# Patient Record
Sex: MALE | Race: WHITE | Employment: FULL TIME | URBAN - METROPOLITAN AREA
[De-identification: names, ages, dates, MRNs, and addresses within clinical notes are randomized per-mention and may not be internally consistent; named-entity substitution may affect disease eponyms.]

---

## 2019-10-23 PROBLEM — T84.59XA INFECTED PROSTHETIC KNEE JOINT, INITIAL ENCOUNTER (HCC): Status: ACTIVE | Noted: 2019-08-11

## 2019-10-23 PROBLEM — Z96.659 INFECTED PROSTHETIC KNEE JOINT, INITIAL ENCOUNTER (HCC): Status: ACTIVE | Noted: 2019-08-11

## 2019-11-27 PROBLEM — N13.8 BPH WITH OBSTRUCTION/LOWER URINARY TRACT SYMPTOMS: Status: ACTIVE | Noted: 2019-11-27

## 2019-11-27 PROBLEM — N40.1 BPH WITH OBSTRUCTION/LOWER URINARY TRACT SYMPTOMS: Status: ACTIVE | Noted: 2019-11-27

## 2019-11-27 PROBLEM — T84.59XD INFECTED PROSTHETIC KNEE JOINT, SUBSEQUENT ENCOUNTER: Status: ACTIVE | Noted: 2019-11-27

## 2019-11-27 PROBLEM — Z96.659 INFECTED PROSTHETIC KNEE JOINT, SUBSEQUENT ENCOUNTER: Status: ACTIVE | Noted: 2019-11-27

## 2020-04-07 ENCOUNTER — CLINICAL DOCUMENTATION (OUTPATIENT)
Dept: INFECTIOUS DISEASES | Age: 75
End: 2020-04-07

## 2020-06-12 ENCOUNTER — HOSPITAL ENCOUNTER (OUTPATIENT)
Age: 75
Discharge: HOME | End: 2020-06-12
Payer: MEDICARE

## 2020-06-12 LAB
ALBUMIN SERPL-MCNC: 4.2 G/DL (ref 3.5–5.2)
ALP BLD-CCNC: 51 U/L (ref 40–129)
ALT SERPL-CCNC: 11 U/L (ref 0–40)
ANION GAP SERPL CALCULATED.3IONS-SCNC: 12 MMOL/L (ref 7–16)
AST SERPL-CCNC: 18 U/L (ref 0–39)
BACTERIA: ABNORMAL /HPF
BASOPHILS ABSOLUTE: 0.02 E9/L (ref 0–0.2)
BASOPHILS RELATIVE PERCENT: 0.4 % (ref 0–2)
BILIRUB SERPL-MCNC: 0.5 MG/DL (ref 0–1.2)
BILIRUBIN URINE: NEGATIVE
BLOOD, URINE: ABNORMAL
BUN BLDV-MCNC: 37 MG/DL (ref 8–23)
CALCIUM SERPL-MCNC: 9.5 MG/DL (ref 8.6–10.2)
CHLORIDE BLD-SCNC: 110 MMOL/L (ref 98–107)
CLARITY: ABNORMAL
CO2: 20 MMOL/L (ref 22–29)
COLOR: YELLOW
CREAT SERPL-MCNC: 1.5 MG/DL (ref 0.7–1.2)
EOSINOPHILS ABSOLUTE: 0.12 E9/L (ref 0.05–0.5)
EOSINOPHILS RELATIVE PERCENT: 2.4 % (ref 0–6)
EPITHELIAL CELLS, UA: ABNORMAL /HPF
GFR AFRICAN AMERICAN: 55
GFR NON-AFRICAN AMERICAN: 46 ML/MIN/1.73
GLUCOSE BLD-MCNC: 97 MG/DL (ref 74–99)
GLUCOSE URINE: NEGATIVE MG/DL
HCT VFR BLD CALC: 37.6 % (ref 37–54)
HEMOGLOBIN: 11.8 G/DL (ref 12.5–16.5)
IMMATURE GRANULOCYTES #: 0.02 E9/L
IMMATURE GRANULOCYTES %: 0.4 % (ref 0–5)
KETONES, URINE: NEGATIVE MG/DL
LEUKOCYTE ESTERASE, URINE: ABNORMAL
LYMPHOCYTES ABSOLUTE: 1.08 E9/L (ref 1.5–4)
LYMPHOCYTES RELATIVE PERCENT: 21.6 % (ref 20–42)
MCH RBC QN AUTO: 26.9 PG (ref 26–35)
MCHC RBC AUTO-ENTMCNC: 31.4 % (ref 32–34.5)
MCV RBC AUTO: 85.6 FL (ref 80–99.9)
MONOCYTES ABSOLUTE: 0.49 E9/L (ref 0.1–0.95)
MONOCYTES RELATIVE PERCENT: 9.8 % (ref 2–12)
NEUTROPHILS ABSOLUTE: 3.26 E9/L (ref 1.8–7.3)
NEUTROPHILS RELATIVE PERCENT: 65.4 % (ref 43–80)
NITRITE, URINE: NEGATIVE
PDW BLD-RTO: 16.5 FL (ref 11.5–15)
PH UA: 5.5 (ref 5–9)
PLATELET # BLD: 214 E9/L (ref 130–450)
PMV BLD AUTO: 11.2 FL (ref 7–12)
POTASSIUM SERPL-SCNC: 4.7 MMOL/L (ref 3.5–5)
PROTEIN UA: NEGATIVE MG/DL
RBC # BLD: 4.39 E12/L (ref 3.8–5.8)
RBC UA: >20 /HPF (ref 0–2)
SODIUM BLD-SCNC: 142 MMOL/L (ref 132–146)
SPECIFIC GRAVITY UA: 1.02 (ref 1–1.03)
TOTAL PROTEIN: 7.4 G/DL (ref 6.4–8.3)
UROBILINOGEN, URINE: 0.2 E.U./DL
WBC # BLD: 5 E9/L (ref 4.5–11.5)
WBC UA: ABNORMAL /HPF (ref 0–5)

## 2020-06-12 PROCEDURE — 87186 SC STD MICRODIL/AGAR DIL: CPT

## 2020-06-12 PROCEDURE — 80053 COMPREHEN METABOLIC PANEL: CPT

## 2020-06-12 PROCEDURE — 87077 CULTURE AEROBIC IDENTIFY: CPT

## 2020-06-12 PROCEDURE — 36415 COLL VENOUS BLD VENIPUNCTURE: CPT

## 2020-06-12 PROCEDURE — 87088 URINE BACTERIA CULTURE: CPT

## 2020-06-12 PROCEDURE — 81001 URINALYSIS AUTO W/SCOPE: CPT

## 2020-06-12 PROCEDURE — 85025 COMPLETE CBC W/AUTO DIFF WBC: CPT

## 2020-06-14 LAB
ORGANISM: ABNORMAL
URINE CULTURE, ROUTINE: ABNORMAL

## 2020-06-15 ENCOUNTER — HOSPITAL ENCOUNTER (OUTPATIENT)
Dept: ULTRASOUND IMAGING | Age: 75
Discharge: HOME | End: 2020-06-15
Payer: MEDICARE

## 2020-06-15 LAB
C-REACTIVE PROTEIN: 0.2 MG/DL (ref 0–0.4)
SEDIMENTATION RATE, ERYTHROCYTE: 13 MM/HR (ref 0–15)

## 2020-06-15 PROCEDURE — 86140 C-REACTIVE PROTEIN: CPT

## 2020-06-15 PROCEDURE — 76770 US EXAM ABDO BACK WALL COMP: CPT

## 2020-06-15 PROCEDURE — 36415 COLL VENOUS BLD VENIPUNCTURE: CPT

## 2020-06-15 PROCEDURE — 85651 RBC SED RATE NONAUTOMATED: CPT

## 2020-07-03 ENCOUNTER — HOSPITAL ENCOUNTER (OUTPATIENT)
Age: 75
Discharge: HOME | End: 2020-07-03
Payer: MEDICARE

## 2020-07-03 ENCOUNTER — HOSPITAL ENCOUNTER (OUTPATIENT)
Dept: GENERAL RADIOLOGY | Age: 75
Discharge: HOME | End: 2020-07-05
Payer: MEDICARE

## 2020-07-03 ENCOUNTER — HOSPITAL ENCOUNTER (OUTPATIENT)
Age: 75
Discharge: HOME | End: 2020-07-05
Payer: MEDICARE

## 2020-07-03 LAB
ALBUMIN SERPL-MCNC: 4 G/DL (ref 3.5–5.2)
ALP BLD-CCNC: 57 U/L (ref 40–129)
ALT SERPL-CCNC: 11 U/L (ref 0–40)
ANION GAP SERPL CALCULATED.3IONS-SCNC: 12 MMOL/L (ref 7–16)
AST SERPL-CCNC: 20 U/L (ref 0–39)
BACTERIA: ABNORMAL /HPF
BASOPHILS ABSOLUTE: 0.02 E9/L (ref 0–0.2)
BASOPHILS RELATIVE PERCENT: 0.4 % (ref 0–2)
BILIRUB SERPL-MCNC: 0.4 MG/DL (ref 0–1.2)
BILIRUBIN URINE: NEGATIVE
BLOOD, URINE: ABNORMAL
BUN BLDV-MCNC: 35 MG/DL (ref 8–23)
CALCIUM SERPL-MCNC: 9.7 MG/DL (ref 8.6–10.2)
CHLORIDE BLD-SCNC: 105 MMOL/L (ref 98–107)
CLARITY: ABNORMAL
CO2: 23 MMOL/L (ref 22–29)
COLOR: YELLOW
CREAT SERPL-MCNC: 1.6 MG/DL (ref 0.7–1.2)
EOSINOPHILS ABSOLUTE: 0.13 E9/L (ref 0.05–0.5)
EOSINOPHILS RELATIVE PERCENT: 2.5 % (ref 0–6)
EPITHELIAL CELLS, UA: ABNORMAL /HPF
GFR AFRICAN AMERICAN: 51
GFR NON-AFRICAN AMERICAN: 43 ML/MIN/1.73
GLUCOSE BLD-MCNC: 97 MG/DL (ref 74–99)
GLUCOSE URINE: NEGATIVE MG/DL
HCT VFR BLD CALC: 39.3 % (ref 37–54)
HEMOGLOBIN: 12.4 G/DL (ref 12.5–16.5)
IMMATURE GRANULOCYTES #: 0.02 E9/L
IMMATURE GRANULOCYTES %: 0.4 % (ref 0–5)
KETONES, URINE: NEGATIVE MG/DL
LEUKOCYTE ESTERASE, URINE: ABNORMAL
LYMPHOCYTES ABSOLUTE: 1.15 E9/L (ref 1.5–4)
LYMPHOCYTES RELATIVE PERCENT: 21.8 % (ref 20–42)
MCH RBC QN AUTO: 27 PG (ref 26–35)
MCHC RBC AUTO-ENTMCNC: 31.6 % (ref 32–34.5)
MCV RBC AUTO: 85.6 FL (ref 80–99.9)
MONOCYTES ABSOLUTE: 0.45 E9/L (ref 0.1–0.95)
MONOCYTES RELATIVE PERCENT: 8.5 % (ref 2–12)
NEUTROPHILS ABSOLUTE: 3.51 E9/L (ref 1.8–7.3)
NEUTROPHILS RELATIVE PERCENT: 66.4 % (ref 43–80)
NITRITE, URINE: NEGATIVE
PDW BLD-RTO: 16.3 FL (ref 11.5–15)
PH UA: 5.5 (ref 5–9)
PLATELET # BLD: 242 E9/L (ref 130–450)
PMV BLD AUTO: 11.7 FL (ref 7–12)
POTASSIUM SERPL-SCNC: 5.1 MMOL/L (ref 3.5–5)
PROTEIN UA: NEGATIVE MG/DL
RBC # BLD: 4.59 E12/L (ref 3.8–5.8)
RBC UA: ABNORMAL /HPF (ref 0–2)
SEDIMENTATION RATE, ERYTHROCYTE: 18 MM/HR (ref 0–15)
SODIUM BLD-SCNC: 140 MMOL/L (ref 132–146)
SPECIFIC GRAVITY UA: 1.02 (ref 1–1.03)
TOTAL PROTEIN: 7.4 G/DL (ref 6.4–8.3)
UROBILINOGEN, URINE: 0.2 E.U./DL
WBC # BLD: 5.3 E9/L (ref 4.5–11.5)
WBC UA: ABNORMAL /HPF (ref 0–5)

## 2020-07-03 PROCEDURE — 80053 COMPREHEN METABOLIC PANEL: CPT

## 2020-07-03 PROCEDURE — 73562 X-RAY EXAM OF KNEE 3: CPT

## 2020-07-03 PROCEDURE — 81001 URINALYSIS AUTO W/SCOPE: CPT

## 2020-07-03 PROCEDURE — 87186 SC STD MICRODIL/AGAR DIL: CPT

## 2020-07-03 PROCEDURE — 87088 URINE BACTERIA CULTURE: CPT

## 2020-07-03 PROCEDURE — 36415 COLL VENOUS BLD VENIPUNCTURE: CPT

## 2020-07-03 PROCEDURE — 85651 RBC SED RATE NONAUTOMATED: CPT

## 2020-07-03 PROCEDURE — 85025 COMPLETE CBC W/AUTO DIFF WBC: CPT

## 2020-07-03 PROCEDURE — 87077 CULTURE AEROBIC IDENTIFY: CPT

## 2020-07-05 LAB
ORGANISM: ABNORMAL
URINE CULTURE, ROUTINE: ABNORMAL

## 2020-07-15 ENCOUNTER — HOSPITAL ENCOUNTER (OUTPATIENT)
Age: 75
Discharge: HOME | End: 2020-07-15
Payer: MEDICARE

## 2020-07-15 LAB
BACTERIA: ABNORMAL /HPF
BILIRUBIN URINE: NEGATIVE
BLOOD, URINE: ABNORMAL
CLARITY: CLEAR
COLOR: YELLOW
GLUCOSE URINE: NEGATIVE MG/DL
KETONES, URINE: NEGATIVE MG/DL
LEUKOCYTE ESTERASE, URINE: ABNORMAL
NITRITE, URINE: NEGATIVE
PH UA: 5 (ref 5–9)
PROTEIN UA: NEGATIVE MG/DL
RBC UA: ABNORMAL /HPF (ref 0–2)
SPECIFIC GRAVITY UA: 1.02 (ref 1–1.03)
UROBILINOGEN, URINE: 0.2 E.U./DL
WBC UA: ABNORMAL /HPF (ref 0–5)

## 2020-07-15 PROCEDURE — 87186 SC STD MICRODIL/AGAR DIL: CPT

## 2020-07-15 PROCEDURE — 87077 CULTURE AEROBIC IDENTIFY: CPT

## 2020-07-15 PROCEDURE — 81001 URINALYSIS AUTO W/SCOPE: CPT

## 2020-07-15 PROCEDURE — 87088 URINE BACTERIA CULTURE: CPT

## 2020-07-18 LAB
ORGANISM: ABNORMAL
URINE CULTURE, ROUTINE: ABNORMAL

## 2020-07-20 PROBLEM — N39.0 URINARY TRACT INFECTION, SITE NOT SPECIFIED: Status: ACTIVE | Noted: 2020-07-20

## 2020-07-20 PROBLEM — N39.0 RECURRENT UTI: Status: ACTIVE | Noted: 2020-07-20

## 2020-07-20 RX ORDER — SODIUM CHLORIDE 0.9 % (FLUSH) 0.9 %
10 SYRINGE (ML) INJECTION PRN
Status: CANCELLED | OUTPATIENT
Start: 2020-07-20

## 2020-07-20 RX ORDER — HEPARIN SODIUM (PORCINE) LOCK FLUSH IV SOLN 100 UNIT/ML 100 UNIT/ML
500 SOLUTION INTRAVENOUS PRN
Status: CANCELLED | OUTPATIENT
Start: 2020-07-20

## 2020-07-21 ENCOUNTER — HOSPITAL ENCOUNTER (OUTPATIENT)
Dept: INFUSION THERAPY | Age: 75
Setting detail: INFUSION SERIES
Discharge: HOME | End: 2020-07-21
Payer: MEDICARE

## 2020-07-21 VITALS
RESPIRATION RATE: 16 BRPM | TEMPERATURE: 97.5 F | OXYGEN SATURATION: 98 % | HEART RATE: 75 BPM | SYSTOLIC BLOOD PRESSURE: 131 MMHG | DIASTOLIC BLOOD PRESSURE: 59 MMHG

## 2020-07-21 DIAGNOSIS — N39.0 RECURRENT UTI: Primary | ICD-10-CM

## 2020-07-21 LAB
ALBUMIN SERPL-MCNC: 4 G/DL (ref 3.5–5.2)
ALP BLD-CCNC: 57 U/L (ref 40–129)
ALT SERPL-CCNC: 9 U/L (ref 0–40)
ANION GAP SERPL CALCULATED.3IONS-SCNC: 15 MMOL/L (ref 7–16)
AST SERPL-CCNC: 16 U/L (ref 0–39)
BASOPHILS ABSOLUTE: 0.02 E9/L (ref 0–0.2)
BASOPHILS RELATIVE PERCENT: 0.4 % (ref 0–2)
BILIRUB SERPL-MCNC: 0.6 MG/DL (ref 0–1.2)
BUN BLDV-MCNC: 52 MG/DL (ref 8–23)
C-REACTIVE PROTEIN: 0.6 MG/DL (ref 0–0.4)
CALCIUM SERPL-MCNC: 9.2 MG/DL (ref 8.6–10.2)
CHLORIDE BLD-SCNC: 103 MMOL/L (ref 98–107)
CO2: 20 MMOL/L (ref 22–29)
CREAT SERPL-MCNC: 1.8 MG/DL (ref 0.7–1.2)
EOSINOPHILS ABSOLUTE: 0.21 E9/L (ref 0.05–0.5)
EOSINOPHILS RELATIVE PERCENT: 4.2 % (ref 0–6)
GFR AFRICAN AMERICAN: 45
GFR NON-AFRICAN AMERICAN: 37 ML/MIN/1.73
GLUCOSE BLD-MCNC: 108 MG/DL (ref 74–99)
HCT VFR BLD CALC: 36.5 % (ref 37–54)
HEMOGLOBIN: 11.7 G/DL (ref 12.5–16.5)
IMMATURE GRANULOCYTES #: 0.01 E9/L
IMMATURE GRANULOCYTES %: 0.2 % (ref 0–5)
INR BLD: 1
LYMPHOCYTES ABSOLUTE: 1.25 E9/L (ref 1.5–4)
LYMPHOCYTES RELATIVE PERCENT: 24.8 % (ref 20–42)
MCH RBC QN AUTO: 27 PG (ref 26–35)
MCHC RBC AUTO-ENTMCNC: 32.1 % (ref 32–34.5)
MCV RBC AUTO: 84.1 FL (ref 80–99.9)
MONOCYTES ABSOLUTE: 0.42 E9/L (ref 0.1–0.95)
MONOCYTES RELATIVE PERCENT: 8.3 % (ref 2–12)
NEUTROPHILS ABSOLUTE: 3.13 E9/L (ref 1.8–7.3)
NEUTROPHILS RELATIVE PERCENT: 62.1 % (ref 43–80)
PDW BLD-RTO: 15.5 FL (ref 11.5–15)
PLATELET # BLD: 238 E9/L (ref 130–450)
PMV BLD AUTO: 11.6 FL (ref 7–12)
POTASSIUM SERPL-SCNC: 4.2 MMOL/L (ref 3.5–5)
PROTHROMBIN TIME: 12 SEC (ref 9.3–12.4)
RBC # BLD: 4.34 E12/L (ref 3.8–5.8)
SEDIMENTATION RATE, ERYTHROCYTE: 17 MM/HR (ref 0–15)
SODIUM BLD-SCNC: 138 MMOL/L (ref 132–146)
TOTAL PROTEIN: 7.1 G/DL (ref 6.4–8.3)
WBC # BLD: 5 E9/L (ref 4.5–11.5)

## 2020-07-21 PROCEDURE — 85651 RBC SED RATE NONAUTOMATED: CPT

## 2020-07-21 PROCEDURE — 36415 COLL VENOUS BLD VENIPUNCTURE: CPT

## 2020-07-21 PROCEDURE — 2580000003 HC RX 258: Performed by: SPECIALIST

## 2020-07-21 PROCEDURE — 96365 THER/PROPH/DIAG IV INF INIT: CPT

## 2020-07-21 PROCEDURE — 6360000002 HC RX W HCPCS: Performed by: SPECIALIST

## 2020-07-21 PROCEDURE — 85025 COMPLETE CBC W/AUTO DIFF WBC: CPT

## 2020-07-21 PROCEDURE — 86140 C-REACTIVE PROTEIN: CPT

## 2020-07-21 PROCEDURE — 85610 PROTHROMBIN TIME: CPT

## 2020-07-21 PROCEDURE — 80053 COMPREHEN METABOLIC PANEL: CPT

## 2020-07-21 RX ORDER — CEFEPIME 2 G/1
2 INJECTION, POWDER, FOR SOLUTION INTRAVENOUS EVERY 24 HOURS
COMMUNITY
End: 2020-08-12 | Stop reason: ALTCHOICE

## 2020-07-21 RX ORDER — SODIUM CHLORIDE 0.9 % (FLUSH) 0.9 %
10 SYRINGE (ML) INJECTION PRN
Status: CANCELLED | OUTPATIENT
Start: 2020-07-22

## 2020-07-21 RX ORDER — HEPARIN SODIUM (PORCINE) LOCK FLUSH IV SOLN 100 UNIT/ML 100 UNIT/ML
500 SOLUTION INTRAVENOUS PRN
Status: CANCELLED | OUTPATIENT
Start: 2020-07-22

## 2020-07-21 RX ORDER — SODIUM CHLORIDE 0.9 % (FLUSH) 0.9 %
10 SYRINGE (ML) INJECTION PRN
Status: DISCONTINUED | OUTPATIENT
Start: 2020-07-21 | End: 2020-07-22 | Stop reason: HOSPADM

## 2020-07-21 RX ORDER — HEPARIN SODIUM (PORCINE) LOCK FLUSH IV SOLN 100 UNIT/ML 100 UNIT/ML
500 SOLUTION INTRAVENOUS PRN
Status: DISCONTINUED | OUTPATIENT
Start: 2020-07-21 | End: 2020-07-22 | Stop reason: HOSPADM

## 2020-07-21 RX ADMIN — Medication 10 ML: at 12:43

## 2020-07-21 RX ADMIN — CEFEPIME HYDROCHLORIDE 2 G: 2 INJECTION, POWDER, FOR SOLUTION INTRAVENOUS at 12:13

## 2020-07-21 RX ADMIN — Medication 10 ML: at 12:13

## 2020-07-22 ENCOUNTER — HOSPITAL ENCOUNTER (OUTPATIENT)
Dept: INFUSION THERAPY | Age: 75
Setting detail: INFUSION SERIES
Discharge: HOME | End: 2020-07-22
Payer: MEDICARE

## 2020-07-22 DIAGNOSIS — N39.0 RECURRENT UTI: Primary | ICD-10-CM

## 2020-07-22 PROCEDURE — 2580000003 HC RX 258: Performed by: SPECIALIST

## 2020-07-22 PROCEDURE — 96365 THER/PROPH/DIAG IV INF INIT: CPT

## 2020-07-22 PROCEDURE — 6360000002 HC RX W HCPCS: Performed by: SPECIALIST

## 2020-07-22 RX ORDER — SODIUM CHLORIDE 0.9 % (FLUSH) 0.9 %
10 SYRINGE (ML) INJECTION PRN
Status: DISCONTINUED | OUTPATIENT
Start: 2020-07-22 | End: 2020-07-23 | Stop reason: HOSPADM

## 2020-07-22 RX ORDER — SODIUM CHLORIDE 0.9 % (FLUSH) 0.9 %
10 SYRINGE (ML) INJECTION PRN
Status: CANCELLED | OUTPATIENT
Start: 2020-07-23

## 2020-07-22 RX ORDER — HEPARIN SODIUM (PORCINE) LOCK FLUSH IV SOLN 100 UNIT/ML 100 UNIT/ML
500 SOLUTION INTRAVENOUS PRN
Status: DISCONTINUED | OUTPATIENT
Start: 2020-07-22 | End: 2020-07-23 | Stop reason: HOSPADM

## 2020-07-22 RX ORDER — HEPARIN SODIUM (PORCINE) LOCK FLUSH IV SOLN 100 UNIT/ML 100 UNIT/ML
500 SOLUTION INTRAVENOUS PRN
Status: CANCELLED | OUTPATIENT
Start: 2020-07-23

## 2020-07-22 RX ADMIN — Medication 10 ML: at 08:51

## 2020-07-22 RX ADMIN — Medication 10 ML: at 08:19

## 2020-07-22 RX ADMIN — CEFEPIME HYDROCHLORIDE 2 G: 2 INJECTION, POWDER, FOR SOLUTION INTRAVENOUS at 08:19

## 2020-07-23 ENCOUNTER — HOSPITAL ENCOUNTER (OUTPATIENT)
Dept: INFUSION THERAPY | Age: 75
Setting detail: INFUSION SERIES
Discharge: HOME | End: 2020-07-23
Payer: MEDICARE

## 2020-07-23 DIAGNOSIS — N39.0 RECURRENT UTI: Primary | ICD-10-CM

## 2020-07-23 LAB
ALBUMIN SERPL-MCNC: 4.1 G/DL (ref 3.5–5.2)
ALP BLD-CCNC: 56 U/L (ref 40–129)
ALT SERPL-CCNC: 8 U/L (ref 0–40)
ANION GAP SERPL CALCULATED.3IONS-SCNC: 12 MMOL/L (ref 7–16)
AST SERPL-CCNC: 15 U/L (ref 0–39)
BASOPHILS ABSOLUTE: 0.03 E9/L (ref 0–0.2)
BASOPHILS RELATIVE PERCENT: 0.6 % (ref 0–2)
BILIRUB SERPL-MCNC: 0.4 MG/DL (ref 0–1.2)
BUN BLDV-MCNC: 35 MG/DL (ref 8–23)
CALCIUM SERPL-MCNC: 9.4 MG/DL (ref 8.6–10.2)
CHLORIDE BLD-SCNC: 108 MMOL/L (ref 98–107)
CO2: 21 MMOL/L (ref 22–29)
CREAT SERPL-MCNC: 1.3 MG/DL (ref 0.7–1.2)
EOSINOPHILS ABSOLUTE: 0.19 E9/L (ref 0.05–0.5)
EOSINOPHILS RELATIVE PERCENT: 3.5 % (ref 0–6)
GFR AFRICAN AMERICAN: >60
GFR NON-AFRICAN AMERICAN: 54 ML/MIN/1.73
GLUCOSE BLD-MCNC: 103 MG/DL (ref 74–99)
HCT VFR BLD CALC: 38.4 % (ref 37–54)
HEMOGLOBIN: 12.3 G/DL (ref 12.5–16.5)
IMMATURE GRANULOCYTES #: 0.01 E9/L
IMMATURE GRANULOCYTES %: 0.2 % (ref 0–5)
LYMPHOCYTES ABSOLUTE: 1.18 E9/L (ref 1.5–4)
LYMPHOCYTES RELATIVE PERCENT: 21.8 % (ref 20–42)
MCH RBC QN AUTO: 27 PG (ref 26–35)
MCHC RBC AUTO-ENTMCNC: 32 % (ref 32–34.5)
MCV RBC AUTO: 84.4 FL (ref 80–99.9)
MONOCYTES ABSOLUTE: 0.48 E9/L (ref 0.1–0.95)
MONOCYTES RELATIVE PERCENT: 8.9 % (ref 2–12)
NEUTROPHILS ABSOLUTE: 3.52 E9/L (ref 1.8–7.3)
NEUTROPHILS RELATIVE PERCENT: 65 % (ref 43–80)
PDW BLD-RTO: 15.7 FL (ref 11.5–15)
PLATELET # BLD: 234 E9/L (ref 130–450)
PMV BLD AUTO: 12.4 FL (ref 7–12)
POTASSIUM SERPL-SCNC: 4.3 MMOL/L (ref 3.5–5)
RBC # BLD: 4.55 E12/L (ref 3.8–5.8)
SODIUM BLD-SCNC: 141 MMOL/L (ref 132–146)
TOTAL PROTEIN: 7.3 G/DL (ref 6.4–8.3)
WBC # BLD: 5.4 E9/L (ref 4.5–11.5)

## 2020-07-23 PROCEDURE — 96365 THER/PROPH/DIAG IV INF INIT: CPT

## 2020-07-23 PROCEDURE — 80053 COMPREHEN METABOLIC PANEL: CPT

## 2020-07-23 PROCEDURE — 6360000002 HC RX W HCPCS: Performed by: SPECIALIST

## 2020-07-23 PROCEDURE — 2580000003 HC RX 258: Performed by: SPECIALIST

## 2020-07-23 PROCEDURE — 85025 COMPLETE CBC W/AUTO DIFF WBC: CPT

## 2020-07-23 PROCEDURE — 36415 COLL VENOUS BLD VENIPUNCTURE: CPT

## 2020-07-23 RX ORDER — SODIUM CHLORIDE 0.9 % (FLUSH) 0.9 %
10 SYRINGE (ML) INJECTION PRN
Status: CANCELLED | OUTPATIENT
Start: 2020-07-24

## 2020-07-23 RX ORDER — SODIUM CHLORIDE 0.9 % (FLUSH) 0.9 %
10 SYRINGE (ML) INJECTION PRN
Status: DISCONTINUED | OUTPATIENT
Start: 2020-07-23 | End: 2020-07-24 | Stop reason: HOSPADM

## 2020-07-23 RX ORDER — HEPARIN SODIUM (PORCINE) LOCK FLUSH IV SOLN 100 UNIT/ML 100 UNIT/ML
500 SOLUTION INTRAVENOUS PRN
Status: CANCELLED | OUTPATIENT
Start: 2020-07-24

## 2020-07-23 RX ORDER — HEPARIN SODIUM (PORCINE) LOCK FLUSH IV SOLN 100 UNIT/ML 100 UNIT/ML
500 SOLUTION INTRAVENOUS PRN
Status: DISCONTINUED | OUTPATIENT
Start: 2020-07-23 | End: 2020-07-24 | Stop reason: HOSPADM

## 2020-07-23 RX ADMIN — HEPARIN 500 UNITS: 100 SYRINGE at 08:49

## 2020-07-23 RX ADMIN — Medication 10 ML: at 08:20

## 2020-07-23 RX ADMIN — CEFEPIME HYDROCHLORIDE 2 G: 2 INJECTION, POWDER, FOR SOLUTION INTRAVENOUS at 08:19

## 2020-07-23 RX ADMIN — Medication 10 ML: at 08:49

## 2020-07-24 ENCOUNTER — HOSPITAL ENCOUNTER (OUTPATIENT)
Dept: INFUSION THERAPY | Age: 75
Setting detail: INFUSION SERIES
Discharge: HOME | End: 2020-07-24
Payer: MEDICARE

## 2020-07-24 VITALS
OXYGEN SATURATION: 97 % | SYSTOLIC BLOOD PRESSURE: 152 MMHG | TEMPERATURE: 97.9 F | RESPIRATION RATE: 22 BRPM | HEART RATE: 60 BPM | DIASTOLIC BLOOD PRESSURE: 68 MMHG

## 2020-07-24 DIAGNOSIS — N39.0 RECURRENT UTI: Primary | ICD-10-CM

## 2020-07-24 PROCEDURE — 6360000002 HC RX W HCPCS: Performed by: SPECIALIST

## 2020-07-24 PROCEDURE — 2580000003 HC RX 258: Performed by: SPECIALIST

## 2020-07-24 PROCEDURE — 96365 THER/PROPH/DIAG IV INF INIT: CPT

## 2020-07-24 RX ORDER — HEPARIN SODIUM (PORCINE) LOCK FLUSH IV SOLN 100 UNIT/ML 100 UNIT/ML
500 SOLUTION INTRAVENOUS PRN
Status: DISCONTINUED | OUTPATIENT
Start: 2020-07-24 | End: 2020-07-25 | Stop reason: HOSPADM

## 2020-07-24 RX ORDER — HEPARIN SODIUM (PORCINE) LOCK FLUSH IV SOLN 100 UNIT/ML 100 UNIT/ML
500 SOLUTION INTRAVENOUS PRN
Status: CANCELLED | OUTPATIENT
Start: 2020-07-25

## 2020-07-24 RX ORDER — SODIUM CHLORIDE 0.9 % (FLUSH) 0.9 %
10 SYRINGE (ML) INJECTION PRN
Status: CANCELLED | OUTPATIENT
Start: 2020-07-25

## 2020-07-24 RX ORDER — SODIUM CHLORIDE 0.9 % (FLUSH) 0.9 %
10 SYRINGE (ML) INJECTION PRN
Status: DISCONTINUED | OUTPATIENT
Start: 2020-07-24 | End: 2020-07-25 | Stop reason: HOSPADM

## 2020-07-24 RX ADMIN — SODIUM CHLORIDE, PRESERVATIVE FREE 10 ML: 5 INJECTION INTRAVENOUS at 08:40

## 2020-07-24 RX ADMIN — HEPARIN 500 UNITS: 100 SYRINGE at 08:40

## 2020-07-24 RX ADMIN — CEFEPIME HYDROCHLORIDE 2 G: 2 INJECTION, POWDER, FOR SOLUTION INTRAVENOUS at 08:12

## 2020-07-24 RX ADMIN — SODIUM CHLORIDE, PRESERVATIVE FREE 10 ML: 5 INJECTION INTRAVENOUS at 08:13

## 2020-07-25 ENCOUNTER — HOSPITAL ENCOUNTER (OUTPATIENT)
Dept: INFUSION THERAPY | Age: 75
Setting detail: INFUSION SERIES
Discharge: HOME | End: 2020-07-25
Payer: MEDICARE

## 2020-07-25 VITALS
HEART RATE: 68 BPM | SYSTOLIC BLOOD PRESSURE: 140 MMHG | DIASTOLIC BLOOD PRESSURE: 65 MMHG | TEMPERATURE: 98.3 F | RESPIRATION RATE: 14 BRPM | OXYGEN SATURATION: 98 %

## 2020-07-25 DIAGNOSIS — N39.0 RECURRENT UTI: Primary | ICD-10-CM

## 2020-07-25 PROCEDURE — 6360000002 HC RX W HCPCS: Performed by: SPECIALIST

## 2020-07-25 PROCEDURE — 96365 THER/PROPH/DIAG IV INF INIT: CPT

## 2020-07-25 PROCEDURE — 2580000003 HC RX 258: Performed by: SPECIALIST

## 2020-07-25 RX ORDER — SODIUM CHLORIDE 0.9 % (FLUSH) 0.9 %
10 SYRINGE (ML) INJECTION PRN
Status: CANCELLED | OUTPATIENT
Start: 2020-07-26

## 2020-07-25 RX ORDER — SODIUM CHLORIDE 0.9 % (FLUSH) 0.9 %
10 SYRINGE (ML) INJECTION PRN
Status: DISCONTINUED | OUTPATIENT
Start: 2020-07-25 | End: 2020-07-26 | Stop reason: HOSPADM

## 2020-07-25 RX ORDER — HEPARIN SODIUM (PORCINE) LOCK FLUSH IV SOLN 100 UNIT/ML 100 UNIT/ML
500 SOLUTION INTRAVENOUS PRN
Status: CANCELLED | OUTPATIENT
Start: 2020-07-26

## 2020-07-25 RX ORDER — HEPARIN SODIUM (PORCINE) LOCK FLUSH IV SOLN 100 UNIT/ML 100 UNIT/ML
500 SOLUTION INTRAVENOUS PRN
Status: DISCONTINUED | OUTPATIENT
Start: 2020-07-25 | End: 2020-07-26 | Stop reason: HOSPADM

## 2020-07-25 RX ADMIN — CEFEPIME HYDROCHLORIDE 2 G: 2 INJECTION, POWDER, FOR SOLUTION INTRAVENOUS at 08:19

## 2020-07-25 RX ADMIN — Medication 500 UNITS: at 08:20

## 2020-07-25 RX ADMIN — SODIUM CHLORIDE, PRESERVATIVE FREE 10 ML: 5 INJECTION INTRAVENOUS at 08:20

## 2020-07-26 ENCOUNTER — HOSPITAL ENCOUNTER (OUTPATIENT)
Dept: INFUSION THERAPY | Age: 75
Setting detail: INFUSION SERIES
Discharge: HOME | End: 2020-07-26
Payer: MEDICARE

## 2020-07-26 DIAGNOSIS — N39.0 RECURRENT UTI: Primary | ICD-10-CM

## 2020-07-26 PROCEDURE — 96365 THER/PROPH/DIAG IV INF INIT: CPT

## 2020-07-26 PROCEDURE — 6360000002 HC RX W HCPCS: Performed by: SPECIALIST

## 2020-07-26 PROCEDURE — 2580000003 HC RX 258: Performed by: SPECIALIST

## 2020-07-26 RX ORDER — SODIUM CHLORIDE 0.9 % (FLUSH) 0.9 %
10 SYRINGE (ML) INJECTION PRN
Status: CANCELLED | OUTPATIENT
Start: 2020-07-27

## 2020-07-26 RX ORDER — HEPARIN SODIUM (PORCINE) LOCK FLUSH IV SOLN 100 UNIT/ML 100 UNIT/ML
500 SOLUTION INTRAVENOUS PRN
Status: DISCONTINUED | OUTPATIENT
Start: 2020-07-26 | End: 2020-07-27 | Stop reason: HOSPADM

## 2020-07-26 RX ORDER — SODIUM CHLORIDE 0.9 % (FLUSH) 0.9 %
10 SYRINGE (ML) INJECTION PRN
Status: DISCONTINUED | OUTPATIENT
Start: 2020-07-26 | End: 2020-07-27 | Stop reason: HOSPADM

## 2020-07-26 RX ORDER — HEPARIN SODIUM (PORCINE) LOCK FLUSH IV SOLN 100 UNIT/ML 100 UNIT/ML
500 SOLUTION INTRAVENOUS PRN
Status: CANCELLED | OUTPATIENT
Start: 2020-07-27

## 2020-07-26 RX ADMIN — Medication 500 UNITS: at 08:36

## 2020-07-26 RX ADMIN — CEFEPIME 2 G: 2 INJECTION, POWDER, FOR SOLUTION INTRAMUSCULAR; INTRAVENOUS at 08:36

## 2020-07-26 RX ADMIN — SODIUM CHLORIDE, PRESERVATIVE FREE 10 ML: 5 INJECTION INTRAVENOUS at 08:36

## 2020-07-27 ENCOUNTER — HOSPITAL ENCOUNTER (OUTPATIENT)
Dept: INFUSION THERAPY | Age: 75
Setting detail: INFUSION SERIES
Discharge: HOME | End: 2020-07-27
Payer: MEDICARE

## 2020-07-27 ENCOUNTER — HOSPITAL ENCOUNTER (OUTPATIENT)
Dept: INTERVENTIONAL RADIOLOGY/VASCULAR | Age: 75
Discharge: HOME | End: 2020-07-27
Payer: MEDICARE

## 2020-07-27 VITALS
RESPIRATION RATE: 16 BRPM | OXYGEN SATURATION: 98 % | TEMPERATURE: 98.2 F | SYSTOLIC BLOOD PRESSURE: 148 MMHG | HEART RATE: 75 BPM | DIASTOLIC BLOOD PRESSURE: 73 MMHG

## 2020-07-27 DIAGNOSIS — N39.0 RECURRENT UTI: Primary | ICD-10-CM

## 2020-07-27 LAB
ALBUMIN SERPL-MCNC: 4 G/DL (ref 3.5–5.2)
ALP BLD-CCNC: 55 U/L (ref 40–129)
ALT SERPL-CCNC: 7 U/L (ref 0–40)
ANION GAP SERPL CALCULATED.3IONS-SCNC: 13 MMOL/L (ref 7–16)
AST SERPL-CCNC: 16 U/L (ref 0–39)
BASOPHILS ABSOLUTE: 0.03 E9/L (ref 0–0.2)
BASOPHILS RELATIVE PERCENT: 0.5 % (ref 0–2)
BILIRUB SERPL-MCNC: 0.5 MG/DL (ref 0–1.2)
BUN BLDV-MCNC: 38 MG/DL (ref 8–23)
C-REACTIVE PROTEIN: 0.1 MG/DL (ref 0–0.4)
CALCIUM SERPL-MCNC: 9.3 MG/DL (ref 8.6–10.2)
CHLORIDE BLD-SCNC: 105 MMOL/L (ref 98–107)
CO2: 22 MMOL/L (ref 22–29)
CREAT SERPL-MCNC: 1.4 MG/DL (ref 0.7–1.2)
EOSINOPHILS ABSOLUTE: 0.2 E9/L (ref 0.05–0.5)
EOSINOPHILS RELATIVE PERCENT: 3.3 % (ref 0–6)
GFR AFRICAN AMERICAN: >60
GFR NON-AFRICAN AMERICAN: 50 ML/MIN/1.73
GLUCOSE BLD-MCNC: 101 MG/DL (ref 74–99)
HCT VFR BLD CALC: 40.1 % (ref 37–54)
HEMOGLOBIN: 12.5 G/DL (ref 12.5–16.5)
IMMATURE GRANULOCYTES #: 0.02 E9/L
IMMATURE GRANULOCYTES %: 0.3 % (ref 0–5)
LYMPHOCYTES ABSOLUTE: 1.45 E9/L (ref 1.5–4)
LYMPHOCYTES RELATIVE PERCENT: 23.9 % (ref 20–42)
MCH RBC QN AUTO: 26.7 PG (ref 26–35)
MCHC RBC AUTO-ENTMCNC: 31.2 % (ref 32–34.5)
MCV RBC AUTO: 85.5 FL (ref 80–99.9)
MONOCYTES ABSOLUTE: 0.4 E9/L (ref 0.1–0.95)
MONOCYTES RELATIVE PERCENT: 6.6 % (ref 2–12)
NEUTROPHILS ABSOLUTE: 3.97 E9/L (ref 1.8–7.3)
NEUTROPHILS RELATIVE PERCENT: 65.4 % (ref 43–80)
PDW BLD-RTO: 15.8 FL (ref 11.5–15)
PLATELET # BLD: 264 E9/L (ref 130–450)
PMV BLD AUTO: 12.3 FL (ref 7–12)
POTASSIUM SERPL-SCNC: 4.3 MMOL/L (ref 3.5–5)
RBC # BLD: 4.69 E12/L (ref 3.8–5.8)
SEDIMENTATION RATE, ERYTHROCYTE: 12 MM/HR (ref 0–15)
SODIUM BLD-SCNC: 140 MMOL/L (ref 132–146)
TOTAL PROTEIN: 7.6 G/DL (ref 6.4–8.3)
WBC # BLD: 6.1 E9/L (ref 4.5–11.5)

## 2020-07-27 PROCEDURE — 85651 RBC SED RATE NONAUTOMATED: CPT

## 2020-07-27 PROCEDURE — 2500000003 HC RX 250 WO HCPCS: Performed by: RADIOLOGY

## 2020-07-27 PROCEDURE — 80053 COMPREHEN METABOLIC PANEL: CPT

## 2020-07-27 PROCEDURE — 96365 THER/PROPH/DIAG IV INF INIT: CPT

## 2020-07-27 PROCEDURE — 86140 C-REACTIVE PROTEIN: CPT

## 2020-07-27 PROCEDURE — 2580000003 HC RX 258: Performed by: SPECIALIST

## 2020-07-27 PROCEDURE — 36573 INSJ PICC RS&I 5 YR+: CPT | Performed by: RADIOLOGY

## 2020-07-27 PROCEDURE — C1751 CATH, INF, PER/CENT/MIDLINE: HCPCS

## 2020-07-27 PROCEDURE — 36592 COLLECT BLOOD FROM PICC: CPT

## 2020-07-27 PROCEDURE — 36415 COLL VENOUS BLD VENIPUNCTURE: CPT

## 2020-07-27 PROCEDURE — 85025 COMPLETE CBC W/AUTO DIFF WBC: CPT

## 2020-07-27 PROCEDURE — 6360000002 HC RX W HCPCS: Performed by: SPECIALIST

## 2020-07-27 RX ORDER — HEPARIN SODIUM (PORCINE) LOCK FLUSH IV SOLN 100 UNIT/ML 100 UNIT/ML
500 SOLUTION INTRAVENOUS PRN
Status: CANCELLED | OUTPATIENT
Start: 2020-07-28

## 2020-07-27 RX ORDER — HEPARIN SODIUM (PORCINE) LOCK FLUSH IV SOLN 100 UNIT/ML 100 UNIT/ML
500 SOLUTION INTRAVENOUS PRN
Status: DISCONTINUED | OUTPATIENT
Start: 2020-07-27 | End: 2020-07-28 | Stop reason: HOSPADM

## 2020-07-27 RX ORDER — SODIUM CHLORIDE 0.9 % (FLUSH) 0.9 %
10 SYRINGE (ML) INJECTION PRN
Status: DISCONTINUED | OUTPATIENT
Start: 2020-07-27 | End: 2020-07-28 | Stop reason: HOSPADM

## 2020-07-27 RX ORDER — SODIUM CHLORIDE 0.9 % (FLUSH) 0.9 %
10 SYRINGE (ML) INJECTION PRN
Status: CANCELLED | OUTPATIENT
Start: 2020-07-28

## 2020-07-27 RX ORDER — LIDOCAINE HYDROCHLORIDE 10 MG/ML
10 INJECTION, SOLUTION INFILTRATION; PERINEURAL ONCE
Status: COMPLETED | OUTPATIENT
Start: 2020-07-27 | End: 2020-07-27

## 2020-07-27 RX ADMIN — SODIUM CHLORIDE, PRESERVATIVE FREE 10 ML: 5 INJECTION INTRAVENOUS at 09:13

## 2020-07-27 RX ADMIN — SODIUM CHLORIDE, PRESERVATIVE FREE 10 ML: 5 INJECTION INTRAVENOUS at 09:48

## 2020-07-27 RX ADMIN — SODIUM CHLORIDE, PRESERVATIVE FREE 10 ML: 5 INJECTION INTRAVENOUS at 09:19

## 2020-07-27 RX ADMIN — HEPARIN 500 UNITS: 100 SYRINGE at 09:48

## 2020-07-27 RX ADMIN — LIDOCAINE HYDROCHLORIDE 10 ML: 10 INJECTION, SOLUTION INFILTRATION; PERINEURAL at 09:03

## 2020-07-27 RX ADMIN — CEFEPIME HYDROCHLORIDE 2 G: 2 INJECTION, POWDER, FOR SOLUTION INTRAVENOUS at 09:19

## 2020-07-27 ASSESSMENT — PAIN SCALES - GENERAL: PAINLEVEL_OUTOF10: 5

## 2020-07-28 ENCOUNTER — HOSPITAL ENCOUNTER (OUTPATIENT)
Dept: INFUSION THERAPY | Age: 75
Setting detail: INFUSION SERIES
Discharge: HOME | End: 2020-07-28
Payer: MEDICARE

## 2020-07-28 VITALS
DIASTOLIC BLOOD PRESSURE: 72 MMHG | SYSTOLIC BLOOD PRESSURE: 162 MMHG | OXYGEN SATURATION: 98 % | TEMPERATURE: 98.2 F | RESPIRATION RATE: 16 BRPM | HEART RATE: 72 BPM

## 2020-07-28 DIAGNOSIS — N39.0 RECURRENT UTI: Primary | ICD-10-CM

## 2020-07-28 PROCEDURE — 99211 OFF/OP EST MAY X REQ PHY/QHP: CPT

## 2020-07-28 PROCEDURE — 6360000002 HC RX W HCPCS: Performed by: SPECIALIST

## 2020-07-28 PROCEDURE — 2580000003 HC RX 258: Performed by: SPECIALIST

## 2020-07-28 PROCEDURE — 96365 THER/PROPH/DIAG IV INF INIT: CPT

## 2020-07-28 RX ORDER — SODIUM CHLORIDE 0.9 % (FLUSH) 0.9 %
10 SYRINGE (ML) INJECTION PRN
Status: CANCELLED | OUTPATIENT
Start: 2020-07-29

## 2020-07-28 RX ORDER — HEPARIN SODIUM (PORCINE) LOCK FLUSH IV SOLN 100 UNIT/ML 100 UNIT/ML
500 SOLUTION INTRAVENOUS PRN
Status: DISCONTINUED | OUTPATIENT
Start: 2020-07-28 | End: 2020-07-29 | Stop reason: HOSPADM

## 2020-07-28 RX ORDER — HEPARIN SODIUM (PORCINE) LOCK FLUSH IV SOLN 100 UNIT/ML 100 UNIT/ML
500 SOLUTION INTRAVENOUS PRN
Status: CANCELLED | OUTPATIENT
Start: 2020-07-29

## 2020-07-28 RX ORDER — SODIUM CHLORIDE 0.9 % (FLUSH) 0.9 %
10 SYRINGE (ML) INJECTION PRN
Status: DISCONTINUED | OUTPATIENT
Start: 2020-07-28 | End: 2020-07-29 | Stop reason: HOSPADM

## 2020-07-28 RX ADMIN — Medication 500 UNITS: at 08:34

## 2020-07-28 RX ADMIN — CEFEPIME HYDROCHLORIDE 2 G: 2 INJECTION, POWDER, FOR SOLUTION INTRAVENOUS at 08:02

## 2020-07-28 RX ADMIN — Medication 10 ML: at 08:02

## 2020-07-28 RX ADMIN — Medication 10 ML: at 08:34

## 2020-07-29 ENCOUNTER — HOSPITAL ENCOUNTER (OUTPATIENT)
Dept: INFUSION THERAPY | Age: 75
Setting detail: INFUSION SERIES
Discharge: HOME | End: 2020-07-29
Payer: MEDICARE

## 2020-07-29 VITALS
RESPIRATION RATE: 16 BRPM | DIASTOLIC BLOOD PRESSURE: 66 MMHG | SYSTOLIC BLOOD PRESSURE: 142 MMHG | TEMPERATURE: 98.4 F | HEART RATE: 62 BPM | OXYGEN SATURATION: 99 %

## 2020-07-29 DIAGNOSIS — N39.0 RECURRENT UTI: Primary | ICD-10-CM

## 2020-07-29 PROCEDURE — 2580000003 HC RX 258: Performed by: SPECIALIST

## 2020-07-29 PROCEDURE — 96365 THER/PROPH/DIAG IV INF INIT: CPT

## 2020-07-29 PROCEDURE — 6360000002 HC RX W HCPCS: Performed by: SPECIALIST

## 2020-07-29 RX ORDER — HEPARIN SODIUM (PORCINE) LOCK FLUSH IV SOLN 100 UNIT/ML 100 UNIT/ML
500 SOLUTION INTRAVENOUS PRN
Status: DISCONTINUED | OUTPATIENT
Start: 2020-07-29 | End: 2020-07-30 | Stop reason: HOSPADM

## 2020-07-29 RX ORDER — SODIUM CHLORIDE 0.9 % (FLUSH) 0.9 %
10 SYRINGE (ML) INJECTION PRN
Status: DISCONTINUED | OUTPATIENT
Start: 2020-07-29 | End: 2020-07-30 | Stop reason: HOSPADM

## 2020-07-29 RX ORDER — SODIUM CHLORIDE 0.9 % (FLUSH) 0.9 %
10 SYRINGE (ML) INJECTION PRN
Status: CANCELLED | OUTPATIENT
Start: 2020-07-30

## 2020-07-29 RX ORDER — HEPARIN SODIUM (PORCINE) LOCK FLUSH IV SOLN 100 UNIT/ML 100 UNIT/ML
500 SOLUTION INTRAVENOUS PRN
Status: CANCELLED | OUTPATIENT
Start: 2020-07-30

## 2020-07-29 RX ADMIN — Medication 500 UNITS: at 08:46

## 2020-07-29 RX ADMIN — Medication 500 UNITS: at 08:12

## 2020-07-29 RX ADMIN — SODIUM CHLORIDE, PRESERVATIVE FREE 10 ML: 5 INJECTION INTRAVENOUS at 08:17

## 2020-07-29 RX ADMIN — SODIUM CHLORIDE, PRESERVATIVE FREE 10 ML: 5 INJECTION INTRAVENOUS at 08:12

## 2020-07-29 RX ADMIN — CEFEPIME HYDROCHLORIDE 2 G: 2 INJECTION, POWDER, FOR SOLUTION INTRAVENOUS at 08:11

## 2020-07-29 RX ADMIN — SODIUM CHLORIDE, PRESERVATIVE FREE 10 ML: 5 INJECTION INTRAVENOUS at 08:46

## 2020-07-30 ENCOUNTER — HOSPITAL ENCOUNTER (OUTPATIENT)
Dept: INFUSION THERAPY | Age: 75
Setting detail: INFUSION SERIES
Discharge: HOME | End: 2020-07-30
Payer: MEDICARE

## 2020-07-30 DIAGNOSIS — N39.0 RECURRENT UTI: Primary | ICD-10-CM

## 2020-07-30 LAB
ALBUMIN SERPL-MCNC: 4.1 G/DL (ref 3.5–5.2)
ALP BLD-CCNC: 54 U/L (ref 40–129)
ALT SERPL-CCNC: 12 U/L (ref 0–40)
ANION GAP SERPL CALCULATED.3IONS-SCNC: 11 MMOL/L (ref 7–16)
AST SERPL-CCNC: 17 U/L (ref 0–39)
BASOPHILS ABSOLUTE: 0.02 E9/L (ref 0–0.2)
BASOPHILS RELATIVE PERCENT: 0.4 % (ref 0–2)
BILIRUB SERPL-MCNC: 0.4 MG/DL (ref 0–1.2)
BUN BLDV-MCNC: 37 MG/DL (ref 8–23)
CALCIUM SERPL-MCNC: 9.7 MG/DL (ref 8.6–10.2)
CHLORIDE BLD-SCNC: 104 MMOL/L (ref 98–107)
CO2: 24 MMOL/L (ref 22–29)
CREAT SERPL-MCNC: 1.5 MG/DL (ref 0.7–1.2)
EOSINOPHILS ABSOLUTE: 0.2 E9/L (ref 0.05–0.5)
EOSINOPHILS RELATIVE PERCENT: 3.7 % (ref 0–6)
GFR AFRICAN AMERICAN: 55
GFR NON-AFRICAN AMERICAN: 46 ML/MIN/1.73
GLUCOSE BLD-MCNC: 131 MG/DL (ref 74–99)
HCT VFR BLD CALC: 39.7 % (ref 37–54)
HEMOGLOBIN: 12 G/DL (ref 12.5–16.5)
IMMATURE GRANULOCYTES #: 0.02 E9/L
IMMATURE GRANULOCYTES %: 0.4 % (ref 0–5)
LYMPHOCYTES ABSOLUTE: 1.22 E9/L (ref 1.5–4)
LYMPHOCYTES RELATIVE PERCENT: 22.6 % (ref 20–42)
MCH RBC QN AUTO: 25.9 PG (ref 26–35)
MCHC RBC AUTO-ENTMCNC: 30.2 % (ref 32–34.5)
MCV RBC AUTO: 85.7 FL (ref 80–99.9)
MONOCYTES ABSOLUTE: 0.34 E9/L (ref 0.1–0.95)
MONOCYTES RELATIVE PERCENT: 6.3 % (ref 2–12)
NEUTROPHILS ABSOLUTE: 3.6 E9/L (ref 1.8–7.3)
NEUTROPHILS RELATIVE PERCENT: 66.6 % (ref 43–80)
PDW BLD-RTO: 15.7 FL (ref 11.5–15)
PLATELET # BLD: 215 E9/L (ref 130–450)
PMV BLD AUTO: 11.4 FL (ref 7–12)
POTASSIUM SERPL-SCNC: 4.2 MMOL/L (ref 3.5–5)
RBC # BLD: 4.63 E12/L (ref 3.8–5.8)
SODIUM BLD-SCNC: 139 MMOL/L (ref 132–146)
TOTAL PROTEIN: 7.4 G/DL (ref 6.4–8.3)
WBC # BLD: 5.4 E9/L (ref 4.5–11.5)

## 2020-07-30 PROCEDURE — 36592 COLLECT BLOOD FROM PICC: CPT

## 2020-07-30 PROCEDURE — 80053 COMPREHEN METABOLIC PANEL: CPT

## 2020-07-30 PROCEDURE — 96365 THER/PROPH/DIAG IV INF INIT: CPT

## 2020-07-30 PROCEDURE — 6360000002 HC RX W HCPCS: Performed by: SPECIALIST

## 2020-07-30 PROCEDURE — 2580000003 HC RX 258: Performed by: SPECIALIST

## 2020-07-30 PROCEDURE — 36415 COLL VENOUS BLD VENIPUNCTURE: CPT

## 2020-07-30 PROCEDURE — 85025 COMPLETE CBC W/AUTO DIFF WBC: CPT

## 2020-07-30 RX ORDER — HEPARIN SODIUM (PORCINE) LOCK FLUSH IV SOLN 100 UNIT/ML 100 UNIT/ML
500 SOLUTION INTRAVENOUS PRN
Status: CANCELLED | OUTPATIENT
Start: 2020-07-31

## 2020-07-30 RX ORDER — HEPARIN SODIUM (PORCINE) LOCK FLUSH IV SOLN 100 UNIT/ML 100 UNIT/ML
500 SOLUTION INTRAVENOUS PRN
Status: DISCONTINUED | OUTPATIENT
Start: 2020-07-30 | End: 2020-07-31 | Stop reason: HOSPADM

## 2020-07-30 RX ORDER — SODIUM CHLORIDE 0.9 % (FLUSH) 0.9 %
10 SYRINGE (ML) INJECTION PRN
Status: DISCONTINUED | OUTPATIENT
Start: 2020-07-30 | End: 2020-07-31 | Stop reason: HOSPADM

## 2020-07-30 RX ORDER — SODIUM CHLORIDE 0.9 % (FLUSH) 0.9 %
10 SYRINGE (ML) INJECTION PRN
Status: CANCELLED | OUTPATIENT
Start: 2020-07-31

## 2020-07-30 RX ADMIN — SODIUM CHLORIDE, PRESERVATIVE FREE 10 ML: 5 INJECTION INTRAVENOUS at 08:53

## 2020-07-30 RX ADMIN — CEFEPIME HYDROCHLORIDE 2 G: 2 INJECTION, POWDER, FOR SOLUTION INTRAVENOUS at 08:23

## 2020-07-30 RX ADMIN — SODIUM CHLORIDE, PRESERVATIVE FREE 10 ML: 5 INJECTION INTRAVENOUS at 08:23

## 2020-07-30 RX ADMIN — Medication 500 UNITS: at 08:53

## 2020-07-31 ENCOUNTER — HOSPITAL ENCOUNTER (OUTPATIENT)
Dept: INFUSION THERAPY | Age: 75
Setting detail: INFUSION SERIES
Discharge: HOME | End: 2020-07-31
Payer: MEDICARE

## 2020-07-31 VITALS
RESPIRATION RATE: 16 BRPM | SYSTOLIC BLOOD PRESSURE: 158 MMHG | HEART RATE: 68 BPM | TEMPERATURE: 97.2 F | OXYGEN SATURATION: 97 % | DIASTOLIC BLOOD PRESSURE: 72 MMHG

## 2020-07-31 DIAGNOSIS — N39.0 RECURRENT UTI: Primary | ICD-10-CM

## 2020-07-31 PROCEDURE — 96365 THER/PROPH/DIAG IV INF INIT: CPT

## 2020-07-31 PROCEDURE — 2580000003 HC RX 258: Performed by: SPECIALIST

## 2020-07-31 PROCEDURE — 6360000002 HC RX W HCPCS: Performed by: SPECIALIST

## 2020-07-31 RX ORDER — SODIUM CHLORIDE 0.9 % (FLUSH) 0.9 %
10 SYRINGE (ML) INJECTION PRN
Status: CANCELLED | OUTPATIENT
Start: 2020-08-01

## 2020-07-31 RX ORDER — SODIUM CHLORIDE 0.9 % (FLUSH) 0.9 %
10 SYRINGE (ML) INJECTION PRN
Status: DISCONTINUED | OUTPATIENT
Start: 2020-07-31 | End: 2020-08-01 | Stop reason: HOSPADM

## 2020-07-31 RX ORDER — HEPARIN SODIUM (PORCINE) LOCK FLUSH IV SOLN 100 UNIT/ML 100 UNIT/ML
500 SOLUTION INTRAVENOUS PRN
Status: CANCELLED | OUTPATIENT
Start: 2020-08-01

## 2020-07-31 RX ORDER — HEPARIN SODIUM (PORCINE) LOCK FLUSH IV SOLN 100 UNIT/ML 100 UNIT/ML
500 SOLUTION INTRAVENOUS PRN
Status: DISCONTINUED | OUTPATIENT
Start: 2020-07-31 | End: 2020-08-01 | Stop reason: HOSPADM

## 2020-07-31 RX ADMIN — Medication 500 UNITS: at 08:27

## 2020-07-31 RX ADMIN — SODIUM CHLORIDE, PRESERVATIVE FREE 10 ML: 5 INJECTION INTRAVENOUS at 07:59

## 2020-07-31 RX ADMIN — CEFEPIME HYDROCHLORIDE 2 G: 2 INJECTION, POWDER, FOR SOLUTION INTRAVENOUS at 07:59

## 2020-07-31 RX ADMIN — SODIUM CHLORIDE, PRESERVATIVE FREE 10 ML: 5 INJECTION INTRAVENOUS at 08:27

## 2020-08-01 ENCOUNTER — HOSPITAL ENCOUNTER (OUTPATIENT)
Dept: INFUSION THERAPY | Age: 75
Setting detail: INFUSION SERIES
Discharge: HOME | End: 2020-08-01
Payer: MEDICARE

## 2020-08-01 VITALS
DIASTOLIC BLOOD PRESSURE: 62 MMHG | SYSTOLIC BLOOD PRESSURE: 142 MMHG | RESPIRATION RATE: 16 BRPM | HEART RATE: 72 BPM | OXYGEN SATURATION: 97 % | TEMPERATURE: 97.2 F

## 2020-08-01 DIAGNOSIS — N39.0 RECURRENT UTI: Primary | ICD-10-CM

## 2020-08-01 PROCEDURE — 6360000002 HC RX W HCPCS: Performed by: SPECIALIST

## 2020-08-01 PROCEDURE — 96365 THER/PROPH/DIAG IV INF INIT: CPT

## 2020-08-01 PROCEDURE — 2580000003 HC RX 258: Performed by: SPECIALIST

## 2020-08-01 RX ORDER — HEPARIN SODIUM (PORCINE) LOCK FLUSH IV SOLN 100 UNIT/ML 100 UNIT/ML
500 SOLUTION INTRAVENOUS PRN
Status: CANCELLED | OUTPATIENT
Start: 2020-08-02

## 2020-08-01 RX ORDER — HEPARIN SODIUM (PORCINE) LOCK FLUSH IV SOLN 100 UNIT/ML 100 UNIT/ML
500 SOLUTION INTRAVENOUS PRN
Status: DISCONTINUED | OUTPATIENT
Start: 2020-08-01 | End: 2020-08-02 | Stop reason: HOSPADM

## 2020-08-01 RX ORDER — SODIUM CHLORIDE 0.9 % (FLUSH) 0.9 %
10 SYRINGE (ML) INJECTION PRN
Status: CANCELLED | OUTPATIENT
Start: 2020-08-02

## 2020-08-01 RX ORDER — SODIUM CHLORIDE 0.9 % (FLUSH) 0.9 %
10 SYRINGE (ML) INJECTION PRN
Status: DISCONTINUED | OUTPATIENT
Start: 2020-08-01 | End: 2020-08-02 | Stop reason: HOSPADM

## 2020-08-01 RX ADMIN — Medication 10 ML: at 08:10

## 2020-08-01 RX ADMIN — Medication 500 UNITS: at 08:37

## 2020-08-01 RX ADMIN — Medication 10 ML: at 08:37

## 2020-08-01 RX ADMIN — CEFEPIME HYDROCHLORIDE 2 G: 2 INJECTION, POWDER, FOR SOLUTION INTRAVENOUS at 08:11

## 2020-08-02 ENCOUNTER — HOSPITAL ENCOUNTER (OUTPATIENT)
Dept: INFUSION THERAPY | Age: 75
Setting detail: INFUSION SERIES
Discharge: HOME | End: 2020-08-02
Payer: MEDICARE

## 2020-08-02 VITALS
SYSTOLIC BLOOD PRESSURE: 139 MMHG | TEMPERATURE: 97.2 F | OXYGEN SATURATION: 97 % | DIASTOLIC BLOOD PRESSURE: 75 MMHG | HEART RATE: 74 BPM | RESPIRATION RATE: 16 BRPM

## 2020-08-02 DIAGNOSIS — N39.0 RECURRENT UTI: Primary | ICD-10-CM

## 2020-08-02 PROCEDURE — 6360000002 HC RX W HCPCS: Performed by: SPECIALIST

## 2020-08-02 PROCEDURE — 2580000003 HC RX 258: Performed by: SPECIALIST

## 2020-08-02 PROCEDURE — 96365 THER/PROPH/DIAG IV INF INIT: CPT

## 2020-08-02 RX ORDER — SODIUM CHLORIDE 0.9 % (FLUSH) 0.9 %
10 SYRINGE (ML) INJECTION PRN
Status: DISCONTINUED | OUTPATIENT
Start: 2020-08-02 | End: 2020-08-03 | Stop reason: HOSPADM

## 2020-08-02 RX ORDER — HEPARIN SODIUM (PORCINE) LOCK FLUSH IV SOLN 100 UNIT/ML 100 UNIT/ML
500 SOLUTION INTRAVENOUS PRN
Status: CANCELLED | OUTPATIENT
Start: 2020-08-03

## 2020-08-02 RX ORDER — HEPARIN SODIUM (PORCINE) LOCK FLUSH IV SOLN 100 UNIT/ML 100 UNIT/ML
500 SOLUTION INTRAVENOUS PRN
Status: DISCONTINUED | OUTPATIENT
Start: 2020-08-02 | End: 2020-08-03 | Stop reason: HOSPADM

## 2020-08-02 RX ORDER — SODIUM CHLORIDE 0.9 % (FLUSH) 0.9 %
10 SYRINGE (ML) INJECTION PRN
Status: CANCELLED | OUTPATIENT
Start: 2020-08-03

## 2020-08-02 RX ADMIN — Medication 500 UNITS: at 08:42

## 2020-08-02 RX ADMIN — SODIUM CHLORIDE, PRESERVATIVE FREE 10 ML: 5 INJECTION INTRAVENOUS at 08:42

## 2020-08-02 RX ADMIN — SODIUM CHLORIDE, PRESERVATIVE FREE 10 ML: 5 INJECTION INTRAVENOUS at 08:15

## 2020-08-02 RX ADMIN — CEFEPIME 2 G: 2 INJECTION, POWDER, FOR SOLUTION INTRAVENOUS at 08:16

## 2020-08-03 ENCOUNTER — HOSPITAL ENCOUNTER (OUTPATIENT)
Dept: INFUSION THERAPY | Age: 75
Setting detail: INFUSION SERIES
Discharge: HOME | End: 2020-08-03
Payer: MEDICARE

## 2020-08-03 VITALS
OXYGEN SATURATION: 97 % | HEART RATE: 62 BPM | DIASTOLIC BLOOD PRESSURE: 66 MMHG | SYSTOLIC BLOOD PRESSURE: 141 MMHG | RESPIRATION RATE: 22 BRPM | TEMPERATURE: 98.1 F

## 2020-08-03 DIAGNOSIS — N39.0 RECURRENT UTI: Primary | ICD-10-CM

## 2020-08-03 LAB
ALBUMIN SERPL-MCNC: 4 G/DL (ref 3.5–5.2)
ALP BLD-CCNC: 53 U/L (ref 40–129)
ALT SERPL-CCNC: 14 U/L (ref 0–40)
ANION GAP SERPL CALCULATED.3IONS-SCNC: 15 MMOL/L (ref 7–16)
AST SERPL-CCNC: 18 U/L (ref 0–39)
BASOPHILS ABSOLUTE: 0.03 E9/L (ref 0–0.2)
BASOPHILS RELATIVE PERCENT: 0.5 % (ref 0–2)
BILIRUB SERPL-MCNC: 0.5 MG/DL (ref 0–1.2)
BUN BLDV-MCNC: 43 MG/DL (ref 8–23)
C-REACTIVE PROTEIN: 0.2 MG/DL (ref 0–0.4)
CALCIUM SERPL-MCNC: 9.3 MG/DL (ref 8.6–10.2)
CHLORIDE BLD-SCNC: 101 MMOL/L (ref 98–107)
CO2: 21 MMOL/L (ref 22–29)
CREAT SERPL-MCNC: 1.5 MG/DL (ref 0.7–1.2)
EOSINOPHILS ABSOLUTE: 0.28 E9/L (ref 0.05–0.5)
EOSINOPHILS RELATIVE PERCENT: 5 % (ref 0–6)
GFR AFRICAN AMERICAN: 55
GFR NON-AFRICAN AMERICAN: 46 ML/MIN/1.73
GLUCOSE BLD-MCNC: 146 MG/DL (ref 74–99)
HCT VFR BLD CALC: 40.1 % (ref 37–54)
HEMOGLOBIN: 12.4 G/DL (ref 12.5–16.5)
IMMATURE GRANULOCYTES #: 0.01 E9/L
IMMATURE GRANULOCYTES %: 0.2 % (ref 0–5)
LYMPHOCYTES ABSOLUTE: 1.35 E9/L (ref 1.5–4)
LYMPHOCYTES RELATIVE PERCENT: 24.1 % (ref 20–42)
MCH RBC QN AUTO: 26.2 PG (ref 26–35)
MCHC RBC AUTO-ENTMCNC: 30.9 % (ref 32–34.5)
MCV RBC AUTO: 84.8 FL (ref 80–99.9)
MONOCYTES ABSOLUTE: 0.47 E9/L (ref 0.1–0.95)
MONOCYTES RELATIVE PERCENT: 8.4 % (ref 2–12)
NEUTROPHILS ABSOLUTE: 3.47 E9/L (ref 1.8–7.3)
NEUTROPHILS RELATIVE PERCENT: 61.8 % (ref 43–80)
PDW BLD-RTO: 15.4 FL (ref 11.5–15)
PLATELET # BLD: 215 E9/L (ref 130–450)
PMV BLD AUTO: 12 FL (ref 7–12)
POTASSIUM SERPL-SCNC: 4.2 MMOL/L (ref 3.5–5)
RBC # BLD: 4.73 E12/L (ref 3.8–5.8)
SEDIMENTATION RATE, ERYTHROCYTE: 12 MM/HR (ref 0–15)
SODIUM BLD-SCNC: 137 MMOL/L (ref 132–146)
TOTAL PROTEIN: 7.2 G/DL (ref 6.4–8.3)
WBC # BLD: 5.6 E9/L (ref 4.5–11.5)

## 2020-08-03 PROCEDURE — 2580000003 HC RX 258: Performed by: SPECIALIST

## 2020-08-03 PROCEDURE — 36415 COLL VENOUS BLD VENIPUNCTURE: CPT

## 2020-08-03 PROCEDURE — 6360000002 HC RX W HCPCS: Performed by: SPECIALIST

## 2020-08-03 PROCEDURE — 36592 COLLECT BLOOD FROM PICC: CPT

## 2020-08-03 PROCEDURE — 85025 COMPLETE CBC W/AUTO DIFF WBC: CPT

## 2020-08-03 PROCEDURE — 85651 RBC SED RATE NONAUTOMATED: CPT

## 2020-08-03 PROCEDURE — 96365 THER/PROPH/DIAG IV INF INIT: CPT

## 2020-08-03 PROCEDURE — 86140 C-REACTIVE PROTEIN: CPT

## 2020-08-03 PROCEDURE — 80053 COMPREHEN METABOLIC PANEL: CPT

## 2020-08-03 RX ORDER — SODIUM CHLORIDE 0.9 % (FLUSH) 0.9 %
10 SYRINGE (ML) INJECTION PRN
Status: CANCELLED | OUTPATIENT
Start: 2020-08-04

## 2020-08-03 RX ORDER — HEPARIN SODIUM (PORCINE) LOCK FLUSH IV SOLN 100 UNIT/ML 100 UNIT/ML
500 SOLUTION INTRAVENOUS PRN
Status: DISCONTINUED | OUTPATIENT
Start: 2020-08-03 | End: 2020-08-04 | Stop reason: HOSPADM

## 2020-08-03 RX ORDER — SODIUM CHLORIDE 0.9 % (FLUSH) 0.9 %
10 SYRINGE (ML) INJECTION PRN
Status: DISCONTINUED | OUTPATIENT
Start: 2020-08-03 | End: 2020-08-04 | Stop reason: HOSPADM

## 2020-08-03 RX ORDER — HEPARIN SODIUM (PORCINE) LOCK FLUSH IV SOLN 100 UNIT/ML 100 UNIT/ML
500 SOLUTION INTRAVENOUS PRN
Status: CANCELLED | OUTPATIENT
Start: 2020-08-04

## 2020-08-03 RX ADMIN — SODIUM CHLORIDE, PRESERVATIVE FREE 10 ML: 5 INJECTION INTRAVENOUS at 08:16

## 2020-08-03 RX ADMIN — SODIUM CHLORIDE, PRESERVATIVE FREE 10 ML: 5 INJECTION INTRAVENOUS at 08:44

## 2020-08-03 RX ADMIN — Medication 500 UNITS: at 08:44

## 2020-08-03 RX ADMIN — CEFEPIME 2 G: 2 INJECTION, POWDER, FOR SOLUTION INTRAVENOUS at 08:15

## 2020-08-04 ENCOUNTER — HOSPITAL ENCOUNTER (OUTPATIENT)
Dept: INFUSION THERAPY | Age: 75
Setting detail: INFUSION SERIES
Discharge: HOME | End: 2020-08-04
Payer: MEDICARE

## 2020-08-04 DIAGNOSIS — N39.0 RECURRENT UTI: Primary | ICD-10-CM

## 2020-08-04 PROCEDURE — 6360000002 HC RX W HCPCS: Performed by: SPECIALIST

## 2020-08-04 PROCEDURE — 2580000003 HC RX 258: Performed by: SPECIALIST

## 2020-08-04 PROCEDURE — 96365 THER/PROPH/DIAG IV INF INIT: CPT

## 2020-08-04 RX ORDER — SODIUM CHLORIDE 0.9 % (FLUSH) 0.9 %
10 SYRINGE (ML) INJECTION PRN
Status: CANCELLED | OUTPATIENT
Start: 2020-08-05

## 2020-08-04 RX ORDER — HEPARIN SODIUM (PORCINE) LOCK FLUSH IV SOLN 100 UNIT/ML 100 UNIT/ML
500 SOLUTION INTRAVENOUS PRN
Status: CANCELLED | OUTPATIENT
Start: 2020-08-05

## 2020-08-04 RX ORDER — SODIUM CHLORIDE 0.9 % (FLUSH) 0.9 %
10 SYRINGE (ML) INJECTION PRN
Status: DISCONTINUED | OUTPATIENT
Start: 2020-08-04 | End: 2020-08-05 | Stop reason: HOSPADM

## 2020-08-04 RX ORDER — HEPARIN SODIUM (PORCINE) LOCK FLUSH IV SOLN 100 UNIT/ML 100 UNIT/ML
500 SOLUTION INTRAVENOUS PRN
Status: DISCONTINUED | OUTPATIENT
Start: 2020-08-04 | End: 2020-08-05 | Stop reason: HOSPADM

## 2020-08-04 RX ADMIN — CEFEPIME 2 G: 2 INJECTION, POWDER, FOR SOLUTION INTRAVENOUS at 08:02

## 2020-08-04 RX ADMIN — SODIUM CHLORIDE, PRESERVATIVE FREE 10 ML: 5 INJECTION INTRAVENOUS at 08:29

## 2020-08-04 RX ADMIN — Medication 500 UNITS: at 08:29

## 2020-08-04 RX ADMIN — SODIUM CHLORIDE, PRESERVATIVE FREE 10 ML: 5 INJECTION INTRAVENOUS at 08:02

## 2020-08-05 ENCOUNTER — HOSPITAL ENCOUNTER (OUTPATIENT)
Dept: INFUSION THERAPY | Age: 75
Setting detail: INFUSION SERIES
Discharge: HOME | End: 2020-08-05
Payer: MEDICARE

## 2020-08-05 VITALS
SYSTOLIC BLOOD PRESSURE: 143 MMHG | DIASTOLIC BLOOD PRESSURE: 65 MMHG | OXYGEN SATURATION: 96 % | HEART RATE: 66 BPM | RESPIRATION RATE: 22 BRPM | TEMPERATURE: 98.4 F

## 2020-08-05 DIAGNOSIS — N39.0 RECURRENT UTI: Primary | ICD-10-CM

## 2020-08-05 PROCEDURE — 99211 OFF/OP EST MAY X REQ PHY/QHP: CPT

## 2020-08-05 PROCEDURE — 2580000003 HC RX 258: Performed by: SPECIALIST

## 2020-08-05 PROCEDURE — 96365 THER/PROPH/DIAG IV INF INIT: CPT

## 2020-08-05 PROCEDURE — 6360000002 HC RX W HCPCS: Performed by: SPECIALIST

## 2020-08-05 RX ORDER — HEPARIN SODIUM (PORCINE) LOCK FLUSH IV SOLN 100 UNIT/ML 100 UNIT/ML
500 SOLUTION INTRAVENOUS PRN
Status: DISCONTINUED | OUTPATIENT
Start: 2020-08-05 | End: 2020-08-06 | Stop reason: HOSPADM

## 2020-08-05 RX ORDER — SODIUM CHLORIDE 0.9 % (FLUSH) 0.9 %
10 SYRINGE (ML) INJECTION PRN
Status: CANCELLED | OUTPATIENT
Start: 2020-08-06

## 2020-08-05 RX ORDER — SODIUM CHLORIDE 0.9 % (FLUSH) 0.9 %
10 SYRINGE (ML) INJECTION PRN
Status: DISCONTINUED | OUTPATIENT
Start: 2020-08-05 | End: 2020-08-06 | Stop reason: HOSPADM

## 2020-08-05 RX ORDER — HEPARIN SODIUM (PORCINE) LOCK FLUSH IV SOLN 100 UNIT/ML 100 UNIT/ML
500 SOLUTION INTRAVENOUS PRN
Status: CANCELLED | OUTPATIENT
Start: 2020-08-06

## 2020-08-05 RX ADMIN — SODIUM CHLORIDE, PRESERVATIVE FREE 10 ML: 5 INJECTION INTRAVENOUS at 08:37

## 2020-08-05 RX ADMIN — CEFEPIME 2 G: 2 INJECTION, POWDER, FOR SOLUTION INTRAVENOUS at 08:07

## 2020-08-05 RX ADMIN — SODIUM CHLORIDE, PRESERVATIVE FREE 10 ML: 5 INJECTION INTRAVENOUS at 08:08

## 2020-08-05 RX ADMIN — Medication 500 UNITS: at 08:37

## 2020-08-06 ENCOUNTER — HOSPITAL ENCOUNTER (OUTPATIENT)
Dept: INFUSION THERAPY | Age: 75
Setting detail: INFUSION SERIES
Discharge: HOME | End: 2020-08-06
Payer: MEDICARE

## 2020-08-06 VITALS
HEART RATE: 70 BPM | DIASTOLIC BLOOD PRESSURE: 68 MMHG | RESPIRATION RATE: 24 BRPM | OXYGEN SATURATION: 97 % | TEMPERATURE: 97.7 F | SYSTOLIC BLOOD PRESSURE: 159 MMHG

## 2020-08-06 DIAGNOSIS — N39.0 RECURRENT UTI: Primary | ICD-10-CM

## 2020-08-06 LAB
ALBUMIN SERPL-MCNC: 4.1 G/DL (ref 3.5–5.2)
ALP BLD-CCNC: 55 U/L (ref 40–129)
ALT SERPL-CCNC: 13 U/L (ref 0–40)
ANION GAP SERPL CALCULATED.3IONS-SCNC: 15 MMOL/L (ref 7–16)
AST SERPL-CCNC: 17 U/L (ref 0–39)
BASOPHILS ABSOLUTE: 0.04 E9/L (ref 0–0.2)
BASOPHILS RELATIVE PERCENT: 0.7 % (ref 0–2)
BILIRUB SERPL-MCNC: 0.5 MG/DL (ref 0–1.2)
BUN BLDV-MCNC: 46 MG/DL (ref 8–23)
CALCIUM SERPL-MCNC: 9.2 MG/DL (ref 8.6–10.2)
CHLORIDE BLD-SCNC: 103 MMOL/L (ref 98–107)
CO2: 21 MMOL/L (ref 22–29)
CREAT SERPL-MCNC: 1.6 MG/DL (ref 0.7–1.2)
EOSINOPHILS ABSOLUTE: 0.29 E9/L (ref 0.05–0.5)
EOSINOPHILS RELATIVE PERCENT: 5.2 % (ref 0–6)
GFR AFRICAN AMERICAN: 51
GFR NON-AFRICAN AMERICAN: 43 ML/MIN/1.73
GLUCOSE BLD-MCNC: 131 MG/DL (ref 74–99)
HCT VFR BLD CALC: 37.4 % (ref 37–54)
HEMOGLOBIN: 11.9 G/DL (ref 12.5–16.5)
IMMATURE GRANULOCYTES #: 0.01 E9/L
IMMATURE GRANULOCYTES %: 0.2 % (ref 0–5)
LYMPHOCYTES ABSOLUTE: 1.38 E9/L (ref 1.5–4)
LYMPHOCYTES RELATIVE PERCENT: 24.8 % (ref 20–42)
MCH RBC QN AUTO: 26.8 PG (ref 26–35)
MCHC RBC AUTO-ENTMCNC: 31.8 % (ref 32–34.5)
MCV RBC AUTO: 84.2 FL (ref 80–99.9)
MONOCYTES ABSOLUTE: 0.37 E9/L (ref 0.1–0.95)
MONOCYTES RELATIVE PERCENT: 6.6 % (ref 2–12)
NEUTROPHILS ABSOLUTE: 3.48 E9/L (ref 1.8–7.3)
NEUTROPHILS RELATIVE PERCENT: 62.5 % (ref 43–80)
PDW BLD-RTO: 15.4 FL (ref 11.5–15)
PLATELET # BLD: 206 E9/L (ref 130–450)
PMV BLD AUTO: 12.6 FL (ref 7–12)
POTASSIUM SERPL-SCNC: 4.2 MMOL/L (ref 3.5–5)
RBC # BLD: 4.44 E12/L (ref 3.8–5.8)
SODIUM BLD-SCNC: 139 MMOL/L (ref 132–146)
TOTAL PROTEIN: 7.1 G/DL (ref 6.4–8.3)
WBC # BLD: 5.6 E9/L (ref 4.5–11.5)

## 2020-08-06 PROCEDURE — 36415 COLL VENOUS BLD VENIPUNCTURE: CPT

## 2020-08-06 PROCEDURE — 80053 COMPREHEN METABOLIC PANEL: CPT

## 2020-08-06 PROCEDURE — 96365 THER/PROPH/DIAG IV INF INIT: CPT

## 2020-08-06 PROCEDURE — 6360000002 HC RX W HCPCS: Performed by: SPECIALIST

## 2020-08-06 PROCEDURE — 2580000003 HC RX 258: Performed by: SPECIALIST

## 2020-08-06 PROCEDURE — 85025 COMPLETE CBC W/AUTO DIFF WBC: CPT

## 2020-08-06 RX ORDER — SODIUM CHLORIDE 0.9 % (FLUSH) 0.9 %
10 SYRINGE (ML) INJECTION PRN
Status: CANCELLED | OUTPATIENT
Start: 2020-08-07

## 2020-08-06 RX ORDER — SODIUM CHLORIDE 0.9 % (FLUSH) 0.9 %
10 SYRINGE (ML) INJECTION PRN
Status: DISCONTINUED | OUTPATIENT
Start: 2020-08-06 | End: 2020-08-07 | Stop reason: HOSPADM

## 2020-08-06 RX ORDER — HEPARIN SODIUM (PORCINE) LOCK FLUSH IV SOLN 100 UNIT/ML 100 UNIT/ML
500 SOLUTION INTRAVENOUS PRN
Status: CANCELLED | OUTPATIENT
Start: 2020-08-07

## 2020-08-06 RX ORDER — HEPARIN SODIUM (PORCINE) LOCK FLUSH IV SOLN 100 UNIT/ML 100 UNIT/ML
500 SOLUTION INTRAVENOUS PRN
Status: DISCONTINUED | OUTPATIENT
Start: 2020-08-06 | End: 2020-08-07 | Stop reason: HOSPADM

## 2020-08-06 RX ADMIN — SODIUM CHLORIDE, PRESERVATIVE FREE 10 ML: 5 INJECTION INTRAVENOUS at 08:09

## 2020-08-06 RX ADMIN — SODIUM CHLORIDE, PRESERVATIVE FREE 10 ML: 5 INJECTION INTRAVENOUS at 08:34

## 2020-08-06 RX ADMIN — CEFEPIME 2 G: 2 INJECTION, POWDER, FOR SOLUTION INTRAVENOUS at 08:09

## 2020-08-06 RX ADMIN — Medication 500 UNITS: at 08:34

## 2020-08-07 ENCOUNTER — HOSPITAL ENCOUNTER (OUTPATIENT)
Dept: INFUSION THERAPY | Age: 75
Setting detail: INFUSION SERIES
Discharge: HOME | End: 2020-08-07
Payer: MEDICARE

## 2020-08-07 DIAGNOSIS — N39.0 RECURRENT UTI: Primary | ICD-10-CM

## 2020-08-07 PROCEDURE — 6360000002 HC RX W HCPCS: Performed by: SPECIALIST

## 2020-08-07 PROCEDURE — 96365 THER/PROPH/DIAG IV INF INIT: CPT

## 2020-08-07 PROCEDURE — 2580000003 HC RX 258: Performed by: SPECIALIST

## 2020-08-07 RX ORDER — HEPARIN SODIUM (PORCINE) LOCK FLUSH IV SOLN 100 UNIT/ML 100 UNIT/ML
500 SOLUTION INTRAVENOUS PRN
Status: CANCELLED | OUTPATIENT
Start: 2020-08-08

## 2020-08-07 RX ORDER — SODIUM CHLORIDE 0.9 % (FLUSH) 0.9 %
10 SYRINGE (ML) INJECTION PRN
Status: DISCONTINUED | OUTPATIENT
Start: 2020-08-07 | End: 2020-08-08 | Stop reason: HOSPADM

## 2020-08-07 RX ORDER — SODIUM CHLORIDE 0.9 % (FLUSH) 0.9 %
10 SYRINGE (ML) INJECTION PRN
Status: CANCELLED | OUTPATIENT
Start: 2020-08-08

## 2020-08-07 RX ORDER — HEPARIN SODIUM (PORCINE) LOCK FLUSH IV SOLN 100 UNIT/ML 100 UNIT/ML
500 SOLUTION INTRAVENOUS PRN
Status: DISCONTINUED | OUTPATIENT
Start: 2020-08-07 | End: 2020-08-08 | Stop reason: HOSPADM

## 2020-08-07 RX ADMIN — Medication 500 UNITS: at 08:36

## 2020-08-07 RX ADMIN — SODIUM CHLORIDE, PRESERVATIVE FREE 10 ML: 5 INJECTION INTRAVENOUS at 08:36

## 2020-08-07 RX ADMIN — CEFEPIME HYDROCHLORIDE 2 G: 2 INJECTION, POWDER, FOR SOLUTION INTRAVENOUS at 08:09

## 2020-08-07 RX ADMIN — SODIUM CHLORIDE, PRESERVATIVE FREE 10 ML: 5 INJECTION INTRAVENOUS at 08:09

## 2020-08-08 ENCOUNTER — HOSPITAL ENCOUNTER (OUTPATIENT)
Dept: INFUSION THERAPY | Age: 75
Setting detail: INFUSION SERIES
Discharge: HOME | End: 2020-08-08
Payer: MEDICARE

## 2020-08-08 VITALS
DIASTOLIC BLOOD PRESSURE: 84 MMHG | TEMPERATURE: 98.1 F | OXYGEN SATURATION: 98 % | SYSTOLIC BLOOD PRESSURE: 165 MMHG | HEART RATE: 72 BPM | RESPIRATION RATE: 14 BRPM

## 2020-08-08 DIAGNOSIS — N39.0 RECURRENT UTI: Primary | ICD-10-CM

## 2020-08-08 PROCEDURE — 2580000003 HC RX 258: Performed by: SPECIALIST

## 2020-08-08 PROCEDURE — 6360000002 HC RX W HCPCS: Performed by: SPECIALIST

## 2020-08-08 PROCEDURE — 96365 THER/PROPH/DIAG IV INF INIT: CPT

## 2020-08-08 RX ORDER — SODIUM CHLORIDE 0.9 % (FLUSH) 0.9 %
10 SYRINGE (ML) INJECTION PRN
Status: CANCELLED | OUTPATIENT
Start: 2020-08-09

## 2020-08-08 RX ORDER — HEPARIN SODIUM (PORCINE) LOCK FLUSH IV SOLN 100 UNIT/ML 100 UNIT/ML
500 SOLUTION INTRAVENOUS PRN
Status: CANCELLED | OUTPATIENT
Start: 2020-08-09

## 2020-08-08 RX ORDER — HEPARIN SODIUM (PORCINE) LOCK FLUSH IV SOLN 100 UNIT/ML 100 UNIT/ML
500 SOLUTION INTRAVENOUS PRN
Status: DISCONTINUED | OUTPATIENT
Start: 2020-08-08 | End: 2020-08-09 | Stop reason: HOSPADM

## 2020-08-08 RX ORDER — SODIUM CHLORIDE 0.9 % (FLUSH) 0.9 %
10 SYRINGE (ML) INJECTION PRN
Status: DISCONTINUED | OUTPATIENT
Start: 2020-08-08 | End: 2020-08-09 | Stop reason: HOSPADM

## 2020-08-08 RX ADMIN — CEFEPIME HYDROCHLORIDE 2 G: 2 INJECTION, POWDER, FOR SOLUTION INTRAVENOUS at 08:27

## 2020-08-08 RX ADMIN — Medication 500 UNITS: at 08:28

## 2020-08-08 RX ADMIN — SODIUM CHLORIDE, PRESERVATIVE FREE 10 ML: 5 INJECTION INTRAVENOUS at 08:28

## 2020-08-09 ENCOUNTER — HOSPITAL ENCOUNTER (OUTPATIENT)
Dept: INFUSION THERAPY | Age: 75
Setting detail: INFUSION SERIES
Discharge: HOME | End: 2020-08-09
Payer: MEDICARE

## 2020-08-09 VITALS
DIASTOLIC BLOOD PRESSURE: 66 MMHG | HEART RATE: 74 BPM | TEMPERATURE: 98.2 F | RESPIRATION RATE: 12 BRPM | SYSTOLIC BLOOD PRESSURE: 143 MMHG | OXYGEN SATURATION: 99 %

## 2020-08-09 DIAGNOSIS — N39.0 RECURRENT UTI: Primary | ICD-10-CM

## 2020-08-09 PROCEDURE — 6360000002 HC RX W HCPCS: Performed by: SPECIALIST

## 2020-08-09 PROCEDURE — 2580000003 HC RX 258: Performed by: SPECIALIST

## 2020-08-09 PROCEDURE — 96365 THER/PROPH/DIAG IV INF INIT: CPT

## 2020-08-09 RX ORDER — HEPARIN SODIUM (PORCINE) LOCK FLUSH IV SOLN 100 UNIT/ML 100 UNIT/ML
500 SOLUTION INTRAVENOUS PRN
Status: CANCELLED | OUTPATIENT
Start: 2020-08-10

## 2020-08-09 RX ORDER — SODIUM CHLORIDE 0.9 % (FLUSH) 0.9 %
10 SYRINGE (ML) INJECTION PRN
Status: CANCELLED | OUTPATIENT
Start: 2020-08-10

## 2020-08-09 RX ORDER — HEPARIN SODIUM (PORCINE) LOCK FLUSH IV SOLN 100 UNIT/ML 100 UNIT/ML
500 SOLUTION INTRAVENOUS PRN
Status: DISCONTINUED | OUTPATIENT
Start: 2020-08-09 | End: 2020-08-10 | Stop reason: HOSPADM

## 2020-08-09 RX ORDER — SODIUM CHLORIDE 0.9 % (FLUSH) 0.9 %
10 SYRINGE (ML) INJECTION PRN
Status: DISCONTINUED | OUTPATIENT
Start: 2020-08-09 | End: 2020-08-10 | Stop reason: HOSPADM

## 2020-08-09 RX ADMIN — CEFEPIME HYDROCHLORIDE 2 G: 2 INJECTION, POWDER, FOR SOLUTION INTRAVENOUS at 08:13

## 2020-08-09 RX ADMIN — Medication 500 UNITS: at 08:13

## 2020-08-09 RX ADMIN — SODIUM CHLORIDE, PRESERVATIVE FREE 10 ML: 5 INJECTION INTRAVENOUS at 08:14

## 2020-08-10 ENCOUNTER — HOSPITAL ENCOUNTER (OUTPATIENT)
Dept: INFUSION THERAPY | Age: 75
Setting detail: INFUSION SERIES
Discharge: HOME | End: 2020-08-10
Payer: MEDICARE

## 2020-08-10 DIAGNOSIS — N39.0 RECURRENT UTI: Primary | ICD-10-CM

## 2020-08-10 LAB
ALBUMIN SERPL-MCNC: 4 G/DL (ref 3.5–5.2)
ALP BLD-CCNC: 56 U/L (ref 40–129)
ALT SERPL-CCNC: 15 U/L (ref 0–40)
ANION GAP SERPL CALCULATED.3IONS-SCNC: 13 MMOL/L (ref 7–16)
AST SERPL-CCNC: 18 U/L (ref 0–39)
BASOPHILS ABSOLUTE: 0.03 E9/L (ref 0–0.2)
BASOPHILS RELATIVE PERCENT: 0.6 % (ref 0–2)
BILIRUB SERPL-MCNC: 0.3 MG/DL (ref 0–1.2)
BUN BLDV-MCNC: 59 MG/DL (ref 8–23)
C-REACTIVE PROTEIN: 0.3 MG/DL (ref 0–0.4)
CALCIUM SERPL-MCNC: 9.4 MG/DL (ref 8.6–10.2)
CHLORIDE BLD-SCNC: 107 MMOL/L (ref 98–107)
CO2: 21 MMOL/L (ref 22–29)
CREAT SERPL-MCNC: 1.7 MG/DL (ref 0.7–1.2)
EOSINOPHILS ABSOLUTE: 0.31 E9/L (ref 0.05–0.5)
EOSINOPHILS RELATIVE PERCENT: 6 % (ref 0–6)
GFR AFRICAN AMERICAN: 48
GFR NON-AFRICAN AMERICAN: 40 ML/MIN/1.73
GLUCOSE BLD-MCNC: 94 MG/DL (ref 74–99)
HCT VFR BLD CALC: 38.2 % (ref 37–54)
HEMOGLOBIN: 11.9 G/DL (ref 12.5–16.5)
IMMATURE GRANULOCYTES #: 0.02 E9/L
IMMATURE GRANULOCYTES %: 0.4 % (ref 0–5)
LYMPHOCYTES ABSOLUTE: 1.26 E9/L (ref 1.5–4)
LYMPHOCYTES RELATIVE PERCENT: 24.4 % (ref 20–42)
MCH RBC QN AUTO: 26.4 PG (ref 26–35)
MCHC RBC AUTO-ENTMCNC: 31.2 % (ref 32–34.5)
MCV RBC AUTO: 84.9 FL (ref 80–99.9)
MONOCYTES ABSOLUTE: 0.47 E9/L (ref 0.1–0.95)
MONOCYTES RELATIVE PERCENT: 9.1 % (ref 2–12)
NEUTROPHILS ABSOLUTE: 3.07 E9/L (ref 1.8–7.3)
NEUTROPHILS RELATIVE PERCENT: 59.5 % (ref 43–80)
PDW BLD-RTO: 15.5 FL (ref 11.5–15)
PLATELET # BLD: 204 E9/L (ref 130–450)
PMV BLD AUTO: 12.4 FL (ref 7–12)
POTASSIUM SERPL-SCNC: 5 MMOL/L (ref 3.5–5)
RBC # BLD: 4.5 E12/L (ref 3.8–5.8)
SEDIMENTATION RATE, ERYTHROCYTE: 12 MM/HR (ref 0–15)
SODIUM BLD-SCNC: 141 MMOL/L (ref 132–146)
TOTAL PROTEIN: 7.3 G/DL (ref 6.4–8.3)
WBC # BLD: 5.2 E9/L (ref 4.5–11.5)

## 2020-08-10 PROCEDURE — 6360000002 HC RX W HCPCS: Performed by: SPECIALIST

## 2020-08-10 PROCEDURE — 85651 RBC SED RATE NONAUTOMATED: CPT

## 2020-08-10 PROCEDURE — 36415 COLL VENOUS BLD VENIPUNCTURE: CPT

## 2020-08-10 PROCEDURE — 86140 C-REACTIVE PROTEIN: CPT

## 2020-08-10 PROCEDURE — 96365 THER/PROPH/DIAG IV INF INIT: CPT

## 2020-08-10 PROCEDURE — 85025 COMPLETE CBC W/AUTO DIFF WBC: CPT

## 2020-08-10 PROCEDURE — 36592 COLLECT BLOOD FROM PICC: CPT

## 2020-08-10 PROCEDURE — 80053 COMPREHEN METABOLIC PANEL: CPT

## 2020-08-10 PROCEDURE — 2580000003 HC RX 258: Performed by: SPECIALIST

## 2020-08-10 RX ORDER — HEPARIN SODIUM (PORCINE) LOCK FLUSH IV SOLN 100 UNIT/ML 100 UNIT/ML
500 SOLUTION INTRAVENOUS PRN
Status: CANCELLED | OUTPATIENT
Start: 2020-08-10

## 2020-08-10 RX ORDER — SODIUM CHLORIDE 0.9 % (FLUSH) 0.9 %
10 SYRINGE (ML) INJECTION PRN
Status: DISCONTINUED | OUTPATIENT
Start: 2020-08-10 | End: 2020-08-11 | Stop reason: HOSPADM

## 2020-08-10 RX ORDER — HEPARIN SODIUM (PORCINE) LOCK FLUSH IV SOLN 100 UNIT/ML 100 UNIT/ML
500 SOLUTION INTRAVENOUS PRN
Status: DISCONTINUED | OUTPATIENT
Start: 2020-08-10 | End: 2020-08-11 | Stop reason: HOSPADM

## 2020-08-10 RX ORDER — SODIUM CHLORIDE 0.9 % (FLUSH) 0.9 %
10 SYRINGE (ML) INJECTION PRN
Status: CANCELLED | OUTPATIENT
Start: 2020-08-10

## 2020-08-10 RX ADMIN — Medication 10 ML: at 09:39

## 2020-08-10 RX ADMIN — CEFEPIME HYDROCHLORIDE 2 G: 2 INJECTION, POWDER, FOR SOLUTION INTRAVENOUS at 09:04

## 2020-08-10 RX ADMIN — Medication 10 ML: at 09:04

## 2020-08-10 RX ADMIN — Medication 500 UNITS: at 09:40

## 2020-08-11 ENCOUNTER — HOSPITAL ENCOUNTER (OUTPATIENT)
Dept: INFUSION THERAPY | Age: 75
Setting detail: INFUSION SERIES
Discharge: HOME | End: 2020-08-11
Payer: MEDICARE

## 2020-08-11 VITALS
OXYGEN SATURATION: 99 % | RESPIRATION RATE: 14 BRPM | TEMPERATURE: 98.2 F | HEART RATE: 74 BPM | DIASTOLIC BLOOD PRESSURE: 68 MMHG | SYSTOLIC BLOOD PRESSURE: 140 MMHG

## 2020-08-11 DIAGNOSIS — N39.0 RECURRENT UTI: Primary | ICD-10-CM

## 2020-08-11 PROCEDURE — 96365 THER/PROPH/DIAG IV INF INIT: CPT

## 2020-08-11 PROCEDURE — 2580000003 HC RX 258: Performed by: SPECIALIST

## 2020-08-11 PROCEDURE — 6360000002 HC RX W HCPCS: Performed by: SPECIALIST

## 2020-08-11 RX ORDER — HEPARIN SODIUM (PORCINE) LOCK FLUSH IV SOLN 100 UNIT/ML 100 UNIT/ML
500 SOLUTION INTRAVENOUS PRN
Status: CANCELLED | OUTPATIENT
Start: 2020-08-11

## 2020-08-11 RX ORDER — SODIUM CHLORIDE 0.9 % (FLUSH) 0.9 %
10 SYRINGE (ML) INJECTION PRN
Status: DISCONTINUED | OUTPATIENT
Start: 2020-08-11 | End: 2020-08-12 | Stop reason: HOSPADM

## 2020-08-11 RX ORDER — HEPARIN SODIUM (PORCINE) LOCK FLUSH IV SOLN 100 UNIT/ML 100 UNIT/ML
500 SOLUTION INTRAVENOUS PRN
Status: DISCONTINUED | OUTPATIENT
Start: 2020-08-11 | End: 2020-08-12 | Stop reason: HOSPADM

## 2020-08-11 RX ORDER — SODIUM CHLORIDE 0.9 % (FLUSH) 0.9 %
10 SYRINGE (ML) INJECTION PRN
Status: CANCELLED | OUTPATIENT
Start: 2020-08-11

## 2020-08-11 RX ADMIN — CEFEPIME HYDROCHLORIDE 2 G: 2 INJECTION, POWDER, FOR SOLUTION INTRAVENOUS at 08:47

## 2020-08-11 RX ADMIN — Medication 500 UNITS: at 09:09

## 2020-08-11 RX ADMIN — SODIUM CHLORIDE, PRESERVATIVE FREE 10 ML: 5 INJECTION INTRAVENOUS at 09:09

## 2020-08-11 RX ADMIN — SODIUM CHLORIDE, PRESERVATIVE FREE 10 ML: 5 INJECTION INTRAVENOUS at 08:47

## 2020-08-12 ENCOUNTER — HOSPITAL ENCOUNTER (OUTPATIENT)
Dept: INFUSION THERAPY | Age: 75
Setting detail: INFUSION SERIES
Discharge: HOME | End: 2020-08-12
Payer: MEDICARE

## 2020-08-12 DIAGNOSIS — N39.0 RECURRENT UTI: Primary | ICD-10-CM

## 2020-08-12 PROCEDURE — 96523 IRRIG DRUG DELIVERY DEVICE: CPT

## 2020-08-12 PROCEDURE — 2580000003 HC RX 258: Performed by: SPECIALIST

## 2020-08-12 PROCEDURE — 99211 OFF/OP EST MAY X REQ PHY/QHP: CPT

## 2020-08-12 RX ORDER — HEPARIN SODIUM (PORCINE) LOCK FLUSH IV SOLN 100 UNIT/ML 100 UNIT/ML
500 SOLUTION INTRAVENOUS PRN
Status: DISCONTINUED | OUTPATIENT
Start: 2020-08-12 | End: 2020-08-13 | Stop reason: HOSPADM

## 2020-08-12 RX ORDER — SODIUM CHLORIDE 0.9 % (FLUSH) 0.9 %
10 SYRINGE (ML) INJECTION PRN
Status: CANCELLED | OUTPATIENT
Start: 2020-08-12

## 2020-08-12 RX ORDER — HEPARIN SODIUM (PORCINE) LOCK FLUSH IV SOLN 100 UNIT/ML 100 UNIT/ML
500 SOLUTION INTRAVENOUS PRN
Status: CANCELLED | OUTPATIENT
Start: 2020-08-12

## 2020-08-12 RX ORDER — SODIUM CHLORIDE 0.9 % (FLUSH) 0.9 %
10 SYRINGE (ML) INJECTION PRN
Status: DISCONTINUED | OUTPATIENT
Start: 2020-08-12 | End: 2020-08-13 | Stop reason: HOSPADM

## 2020-08-12 RX ADMIN — SODIUM CHLORIDE, PRESERVATIVE FREE 10 ML: 5 INJECTION INTRAVENOUS at 10:22

## 2023-03-29 LAB
ANION GAP IN SER/PLAS: 13 MMOL/L (ref 10–20)
CALCIUM (MG/DL) IN SER/PLAS: 9.1 MG/DL (ref 8.6–10.3)
CARBON DIOXIDE, TOTAL (MMOL/L) IN SER/PLAS: 24 MMOL/L (ref 21–32)
CHLORIDE (MMOL/L) IN SER/PLAS: 105 MMOL/L (ref 98–107)
CREATININE (MG/DL) IN SER/PLAS: 2.83 MG/DL (ref 0.5–1.3)
GFR MALE: 22 ML/MIN/1.73M2
GLUCOSE (MG/DL) IN SER/PLAS: 78 MG/DL (ref 74–99)
POTASSIUM (MMOL/L) IN SER/PLAS: 4.7 MMOL/L (ref 3.5–5.3)
SODIUM (MMOL/L) IN SER/PLAS: 137 MMOL/L (ref 136–145)
UREA NITROGEN (MG/DL) IN SER/PLAS: 55 MG/DL (ref 6–23)

## 2023-05-15 DIAGNOSIS — E03.9 HYPOTHYROIDISM, UNSPECIFIED TYPE: ICD-10-CM

## 2023-05-15 RX ORDER — LEVOTHYROXINE SODIUM 75 UG/1
75 TABLET ORAL DAILY
Qty: 90 TABLET | Refills: 2 | Status: SHIPPED | OUTPATIENT
Start: 2023-05-15 | End: 2024-04-05

## 2023-08-16 DIAGNOSIS — I10 HYPERTENSION, UNSPECIFIED TYPE: ICD-10-CM

## 2023-08-16 DIAGNOSIS — E78.5 HYPERLIPIDEMIA, UNSPECIFIED HYPERLIPIDEMIA TYPE: ICD-10-CM

## 2023-08-19 RX ORDER — AMLODIPINE BESYLATE 10 MG/1
10 TABLET ORAL DAILY
Qty: 90 TABLET | Refills: 3 | Status: SHIPPED | OUTPATIENT
Start: 2023-08-19

## 2023-08-19 RX ORDER — ATORVASTATIN CALCIUM 20 MG/1
20 TABLET, FILM COATED ORAL DAILY
Qty: 90 TABLET | Refills: 1 | Status: SHIPPED | OUTPATIENT
Start: 2023-08-19 | End: 2024-04-05

## 2023-10-20 ENCOUNTER — HOSPITAL ENCOUNTER (OUTPATIENT)
Dept: RADIOLOGY | Facility: HOSPITAL | Age: 78
Discharge: HOME | End: 2023-10-20
Payer: MEDICARE

## 2023-10-20 DIAGNOSIS — C67.9 MALIGNANT NEOPLASM OF BLADDER, UNSPECIFIED (MULTI): ICD-10-CM

## 2023-10-20 PROCEDURE — A9698 NON-RAD CONTRAST MATERIALNOC: HCPCS | Mod: MUE | Performed by: STUDENT IN AN ORGANIZED HEALTH CARE EDUCATION/TRAINING PROGRAM

## 2023-10-20 PROCEDURE — 74176 CT ABD & PELVIS W/O CONTRAST: CPT | Performed by: RADIOLOGY

## 2023-10-20 PROCEDURE — 71250 CT THORAX DX C-: CPT | Performed by: RADIOLOGY

## 2023-10-20 PROCEDURE — 74176 CT ABD & PELVIS W/O CONTRAST: CPT

## 2023-10-20 PROCEDURE — 2550000001 HC RX 255 CONTRASTS: Mod: MUE | Performed by: STUDENT IN AN ORGANIZED HEALTH CARE EDUCATION/TRAINING PROGRAM

## 2023-10-20 RX ADMIN — IOHEXOL 500 ML: 12 SOLUTION ORAL at 10:00

## 2023-10-25 ENCOUNTER — OFFICE VISIT (OUTPATIENT)
Dept: PRIMARY CARE | Facility: CLINIC | Age: 78
End: 2023-10-25
Payer: MEDICARE

## 2023-10-25 VITALS
SYSTOLIC BLOOD PRESSURE: 171 MMHG | BODY MASS INDEX: 23.02 KG/M2 | DIASTOLIC BLOOD PRESSURE: 65 MMHG | OXYGEN SATURATION: 97 % | WEIGHT: 147 LBS | TEMPERATURE: 97.5 F | HEART RATE: 68 BPM

## 2023-10-25 DIAGNOSIS — B18.2 CHRONIC HEPATITIS C WITHOUT HEPATIC COMA (MULTI): ICD-10-CM

## 2023-10-25 DIAGNOSIS — N18.4 CKD (CHRONIC KIDNEY DISEASE) STAGE 4, GFR 15-29 ML/MIN (MULTI): ICD-10-CM

## 2023-10-25 DIAGNOSIS — E03.9 HYPOTHYROIDISM, UNSPECIFIED TYPE: ICD-10-CM

## 2023-10-25 DIAGNOSIS — J44.9 CHRONIC OBSTRUCTIVE PULMONARY DISEASE, UNSPECIFIED COPD TYPE (MULTI): ICD-10-CM

## 2023-10-25 DIAGNOSIS — N99.528: ICD-10-CM

## 2023-10-25 DIAGNOSIS — Z00.00 HEALTHCARE MAINTENANCE: ICD-10-CM

## 2023-10-25 DIAGNOSIS — I10 BENIGN ESSENTIAL HYPERTENSION: ICD-10-CM

## 2023-10-25 DIAGNOSIS — Z00.00 MEDICARE ANNUAL WELLNESS VISIT, SUBSEQUENT: Primary | ICD-10-CM

## 2023-10-25 DIAGNOSIS — I73.9 PERIPHERAL VASCULAR DISEASE, UNSPECIFIED (CMS-HCC): ICD-10-CM

## 2023-10-25 PROBLEM — C67.9 BLADDER CANCER (MULTI): Status: RESOLVED | Noted: 2023-10-25 | Resolved: 2023-10-25

## 2023-10-25 PROBLEM — F10.21 ALCOHOL DEPENDENCE IN REMISSION (MULTI): Status: RESOLVED | Noted: 2023-10-25 | Resolved: 2023-10-25

## 2023-10-25 PROBLEM — F11.21 OPIOID DEPENDENCE IN REMISSION (MULTI): Status: RESOLVED | Noted: 2023-10-25 | Resolved: 2023-10-25

## 2023-10-25 PROBLEM — Z90.79 S/P RADICAL CYSTOPROSTATECTOMY: Status: ACTIVE | Noted: 2023-10-25

## 2023-10-25 PROBLEM — E44.0 MODERATE PROTEIN-CALORIE MALNUTRITION (MULTI): Status: ACTIVE | Noted: 2019-10-24

## 2023-10-25 PROBLEM — I65.23 BILATERAL CAROTID ARTERY STENOSIS: Status: RESOLVED | Noted: 2023-10-25 | Resolved: 2023-10-25

## 2023-10-25 PROBLEM — N52.32 ERECTILE DYSFUNCTION AFTER RADICAL CYSTECTOMY: Status: ACTIVE | Noted: 2023-10-25

## 2023-10-25 PROBLEM — Z90.6 S/P RADICAL CYSTOPROSTATECTOMY: Status: ACTIVE | Noted: 2023-10-25

## 2023-10-25 PROBLEM — N18.30 STAGE 3 CHRONIC KIDNEY DISEASE (MULTI): Status: ACTIVE | Noted: 2023-10-25

## 2023-10-25 PROBLEM — I48.91 ATRIAL FIBRILLATION (MULTI): Status: ACTIVE | Noted: 2023-10-25

## 2023-10-25 PROBLEM — I48.91 ATRIAL FIBRILLATION (MULTI): Status: RESOLVED | Noted: 2023-10-25 | Resolved: 2023-10-25

## 2023-10-25 PROCEDURE — 1160F RVW MEDS BY RX/DR IN RCRD: CPT | Performed by: STUDENT IN AN ORGANIZED HEALTH CARE EDUCATION/TRAINING PROGRAM

## 2023-10-25 PROCEDURE — 99214 OFFICE O/P EST MOD 30 MIN: CPT | Performed by: STUDENT IN AN ORGANIZED HEALTH CARE EDUCATION/TRAINING PROGRAM

## 2023-10-25 PROCEDURE — 3077F SYST BP >= 140 MM HG: CPT | Performed by: STUDENT IN AN ORGANIZED HEALTH CARE EDUCATION/TRAINING PROGRAM

## 2023-10-25 PROCEDURE — G0439 PPPS, SUBSEQ VISIT: HCPCS | Performed by: STUDENT IN AN ORGANIZED HEALTH CARE EDUCATION/TRAINING PROGRAM

## 2023-10-25 PROCEDURE — 1170F FXNL STATUS ASSESSED: CPT | Performed by: STUDENT IN AN ORGANIZED HEALTH CARE EDUCATION/TRAINING PROGRAM

## 2023-10-25 PROCEDURE — 3078F DIAST BP <80 MM HG: CPT | Performed by: STUDENT IN AN ORGANIZED HEALTH CARE EDUCATION/TRAINING PROGRAM

## 2023-10-25 PROCEDURE — 1125F AMNT PAIN NOTED PAIN PRSNT: CPT | Performed by: STUDENT IN AN ORGANIZED HEALTH CARE EDUCATION/TRAINING PROGRAM

## 2023-10-25 PROCEDURE — 1036F TOBACCO NON-USER: CPT | Performed by: STUDENT IN AN ORGANIZED HEALTH CARE EDUCATION/TRAINING PROGRAM

## 2023-10-25 PROCEDURE — 1159F MED LIST DOCD IN RCRD: CPT | Performed by: STUDENT IN AN ORGANIZED HEALTH CARE EDUCATION/TRAINING PROGRAM

## 2023-10-25 RX ORDER — WARFARIN SODIUM 5 MG/1
TABLET ORAL
COMMUNITY
Start: 2021-12-03

## 2023-10-25 RX ORDER — AMIODARONE HYDROCHLORIDE 200 MG/1
1 TABLET ORAL 2 TIMES DAILY
COMMUNITY
Start: 2021-09-13 | End: 2024-03-05 | Stop reason: WASHOUT

## 2023-10-25 RX ORDER — ACETAMINOPHEN 500 MG
TABLET ORAL
COMMUNITY
Start: 2021-08-26

## 2023-10-25 RX ORDER — SODIUM BICARBONATE 650 MG/1
TABLET ORAL
Qty: 270 TABLET | Refills: 10 | Status: SHIPPED | OUTPATIENT
Start: 2023-10-25

## 2023-10-25 RX ORDER — TIOTROPIUM BROMIDE 18 UG/1
CAPSULE ORAL; RESPIRATORY (INHALATION)
COMMUNITY
Start: 2021-08-26 | End: 2023-11-20 | Stop reason: SDUPTHER

## 2023-10-25 RX ORDER — CARVEDILOL 6.25 MG/1
TABLET ORAL EVERY 12 HOURS
COMMUNITY
Start: 2018-04-17 | End: 2024-03-05 | Stop reason: ALTCHOICE

## 2023-10-25 RX ORDER — LISINOPRIL 40 MG/1
1 TABLET ORAL DAILY
COMMUNITY
Start: 2019-12-02 | End: 2024-03-05 | Stop reason: DRUGHIGH

## 2023-10-25 RX ORDER — LISINOPRIL 40 MG/1
40 TABLET ORAL DAILY
Qty: 90 TABLET | Refills: 3 | Status: SHIPPED | OUTPATIENT
Start: 2023-10-25

## 2023-10-25 RX ORDER — TADALAFIL 5 MG/1
TABLET ORAL
COMMUNITY
Start: 2021-08-05 | End: 2024-03-05 | Stop reason: ALTCHOICE

## 2023-10-25 RX ORDER — ASPIRIN 81 MG/1
1 TABLET ORAL DAILY
COMMUNITY
Start: 2021-10-11

## 2023-10-25 ASSESSMENT — ACTIVITIES OF DAILY LIVING (ADL)
DRESSING: INDEPENDENT
GROCERY_SHOPPING: INDEPENDENT
BATHING: INDEPENDENT
MANAGING_FINANCES: INDEPENDENT
DOING_HOUSEWORK: INDEPENDENT
TAKING_MEDICATION: INDEPENDENT

## 2023-10-25 ASSESSMENT — PATIENT HEALTH QUESTIONNAIRE - PHQ9
1. LITTLE INTEREST OR PLEASURE IN DOING THINGS: NOT AT ALL
2. FEELING DOWN, DEPRESSED OR HOPELESS: NOT AT ALL
SUM OF ALL RESPONSES TO PHQ9 QUESTIONS 1 AND 2: 0

## 2023-10-25 NOTE — PROGRESS NOTES
Subjective   Reason for Visit: Devin Fung Jr. is an 78 y.o. male here for a Medicare Wellness visit.     Past Medical, Surgical, and Family History reviewed and updated in chart.    Reviewed all medications by prescribing practitioner or clinical pharmacist (such as prescriptions, OTCs, herbal therapies and supplements) and documented in the medical record.    HPI  PMhx including CAD s/p PCI on DAPT, HTN, bladder Ca s/p resections and BCG treatment now with stoma c/b hernia now resolved s/p surgery    Re: oncology - see urology notes. Stable since extensive surgery. Had scan done recently; all stable per pt report (I do not see radiology read in the system from this past week).    Re: HTN - not at goal. Declines changes however. Reports no sx high or low from HTN; denies blurry vision, HA, dizziness LoC CP SoB Lorenzo and leg swelling     Re: Asthma - on inhaler. Needs refill today, forms filled out of pt assistance.     Re: HM - declines all shots. CRS not indicted. PSA through urology.          PMHx, FHx, Social Hx, Surg Hx personally reviewed at this appointment. No pertinent findings and/or changes from prior (if applicable).     ROS: Denies wt gain/loss f/c HA LoC CP SOB NVDC. See HPI above, and scanned sheet (if applicable). All other systems are reviewed and are without complaint.         Patient Care Team:  Anastacio No MD as PCP - General (Internal Medicine)  Anastacio No MD as PCP - [facility] Attributed Provider  Rowena Jolly MD     Review of Systems    Objective   Vitals:  /65   Pulse 68   Temp 36.4 °C (97.5 °F)   Wt 66.7 kg (147 lb)   SpO2 97%   BMI 23.02 kg/m²       Physical Exam  Gen: chronically ill appearance; AAO x 3.   HEENT: NC/AT. Symmetric pupils. TMs normal. MMM.  Neck: no LAD.  CV: RRR nl s1s2 no m/r/g  GI: soft NTND. Urine stoma present.  Pulm: CTAB no w/r/r  MSK: normal bulk, tone  Ext: WWP no edema  Neuro: grossly unremarkable  Gait: unremarkable   Skin: healthy  appearing stoma site    Lab Results   Component Value Date    WBC 9.5 02/23/2023    HGB 10.1 (L) 02/23/2023    HCT 33.6 (L) 02/23/2023     02/23/2023    CHOL 135 12/04/2020    TRIG 127 12/04/2020    HDL 28.6 (A) 12/04/2020    ALT 11 10/08/2021    AST 10 10/08/2021     03/29/2023    K 4.7 03/29/2023     03/29/2023    CREATININE 2.83 (H) 03/29/2023    BUN 55 (H) 03/29/2023    CO2 24 03/29/2023    TSH 7.23 (H) 10/09/2021    PSA <0.10 12/17/2021    INR 0.9 08/04/2021     par       Assessment/Plan   Doing much better than last time I saw him.  Stoma site appears healthy.    # HTN: not at goal  - ambulatory pressures, RTC 4-8 weeks with BP log  - routine blood/urine work, if not recent  - lifestyle modifications discussed at length     # CKD: stage 4  - renally dose meds  - CMP  - lifestyle changes     #  cancer  - follow up urology (Dr. Lawson)  - serial scans    # COPD: stable  - continue inhalers  - referral to pulm if necessary     # Health Maintenance  - routine blood work  - Colon Cancer Screening: no longer indicated due to age   - PSA: via urology  - Immunizations:  declines all  - AAA screening:  declines        Problem List Items Addressed This Visit    None

## 2023-10-25 NOTE — PATIENT INSTRUCTIONS
Please stop at the lab ([address]) to complete your blood and/or urine work that I've ordered for you.    I will contact you with the results at my soonest convenience. I strongly urge you to use [employer] as this is the quickest and easiest way to access your results and receive my correspondences.     I have renewed your chronic medications today     Follow up with your specialists as previously scheduled.     Keep an eye on your blood pressure, as it's not at goal.

## 2023-11-02 ENCOUNTER — TELEMEDICINE (OUTPATIENT)
Dept: UROLOGY | Facility: CLINIC | Age: 78
End: 2023-11-02
Payer: MEDICARE

## 2023-11-02 DIAGNOSIS — C67.9 MALIGNANT NEOPLASM OF URINARY BLADDER, UNSPECIFIED SITE (MULTI): Primary | ICD-10-CM

## 2023-11-02 PROBLEM — R53.83 MALAISE AND FATIGUE: Status: ACTIVE | Noted: 2023-11-02

## 2023-11-02 PROBLEM — F10.21 ALCOHOLISM IN RECOVERY (MULTI): Status: ACTIVE | Noted: 2023-11-02

## 2023-11-02 PROBLEM — N40.1 BPH ASSOCIATED WITH NOCTURIA: Status: ACTIVE | Noted: 2023-11-02

## 2023-11-02 PROBLEM — L30.9 STOMA DERMATITIS: Status: ACTIVE | Noted: 2023-11-02

## 2023-11-02 PROBLEM — C68.9: Status: ACTIVE | Noted: 2023-11-02

## 2023-11-02 PROBLEM — M75.81 TENDINITIS OF RIGHT ROTATOR CUFF: Status: ACTIVE | Noted: 2023-11-02

## 2023-11-02 PROBLEM — M19.042 PRIMARY OSTEOARTHRITIS OF BOTH HANDS: Status: ACTIVE | Noted: 2023-11-02

## 2023-11-02 PROBLEM — M19.041 PRIMARY OSTEOARTHRITIS OF BOTH HANDS: Status: ACTIVE | Noted: 2023-11-02

## 2023-11-02 PROBLEM — M48.062 SPINAL STENOSIS, LUMBAR REGION WITH NEUROGENIC CLAUDICATION: Status: ACTIVE | Noted: 2023-11-02

## 2023-11-02 PROBLEM — K43.5 PARASTOMAL HERNIA: Status: ACTIVE | Noted: 2023-11-02

## 2023-11-02 PROBLEM — G37.3 TRANSVERSE MYELITIS (MULTI): Status: ACTIVE | Noted: 2023-11-02

## 2023-11-02 PROBLEM — F32.4 MAJOR DEPRESSION IN PARTIAL REMISSION (CMS-HCC): Status: ACTIVE | Noted: 2023-11-02

## 2023-11-02 PROBLEM — M75.82 TENDINITIS OF LEFT ROTATOR CUFF: Status: ACTIVE | Noted: 2023-11-02

## 2023-11-02 PROBLEM — D50.9 IRON DEFICIENCY ANEMIA: Status: ACTIVE | Noted: 2023-11-02

## 2023-11-02 PROBLEM — R35.1 BPH ASSOCIATED WITH NOCTURIA: Status: ACTIVE | Noted: 2023-11-02

## 2023-11-02 PROBLEM — R53.81 MALAISE AND FATIGUE: Status: ACTIVE | Noted: 2023-11-02

## 2023-11-02 PROBLEM — R53.1 WEAKNESS: Status: ACTIVE | Noted: 2023-11-02

## 2023-11-02 PROBLEM — M54.16 LUMBAR RADICULOPATHY: Status: ACTIVE | Noted: 2023-11-02

## 2023-11-02 PROBLEM — E46 PROTEIN MALNUTRITION (MULTI): Status: ACTIVE | Noted: 2023-11-02

## 2023-11-02 PROBLEM — E87.5 HYPERKALEMIA: Status: ACTIVE | Noted: 2023-11-02

## 2023-11-02 PROBLEM — I65.23 BILATERAL CAROTID ARTERY STENOSIS: Status: ACTIVE | Noted: 2023-10-25

## 2023-11-02 PROBLEM — M47.812 SPONDYLOSIS OF CERVICAL REGION WITHOUT MYELOPATHY OR RADICULOPATHY: Status: ACTIVE | Noted: 2023-11-02

## 2023-11-02 PROBLEM — M21.372 LEFT FOOT DROP: Status: ACTIVE | Noted: 2023-11-02

## 2023-11-02 PROBLEM — I25.10 CAD IN NATIVE ARTERY: Status: ACTIVE | Noted: 2023-11-02

## 2023-11-02 PROBLEM — E78.2 MIXED HYPERLIPIDEMIA: Status: ACTIVE | Noted: 2023-11-02

## 2023-11-02 PROBLEM — R26.89 BALANCE DISORDER: Status: ACTIVE | Noted: 2023-11-02

## 2023-11-02 PROCEDURE — 99213 OFFICE O/P EST LOW 20 MIN: CPT | Performed by: STUDENT IN AN ORGANIZED HEALTH CARE EDUCATION/TRAINING PROGRAM

## 2023-11-02 RX ORDER — GABAPENTIN 100 MG/1
100 CAPSULE ORAL 3 TIMES DAILY
COMMUNITY
End: 2024-03-05 | Stop reason: ALTCHOICE

## 2023-11-02 RX ORDER — FUROSEMIDE 40 MG/1
40 TABLET ORAL DAILY
COMMUNITY
End: 2024-03-05 | Stop reason: ALTCHOICE

## 2023-11-02 RX ORDER — IRON POLYSACCHARIDE COMPLEX 150 MG
150 CAPSULE ORAL
COMMUNITY
End: 2024-03-05 | Stop reason: ALTCHOICE

## 2023-11-02 NOTE — PROGRESS NOTES
HPI:  Procedure (8/2021): RC+IC   Path: JAQUELINE, GG1 prostate cancer     Procedure (4/2021): TURBT   Path: HgT1     78 year old male with bladder cancer, s/p RC+IC. PSA undetectable (12/17/21). CT CAP wo contrast (3/14/22) with stable emphysematous changes of the lungs with a nonspecific 5 mm ground-glass nodule within the right lower lobe which may represent focal atelectasis, postsurgical changes from cystoprostatectomy with ileal conduit without interval abnormality, previously noted probable extraperitoneal hematoma is markedly decreased in size, new parastomal fat containing hernia. CT CAP wo contrast (9/14/22) with stable subcentimeter pulmonary nodule, largest within the right lower lobe measuring up to 5 mm; no noncontrast evidence of new subdiaphragmatic metastatic disease; hyperdense lesion of the left kidney midpole measuring up to 2.2 cm, indeterminate, most likely representing a Bosniak 2 lesion; similar appearing bilateral renal cysts. Interested in parastomal hernia repair. CT CAP (3/29/23) showed new punctate clustered nodules in the right upper lobe as well as a few new nodules in the left lower lobe, overall the appearance favors an infectious or inflammatory process over metastatic disease, otherwise no convincing findings of new metastatic disease of the thorax, no definite new metastatic disease on noncontrast exam, new symmetric mild dilatation of the ureters without additional findings to indicate ureteral obstruction. Recently underwent hernia repair on (12/27/22). CT CAP (10/23/23) showed no definite evidence of locoregional recurrence or metastatic disease in the chest, abdomen, or pelvis, new nonspecific 4 mm nodular opacity in the left lower lobe. Reports UTI 2mos ago, took abx and symptoms resolved. Reports recent flu-like symptoms, now resolved. His appetite has increased and he has gained weight. He is completing PT. Doing well.      PSA undetectable (12/17/21)  Cre: 2.83 (3/29/23), 2.78  (8/29/22), 2.33 (3/14/22), 1.70 (12/17/21), 1.63 (11/2021)  GFR Non  (12/17/21): 40 mL/min/1.73m2  GFR  (12/17/21): 48 mL/min/1.73m2  GFR Male (3/14/22): 28 mL/min/1.73m2     CT CAP (10/7/21): No acute abnormality. Postsurgical changes from cysto prostatectomy, without evidence of hydronephrosis. The previously seen extraperitoneal fluid collection centered in the prostate fossa has significantly decreased in size and demonstrates slightly greater density than simple fluid, with its major component however still measuring 6.3 x 7.3 x 4.2 cm. There is no associated gas on the current examination. There are no other collections identified. This may reflect an extraperitoneal hematoma, superimposed infection is difficult to exclude.     CT CAP wo contrast (3/14/22): Stable emphysematous changes of the lungs with a nonspecific 5 mm ground-glass nodule within the right lower lobe which may represent focal atelectasis. Please give attention on follow-up examination. Postsurgical changes from cystoprostatectomy with ileal conduit without interval abnormality. Previously noted probable extraperitoneal hematoma is markedly decreased in size. New parastomal fat containing hernia.      CT CAP wo contrast (9/14/22): Stable subcentimeter pulmonary nodule since comparison CT imaging 03/14/2022, largest within the right lower lobe measuring up to 5 mm. Remaining intrathoracic findings appear stable since comparison CT imaging 03/14/2022. Postsurgical changes from cystoprostatectomy as previously seen. No noncontrast evidence of new subdiaphragmatic metastatic disease. Hyperdense lesion of the left kidney midpole measuring up to 2.2 cm, indeterminate, however most likely representing a Bosniak 2 lesion. Additional similar appearing bilateral renal cysts. Remainder findings appear stable since comparison CT imaging 03/14/2022.     CT CAP (3/29/23): new punctate clustered nodules in the right upper  lobe as well as a few new nodules in the left lower lobe, overall the appearance favors an infectious or inflammatory process over metastatic disease, otherwise no convincing findings of new metastatic disease of the thorax, no definite new metastatic disease on noncontrast exam, new symmetric mild dilatation of the ureters without additional findings to indicate ureteral obstruction     CT CAP (10/23/23): no definite evidence of locoregional recurrence or metastatic disease in the chest, abdomen, or pelvis, new nonspecific 4 mm nodular opacity in the left lower lobe, the additional smaller scattered waxing and waning punctate nodules in both lungs which are favored of infectious/inflammatory etiology.    Review of Systems:  All systems reviewed. Anything negative noted in the HPI.    Physical Exam:  Virtual visit.     Assessment/Plan   78 year old male with bladder cancer, s/p RC+IC. PSA undetectable (12/17/21). CT CAP (3/29/23) showed new punctate clustered nodules in the right upper lobe as well as a few new nodules in the left lower lobe, overall the appearance favors an infectious or inflammatory process over metastatic disease, otherwise no convincing findings of new metastatic disease of the thorax, no definite new metastatic disease on noncontrast exam, new symmetric mild dilatation of the ureters without additional findings to indicate ureteral obstruction. Recently underwent hernia repair on (12/27/22). CT CAP (10/23/23) showed no definite evidence of locoregional recurrence or metastatic disease in the chest, abdomen, or pelvis, new nonspecific 4 mm nodular opacity in the left lower lobe. Reports UTI 2mos ago, took abx and symptoms resolved. Doing well. Management options including risks, benefits and alternatives discussed at length and all questions answered. Patient prefers to proceed with follow-up in 1 year with CT CAP and CMP.     Rx abx refill.         By signing my name below, I, Katja Blanco,  Garrick, attest that this documentation has been prepared under the direction and in the presence of Dr. Eugene Lawson.  All medical record entries made by the Scribe were at my direction and personally dictated by me. I have reviewed the chart and agree that the record accurately reflects my personal performance of the history, physical exam, discussion and plan.

## 2023-11-07 ENCOUNTER — OFFICE VISIT (OUTPATIENT)
Dept: PHYSICAL MEDICINE AND REHAB | Facility: CLINIC | Age: 78
End: 2023-11-07
Payer: MEDICARE

## 2023-11-07 VITALS
HEIGHT: 67 IN | HEART RATE: 74 BPM | BODY MASS INDEX: 23.86 KG/M2 | DIASTOLIC BLOOD PRESSURE: 65 MMHG | SYSTOLIC BLOOD PRESSURE: 172 MMHG | OXYGEN SATURATION: 96 % | WEIGHT: 152 LBS | TEMPERATURE: 97.1 F

## 2023-11-07 DIAGNOSIS — M47.812 SPONDYLOSIS OF CERVICAL REGION WITHOUT MYELOPATHY OR RADICULOPATHY: ICD-10-CM

## 2023-11-07 DIAGNOSIS — R53.1 WEAKNESS: ICD-10-CM

## 2023-11-07 DIAGNOSIS — M21.372 LEFT FOOT DROP: Primary | ICD-10-CM

## 2023-11-07 DIAGNOSIS — G37.3 TRANSVERSE MYELITIS (MULTI): ICD-10-CM

## 2023-11-07 DIAGNOSIS — M54.16 LUMBAR RADICULOPATHY: ICD-10-CM

## 2023-11-07 DIAGNOSIS — M48.062 SPINAL STENOSIS, LUMBAR REGION WITH NEUROGENIC CLAUDICATION: ICD-10-CM

## 2023-11-07 DIAGNOSIS — R26.89 BALANCE DISORDER: ICD-10-CM

## 2023-11-07 PROCEDURE — 99213 OFFICE O/P EST LOW 20 MIN: CPT | Performed by: PHYSICAL MEDICINE & REHABILITATION

## 2023-11-07 PROCEDURE — 1036F TOBACCO NON-USER: CPT | Performed by: PHYSICAL MEDICINE & REHABILITATION

## 2023-11-07 PROCEDURE — 3077F SYST BP >= 140 MM HG: CPT | Performed by: PHYSICAL MEDICINE & REHABILITATION

## 2023-11-07 PROCEDURE — 3078F DIAST BP <80 MM HG: CPT | Performed by: PHYSICAL MEDICINE & REHABILITATION

## 2023-11-07 PROCEDURE — 1160F RVW MEDS BY RX/DR IN RCRD: CPT | Performed by: PHYSICAL MEDICINE & REHABILITATION

## 2023-11-07 PROCEDURE — 1159F MED LIST DOCD IN RCRD: CPT | Performed by: PHYSICAL MEDICINE & REHABILITATION

## 2023-11-07 PROCEDURE — 1125F AMNT PAIN NOTED PAIN PRSNT: CPT | Performed by: PHYSICAL MEDICINE & REHABILITATION

## 2023-11-07 ASSESSMENT — PAIN SCALES - GENERAL: PAINLEVEL: 5

## 2023-11-07 NOTE — PATIENT INSTRUCTIONS
- If you decide that you want to, start gabapentin 100 mg at night for a few days, once you feel fine then go up to 200 mg, and then eventually to 300 mg at night for now.  -Consider other medications in the future  -Continue physical therapy and daily home exercises, this should be dynamic and focusing on active strengthening.  -Consider injections in the future: Trigger point injections (handout provided previously), ultrasound-guided SI joint injections, referral for spine injections  -Consider EMG in the future  -Consider referral to surgical spine in the future  -Follow-up 6 months or sooner if needed, red flags discussed

## 2023-11-07 NOTE — PROGRESS NOTES
Provider Impressions     This is a pleasant 78 year old right handed male with PMH of bladder cancer, hypothyroidism, A- fib, GERD, HTN, CAD, Hepatitis C, depression, CKD3 who presents for follow-up of chronic low for back pain. Physical exam is positive for weakness in left foot ankle evertors dorsiflexion and mild hyperreflexia. Symptoms and physical exam findings in this complex patient are of unclear etiology at this time, but differential includes L5 radiculopathy lumbar and/or cervical spine stenosis, and given history of transverse myelitis and bladder cancer, syrinx and metastases respectively are on the differential. Isolated peroneal neuropathy is also on the differential.        - If you decide that you want to, start gabapentin 100 mg at night for a few days, once you feel fine then go up to 200 mg, and then eventually to 300 mg at night for now.  -Consider other medications in the future  -Continue physical therapy and daily home exercises, this should be dynamic and focusing on active strengthening.  -Consider injections in the future: Trigger point injections (handout provided previously), ultrasound-guided SI joint injections, referral for spine injections  -Consider EMG in the future  -Consider referral to surgical spine in the future  -Follow-up 6 months or sooner if needed, red flags discussed        The patient expressed understanding and agreement with the assessment and plan. Patient encouraged to contact us should they have any questions, concerns, or any changes in symptoms.        Thank you for allowing me to participate in the care of your patient.     ** Dictated with voice recognition software, please forgive any errors in grammar and/or spelling **      Chief Complaint     Lower back and bilateral leg pain.      History of Present Illness    This is a pleasant 78 year old right handed male with PMH of bladder cancer (in remission treated with bladder resection), hypothyroidism, A-  fib, GERD, HTN, CAD, Hepatitis C, depression, CKD3 who presents for follow-up of chronic back pain.      He was last seen here on 9/1/23, at which point I reviewed axial spine MRIs.    I advised him to consider starting gabapentin and I referred him to physical therapy.  Physical therapy actually helped significantly, and he did not feel the need to start gabapentin.  He rates his pain as a 5/10 today and feels that he is managing the pain well and does not feel the need for any further intervention at this point..    Red flags discussed with him.  He denies all of them today aside from the known left foot weakness.      He rates his pain as 5/10, previously 6/10.      Otherwise, there have been no changes to his medications or past medical history since last visit     ______________________________  9/1/2023: MRIs reviewed, try gabapentin if you want, consider injections, physical therapy referral provided, consider referral to surgery   11/7/23: Continue physical therapy, home exercises, consider gabapentin other medications, consider referral to surgery, follow-up 6 months  _____________________________  As a reminder:     TIMELINE OF COMPLAINT(S):  Pain started over 30 years ago, maybe a couple of months after a fall in the [place] while working [place]. Started in low back. Getting worse in frequency. Standing up with arms forward makes it worse. Sitting makes better. Occasionally goes down posterior legs to ankles, switches between sides. About 3 times/ week. Pain in back is constant. Low back feels throbbing. Left side is worse. Was told he has neuropathy in feet. Feels pain shooting down when fatigued.      Did have a fall about 2 months ago into shower, was sick for a few weeks and didn't feel good and passed out , landed on both anterior knees, passed out 10 min, - has had problem with left foot slapping ground since then. Pain not worse since fall in the shower. Falling or almost falling more lately.      Did have transverse myelitis in his 20s- was paralyzed waist down for 9 months. Was at T10. Recovered completely except some sensation changes from waist down.      Needs stool softeners daily since bladder removal 2 years ago. no incontinence, no saddle anesthesia.      Pain:  LOCATION- Lower back and bilateral leg  RADIATION-  ASSOCIATED WITH- Swelling  NUMBNESS/TINGLING- Yes, fingers  WEAKNESS- Yes  CONSTANT or INTERMITTENT- Intermittent  SEVERITY/QUANTITY- 5  QUALITY- Achy, throbbing  EXACERBATED BY- Bending over  BETTER WITH- Sitting down  TRIED- Acetaminophen- does help, Oxycodone- does not take that, medical marijuana    Anti-Inflammatories: (has CKD) Aleve- cannot take NSAIDS anymore   Muscle relaxants:   Anti-depressants:    Neuroleptics: previously Gabapentin- doesn't remember but thinks something was wrong    LDN:        PHYSICAL THERAPY: Has not had any for back- referred in April?  TENS unit: Yes- helps temporarily   CHIROPRACTIC MANIPULATION:Yes- has not been in >10 years   ACUPUNCTURE TREATMENTS: Yes- helped at the time   DEEP TISSUE MASSAGE THERAPY: No  OSTEOPATHIC MANIPULATION THERAPY: No     INJECTIONS: Yes  - Had nerve blocks done in back ( now retired)- helped for a few days     EMG/NCS: Yes, lower  Through [place]- doesn't remember what it showed , many years ago     IMAGING: No lumbar images     MRI Cervical and Thoracic Spine 2/19/21: Cervical spondylosis, worse as compared to prior MRI from 2009. Multilevel thoracic degenerative changes most significantly causing mild- moderate central canal stenosis at T10-11.      I ordered lumbar, thoracic, and cervical MRIs and these were done on 8/24/2023. Lumbar MRI showed multilevel spondylosis with severe bilateral foraminal stenosis at L5-S1 due to a central and right paracentral herniated nucleus pulposus, severe bilateral foraminal narrowing at L4-5 and L3-4. No severe central canal stenosis. Thoracic MRI showed multilevel spondylosis without  any severe canal or foraminal narrowing. Unchanged from previous MRI. There is fusion of the vertebral bodies with normal alignment. Cervical MRI showed multilevel cervical spondylosis without significant central canal or foraminal narrowing. No cord compression.    CT C/A/P 3/29/23: No definitive new metastatic disease. No focal suspicious skeletal lesions. Degenerative changes of the spine.      CT Cervical Spine 9/18/18: There is mild narrowing of C2-3 disc space. Prominent narrowing of C 4-5, C5-6, and C6-7 disc spaces noted. Grade 1 anterolisthesis of C3 and C4 is seen. Uncovertebral joint hypertrophy is noted. Disc bulge at C3-4 level. Small disc bulges seen at C5-6.         FUNCTIONAL HISTORY: The patient is independent in all ADLs, mobility, and driving. The patient does not use any assistive device.     SH:  Lives in: [place]  Lives with: Wife  Occupation: Maintenance  Tobacco: No  Alcohol: No  Drugs: Medical Marijuana        _______________________  ROS: The patient denies any bowel or bladder incontinence/accidents, night sweats, fevers, chills, recent significant weight loss. A 14 point review of systems was reviewed with the patient and is as above and otherwise negative.  ROS Questionnaire positive for back pain      Physical Exam  GEN - Alert, well-developed, thin, no acute distress  PSYCH - Cooperative, appropriate mood and affect  HEENT - NC/AT  RESP - Non-labored respirations, equal expansion  CV - warm and well-perfused, No cyanosis, there was mild edema in extremities.   ABD- soft, ND  SKIN - No visible rash.     Previous  BACK/SPINE - Symmetric posture, no erythema, edema, or swelling. Mild bilateral low back pain with lumbar flexion, extension, rotation, and side bend. No pain with facet loading. No tenderness of lumbosacral spinous processes. Mild tenderness over the bilateral lumbar paraspinal muscles. No tenderness over the iliolumbar ligaments bilaterally. No TTP of bilateral PSIS, sacral  sulcus. Some tenderness over gluteus medius, piriformis. Straight leg raise negative bilaterally. Sitting slump test negative bilaterally. Femoral stretch test negative bilaterally.        HIPS/PELVIS - Symmetric in standing and lying Passive hip flexion, internal rotation, and external rotation within functional normal limits bilaterally with provocation of low back pain symptoms. No tenderness over iliopsoas tendon. Positive FABERs bilaterally. Negative hip clicking Negative resisted active SLR. Back pain with deep hip flexion        NEURO -  Weakness dorsiflex left foot, 3/5, 1/5 ankle eversion, EHL, 5/5 ankle plantarflexion, inversion,  5-/5 knee flexion left   5 -/5 bilateral hip flexion  5-/5 right ankle dorsiflexion  5 -/5 ADM and FDI on the right  All other muscles 5/5  Somewhat brisk reflexes more so on the right, diminished Achilles reflexes bilaterally. Normal upper extremity reflexes, negative Ilir's  Sensation intact to light touch bilateral lower extremities  GAIT - Normal base, normal stride length, hunched over, some foot slapping on left side

## 2023-11-20 DIAGNOSIS — J44.9 CHRONIC OBSTRUCTIVE PULMONARY DISEASE, UNSPECIFIED COPD TYPE (MULTI): Primary | ICD-10-CM

## 2023-11-20 RX ORDER — TIOTROPIUM BROMIDE 18 UG/1
1 CAPSULE ORAL; RESPIRATORY (INHALATION)
Qty: 90 CAPSULE | Refills: 1 | Status: SHIPPED | OUTPATIENT
Start: 2023-11-20 | End: 2023-11-28 | Stop reason: SDUPTHER

## 2023-11-22 ENCOUNTER — TELEPHONE (OUTPATIENT)
Dept: PRIMARY CARE | Facility: CLINIC | Age: 78
End: 2023-11-22

## 2023-11-22 NOTE — TELEPHONE ENCOUNTER
Patient wife called about a new prescription needing to be sent to the [facility] assistance program    FAX prescription to number [phone]

## 2023-11-28 DIAGNOSIS — J44.9 CHRONIC OBSTRUCTIVE PULMONARY DISEASE, UNSPECIFIED COPD TYPE (MULTI): ICD-10-CM

## 2023-11-28 RX ORDER — TIOTROPIUM BROMIDE 18 UG/1
1 CAPSULE ORAL; RESPIRATORY (INHALATION)
Qty: 90 CAPSULE | Refills: 1 | Status: SHIPPED | OUTPATIENT
Start: 2023-11-28 | End: 2023-11-29 | Stop reason: SDUPTHER

## 2023-11-29 DIAGNOSIS — J44.9 CHRONIC OBSTRUCTIVE PULMONARY DISEASE, UNSPECIFIED COPD TYPE (MULTI): ICD-10-CM

## 2023-11-29 RX ORDER — TIOTROPIUM BROMIDE 18 UG/1
1 CAPSULE ORAL; RESPIRATORY (INHALATION)
Qty: 90 CAPSULE | Refills: 1 | Status: SHIPPED | OUTPATIENT
Start: 2023-11-29

## 2024-01-29 DIAGNOSIS — C67.9 MALIGNANT NEOPLASM OF URINARY BLADDER, UNSPECIFIED SITE (MULTI): Primary | ICD-10-CM

## 2024-01-29 RX ORDER — AMOXICILLIN AND CLAVULANATE POTASSIUM 875; 125 MG/1; MG/1
1 TABLET, FILM COATED ORAL 2 TIMES DAILY
Qty: 28 TABLET | Refills: 4 | Status: SHIPPED | OUTPATIENT
Start: 2024-01-29 | End: 2024-02-12

## 2024-02-20 ENCOUNTER — TELEPHONE (OUTPATIENT)
Dept: PRIMARY CARE | Facility: CLINIC | Age: 79
End: 2024-02-20
Payer: MEDICARE

## 2024-02-29 PROBLEM — R35.1 NOCTURIA ASSOCIATED WITH BENIGN PROSTATIC HYPERPLASIA: Status: ACTIVE | Noted: 2023-11-02

## 2024-02-29 PROBLEM — R11.0 NAUSEA: Status: ACTIVE | Noted: 2024-02-29

## 2024-02-29 PROBLEM — S37.009A INJURY OF KIDNEY: Status: ACTIVE | Noted: 2022-12-31

## 2024-02-29 PROBLEM — L98.9 INFLAMMATORY DERMATOSIS: Status: ACTIVE | Noted: 2022-06-24

## 2024-02-29 PROBLEM — N40.1 NOCTURIA ASSOCIATED WITH BENIGN PROSTATIC HYPERPLASIA: Status: ACTIVE | Noted: 2023-11-02

## 2024-02-29 PROBLEM — I25.10 ARTERIOSCLEROSIS OF CORONARY ARTERY: Status: ACTIVE | Noted: 2022-12-31

## 2024-02-29 PROBLEM — M47.812 SPONDYLOSIS OF CERVICAL SPINE WITHOUT MYELOPATHY: Status: ACTIVE | Noted: 2023-08-24

## 2024-02-29 PROBLEM — R79.9 BLOOD UREA ABNORMAL: Status: ACTIVE | Noted: 2023-03-29

## 2024-02-29 PROBLEM — N39.0 RECURRENT URINARY TRACT INFECTION: Status: ACTIVE | Noted: 2024-02-29

## 2024-02-29 PROBLEM — L03.90 CELLULITIS: Status: ACTIVE | Noted: 2024-02-29

## 2024-02-29 PROBLEM — N13.30 HYDRONEPHROSIS: Status: ACTIVE | Noted: 2022-12-31

## 2024-02-29 PROBLEM — R26.89 IMPAIRMENT OF BALANCE: Status: ACTIVE | Noted: 2023-11-02

## 2024-02-29 PROBLEM — I25.2 HISTORY OF MYOCARDIAL INFARCTION: Status: ACTIVE | Noted: 2022-12-15

## 2024-02-29 PROBLEM — R32 URINARY INCONTINENCE: Status: ACTIVE | Noted: 2022-03-24

## 2024-02-29 PROBLEM — M54.50 LOW BACK PAIN, UNSPECIFIED: Status: ACTIVE | Noted: 2023-08-24

## 2024-02-29 PROBLEM — K91.89 POSTOPERATIVE ILEUS (MULTI): Status: ACTIVE | Noted: 2024-02-29

## 2024-02-29 PROBLEM — I73.9 PERIPHERAL VASCULAR DISEASE (CMS-HCC): Status: ACTIVE | Noted: 2024-02-29

## 2024-02-29 PROBLEM — R65.21 SEPTIC SHOCK (MULTI): Status: ACTIVE | Noted: 2024-02-29

## 2024-02-29 PROBLEM — D49.4 NEOPLASM OF BLADDER: Status: ACTIVE | Noted: 2024-02-29

## 2024-02-29 PROBLEM — M54.50 LOW BACK PAIN: Status: ACTIVE | Noted: 2023-08-24

## 2024-02-29 PROBLEM — J41.0 SIMPLE CHRONIC BRONCHITIS (MULTI): Status: ACTIVE | Noted: 2022-12-15

## 2024-02-29 PROBLEM — C80.1 CARCINOMA (MULTI): Status: ACTIVE | Noted: 2022-09-14

## 2024-02-29 PROBLEM — E63.9 DEFICIENCY OF MACRONUTRIENTS: Status: ACTIVE | Noted: 2019-10-24

## 2024-02-29 PROBLEM — R10.9 FLANK PAIN: Status: ACTIVE | Noted: 2024-02-29

## 2024-02-29 PROBLEM — K56.7 POSTOPERATIVE ILEUS (MULTI): Status: ACTIVE | Noted: 2024-02-29

## 2024-02-29 PROBLEM — D64.9 ANEMIA: Status: ACTIVE | Noted: 2024-02-29

## 2024-02-29 PROBLEM — M19.049 OSTEOARTHRITIS OF HAND: Status: ACTIVE | Noted: 2022-12-15

## 2024-02-29 PROBLEM — G89.18 ACUTE POSTOPERATIVE PAIN: Status: ACTIVE | Noted: 2024-02-29

## 2024-02-29 PROBLEM — N17.9 ACUTE RENAL FAILURE (CMS-HCC): Status: ACTIVE | Noted: 2024-02-29

## 2024-02-29 PROBLEM — R33.9 RETENTION OF URINE: Status: ACTIVE | Noted: 2024-02-29

## 2024-02-29 PROBLEM — A41.9 SEPTIC SHOCK (MULTI): Status: ACTIVE | Noted: 2024-02-29

## 2024-02-29 PROBLEM — M21.379 FOOT DROP: Status: ACTIVE | Noted: 2023-11-02

## 2024-02-29 PROBLEM — F32.A DEPRESSIVE DISORDER: Status: ACTIVE | Noted: 2023-11-02

## 2024-03-05 ENCOUNTER — TELEPHONE (OUTPATIENT)
Dept: PRIMARY CARE | Facility: CLINIC | Age: 79
End: 2024-03-05

## 2024-03-05 ENCOUNTER — OFFICE VISIT (OUTPATIENT)
Dept: PRIMARY CARE | Facility: CLINIC | Age: 79
End: 2024-03-05
Payer: MEDICARE

## 2024-03-05 VITALS
TEMPERATURE: 98.4 F | BODY MASS INDEX: 24.86 KG/M2 | WEIGHT: 158.4 LBS | SYSTOLIC BLOOD PRESSURE: 138 MMHG | DIASTOLIC BLOOD PRESSURE: 74 MMHG | HEART RATE: 65 BPM | HEIGHT: 67 IN

## 2024-03-05 DIAGNOSIS — B18.2 CHRONIC HEPATITIS C WITHOUT HEPATIC COMA (MULTI): ICD-10-CM

## 2024-03-05 DIAGNOSIS — E03.9 ACQUIRED HYPOTHYROIDISM: Primary | ICD-10-CM

## 2024-03-05 DIAGNOSIS — G37.3 TRANSVERSE MYELITIS (MULTI): ICD-10-CM

## 2024-03-05 DIAGNOSIS — E46 PROTEIN MALNUTRITION (MULTI): ICD-10-CM

## 2024-03-05 DIAGNOSIS — E55.9 VITAMIN D DEFICIENCY: ICD-10-CM

## 2024-03-05 DIAGNOSIS — N18.4 CKD (CHRONIC KIDNEY DISEASE) STAGE 4, GFR 15-29 ML/MIN (MULTI): ICD-10-CM

## 2024-03-05 DIAGNOSIS — C67.9 MALIGNANT NEOPLASM OF URINARY BLADDER, UNSPECIFIED SITE (MULTI): ICD-10-CM

## 2024-03-05 DIAGNOSIS — F32.4 MAJOR DEPRESSIVE DISORDER IN PARTIAL REMISSION, UNSPECIFIED WHETHER RECURRENT (CMS-HCC): ICD-10-CM

## 2024-03-05 DIAGNOSIS — J44.9 CHRONIC OBSTRUCTIVE PULMONARY DISEASE, UNSPECIFIED COPD TYPE (MULTI): ICD-10-CM

## 2024-03-05 DIAGNOSIS — I73.9 PERIPHERAL VASCULAR DISEASE, UNSPECIFIED (CMS-HCC): ICD-10-CM

## 2024-03-05 DIAGNOSIS — K21.9 GASTROESOPHAGEAL REFLUX DISEASE WITHOUT ESOPHAGITIS: ICD-10-CM

## 2024-03-05 DIAGNOSIS — I48.0 PAROXYSMAL ATRIAL FIBRILLATION (MULTI): ICD-10-CM

## 2024-03-05 LAB
ALBUMIN SERPL BCP-MCNC: 4.4 G/DL (ref 3.4–5)
ALP SERPL-CCNC: 37 U/L (ref 33–136)
ALT SERPL W P-5'-P-CCNC: 13 U/L (ref 10–52)
ANION GAP SERPL CALC-SCNC: 18 MMOL/L (ref 10–20)
AST SERPL W P-5'-P-CCNC: 19 U/L (ref 9–39)
BASOPHILS # BLD AUTO: 0.02 X10*3/UL (ref 0–0.1)
BASOPHILS NFR BLD AUTO: 0.3 %
BILIRUB SERPL-MCNC: 0.4 MG/DL (ref 0–1.2)
BUN SERPL-MCNC: 78 MG/DL (ref 6–23)
CALCIUM SERPL-MCNC: 8.9 MG/DL (ref 8.6–10.3)
CHLORIDE SERPL-SCNC: 109 MMOL/L (ref 98–107)
CO2 SERPL-SCNC: 20 MMOL/L (ref 21–32)
CREAT SERPL-MCNC: 3.43 MG/DL (ref 0.5–1.3)
EGFRCR SERPLBLD CKD-EPI 2021: 18 ML/MIN/1.73M*2
EOSINOPHIL # BLD AUTO: 0.33 X10*3/UL (ref 0–0.4)
EOSINOPHIL NFR BLD AUTO: 5.5 %
ERYTHROCYTE [DISTWIDTH] IN BLOOD BY AUTOMATED COUNT: 17.5 % (ref 11.5–14.5)
GLUCOSE SERPL-MCNC: 77 MG/DL (ref 74–99)
HCT VFR BLD AUTO: 32.8 % (ref 41–52)
HGB BLD-MCNC: 10 G/DL (ref 13.5–17.5)
IMM GRANULOCYTES # BLD AUTO: 0.01 X10*3/UL (ref 0–0.5)
IMM GRANULOCYTES NFR BLD AUTO: 0.2 % (ref 0–0.9)
LYMPHOCYTES # BLD AUTO: 1.03 X10*3/UL (ref 0.8–3)
LYMPHOCYTES NFR BLD AUTO: 17 %
MCH RBC QN AUTO: 27.7 PG (ref 26–34)
MCHC RBC AUTO-ENTMCNC: 30.5 G/DL (ref 32–36)
MCV RBC AUTO: 91 FL (ref 80–100)
MONOCYTES # BLD AUTO: 0.36 X10*3/UL (ref 0.05–0.8)
MONOCYTES NFR BLD AUTO: 6 %
NEUTROPHILS # BLD AUTO: 4.3 X10*3/UL (ref 1.6–5.5)
NEUTROPHILS NFR BLD AUTO: 71 %
NRBC BLD-RTO: 0 /100 WBCS (ref 0–0)
PLATELET # BLD AUTO: 283 X10*3/UL (ref 150–450)
POTASSIUM SERPL-SCNC: 6.9 MMOL/L (ref 3.5–5.3)
PROT SERPL-MCNC: 7.4 G/DL (ref 6.4–8.2)
RBC # BLD AUTO: 3.61 X10*6/UL (ref 4.5–5.9)
SODIUM SERPL-SCNC: 140 MMOL/L (ref 136–145)
TSH SERPL-ACNC: 3.65 MIU/L (ref 0.44–3.98)
WBC # BLD AUTO: 6.1 X10*3/UL (ref 4.4–11.3)

## 2024-03-05 PROCEDURE — 3078F DIAST BP <80 MM HG: CPT | Performed by: NURSE PRACTITIONER

## 2024-03-05 PROCEDURE — 3075F SYST BP GE 130 - 139MM HG: CPT | Performed by: NURSE PRACTITIONER

## 2024-03-05 PROCEDURE — 84443 ASSAY THYROID STIM HORMONE: CPT

## 2024-03-05 PROCEDURE — 1159F MED LIST DOCD IN RCRD: CPT | Performed by: NURSE PRACTITIONER

## 2024-03-05 PROCEDURE — 80053 COMPREHEN METABOLIC PANEL: CPT

## 2024-03-05 PROCEDURE — 85025 COMPLETE CBC W/AUTO DIFF WBC: CPT

## 2024-03-05 PROCEDURE — 1157F ADVNC CARE PLAN IN RCRD: CPT | Performed by: NURSE PRACTITIONER

## 2024-03-05 PROCEDURE — 99214 OFFICE O/P EST MOD 30 MIN: CPT | Performed by: NURSE PRACTITIONER

## 2024-03-05 PROCEDURE — 36415 COLL VENOUS BLD VENIPUNCTURE: CPT

## 2024-03-05 PROCEDURE — 1036F TOBACCO NON-USER: CPT | Performed by: NURSE PRACTITIONER

## 2024-03-05 PROCEDURE — 1160F RVW MEDS BY RX/DR IN RCRD: CPT | Performed by: NURSE PRACTITIONER

## 2024-03-05 PROCEDURE — 82306 VITAMIN D 25 HYDROXY: CPT

## 2024-03-05 PROCEDURE — 1125F AMNT PAIN NOTED PAIN PRSNT: CPT | Performed by: NURSE PRACTITIONER

## 2024-03-05 RX ORDER — BISMUTH SUBSALICYLATE 262 MG
1 TABLET,CHEWABLE ORAL DAILY
COMMUNITY

## 2024-03-05 RX ORDER — PYRIDOXINE HCL (VITAMIN B6) 100 MG
100 TABLET ORAL DAILY
COMMUNITY

## 2024-03-05 ASSESSMENT — PATIENT HEALTH QUESTIONNAIRE - PHQ9
10. IF YOU CHECKED OFF ANY PROBLEMS, HOW DIFFICULT HAVE THESE PROBLEMS MADE IT FOR YOU TO DO YOUR WORK, TAKE CARE OF THINGS AT HOME, OR GET ALONG WITH OTHER PEOPLE: SOMEWHAT DIFFICULT
SUM OF ALL RESPONSES TO PHQ QUESTIONS 1-9: 16
3. TROUBLE FALLING OR STAYING ASLEEP OR SLEEPING TOO MUCH: NEARLY EVERY DAY
9. THOUGHTS THAT YOU WOULD BE BETTER OFF DEAD, OR OF HURTING YOURSELF: NOT AT ALL
7. TROUBLE CONCENTRATING ON THINGS, SUCH AS READING THE NEWSPAPER OR WATCHING TELEVISION: SEVERAL DAYS
SUM OF ALL RESPONSES TO PHQ9 QUESTIONS 1 AND 2: 3
1. LITTLE INTEREST OR PLEASURE IN DOING THINGS: NOT AT ALL
4. FEELING TIRED OR HAVING LITTLE ENERGY: NEARLY EVERY DAY
5. POOR APPETITE OR OVEREATING: NEARLY EVERY DAY
8. MOVING OR SPEAKING SO SLOWLY THAT OTHER PEOPLE COULD HAVE NOTICED. OR THE OPPOSITE, BEING SO FIGETY OR RESTLESS THAT YOU HAVE BEEN MOVING AROUND A LOT MORE THAN USUAL: NOT AT ALL
2. FEELING DOWN, DEPRESSED OR HOPELESS: NEARLY EVERY DAY
6. FEELING BAD ABOUT YOURSELF - OR THAT YOU ARE A FAILURE OR HAVE LET YOURSELF OR YOUR FAMILY DOWN: NEARLY EVERY DAY

## 2024-03-05 ASSESSMENT — ENCOUNTER SYMPTOMS
RESPIRATORY NEGATIVE: 1
HEMATURIA: 0
APPETITE CHANGE: 0
EYE DISCHARGE: 0
PALPITATIONS: 0
DIFFICULTY URINATING: 0
COUGH: 0
ALLERGIC/IMMUNOLOGIC NEGATIVE: 1
BRUISES/BLEEDS EASILY: 0
CARDIOVASCULAR NEGATIVE: 1
DYSURIA: 0
ADENOPATHY: 0
TREMORS: 0
WHEEZING: 0
EYES NEGATIVE: 1
ABDOMINAL PAIN: 0
DIARRHEA: 0
NEUROLOGICAL NEGATIVE: 1
NERVOUS/ANXIOUS: 0
ACTIVITY CHANGE: 0
ENDOCRINE NEGATIVE: 1
LOSS OF SENSATION IN FEET: 0
AGITATION: 0
HEADACHES: 0
LIGHT-HEADEDNESS: 0
GASTROINTESTINAL NEGATIVE: 1
SORE THROAT: 0
ARTHRALGIAS: 0
EYE REDNESS: 0
BLOOD IN STOOL: 0
CONSTIPATION: 0
OCCASIONAL FEELINGS OF UNSTEADINESS: 0
FREQUENCY: 0
JOINT SWELLING: 0
SHORTNESS OF BREATH: 0
DIZZINESS: 0
CHEST TIGHTNESS: 0
DEPRESSION: 0

## 2024-03-05 ASSESSMENT — LIFESTYLE VARIABLES
HOW MANY STANDARD DRINKS CONTAINING ALCOHOL DO YOU HAVE ON A TYPICAL DAY: PATIENT DOES NOT DRINK
HOW OFTEN DO YOU HAVE A DRINK CONTAINING ALCOHOL: NEVER

## 2024-03-05 ASSESSMENT — PAIN SCALES - GENERAL: PAINLEVEL: 6

## 2024-03-05 NOTE — PROGRESS NOTES
"Subjective   Patient ID: Devin Fung Jr. is a 79 y.o. male who presents for Establish Care (He had bladder cancer,his bladder was removed in 2021. His urologist is Dr Lawson. He states that he gets frequent UTI's every couple months.).    Presents today to establish care.  His previous provider is Dr. No and he would like to have a provider closer to his home.  He has a history of bladder cancer.  He had his bladder removed in 2021.  His urologist is Dr. Lawson.  He follows with Dr. Henderson in physical medicine.  He has a history of chronic hepatitis C infection GERD, depression, malnutrition, hypothyroidism and atrial fibrillation.  Does not currently follow with cardiology.  He is due for lab work which we will obtain today.         Review of Systems   Constitutional:  Negative for activity change and appetite change.   HENT:  Negative for congestion, ear pain, hearing loss and sore throat.    Eyes: Negative.  Negative for discharge, redness and visual disturbance.   Respiratory: Negative.  Negative for cough, chest tightness, shortness of breath and wheezing.    Cardiovascular: Negative.  Negative for chest pain, palpitations and leg swelling.   Gastrointestinal: Negative.  Negative for abdominal pain, blood in stool, constipation and diarrhea.   Endocrine: Negative.    Genitourinary: Negative.  Negative for difficulty urinating, dysuria, frequency, hematuria, penile discharge and testicular pain.   Musculoskeletal:  Negative for arthralgias and joint swelling.   Skin:  Negative for rash.   Allergic/Immunologic: Negative.    Neurological: Negative.  Negative for dizziness, tremors, light-headedness and headaches.   Hematological:  Negative for adenopathy. Does not bruise/bleed easily.   Psychiatric/Behavioral:  Negative for agitation. The patient is not nervous/anxious.        Objective   /74   Pulse 65   Temp 36.9 °C (98.4 °F) (Temporal)   Ht 1.702 m (5' 7\")   Wt 71.8 kg (158 lb 6.4 oz)   BMI " 24.81 kg/m²     Physical Exam  Vitals reviewed.   Constitutional:       General: He is not in acute distress.     Appearance: Normal appearance.   HENT:      Head: Normocephalic.      Right Ear: Tympanic membrane normal.      Left Ear: Tympanic membrane normal.      Nose: Nose normal.      Mouth/Throat:      Mouth: Mucous membranes are moist.   Eyes:      Conjunctiva/sclera: Conjunctivae normal.      Pupils: Pupils are equal, round, and reactive to light.   Cardiovascular:      Rate and Rhythm: Normal rate and regular rhythm.      Pulses: Normal pulses.      Heart sounds: Normal heart sounds.   Pulmonary:      Effort: Pulmonary effort is normal.      Breath sounds: Normal breath sounds.   Abdominal:      General: Bowel sounds are normal.      Palpations: Abdomen is soft.   Musculoskeletal:         General: Normal range of motion.   Skin:     General: Skin is warm and dry.      Capillary Refill: Capillary refill takes less than 2 seconds.   Neurological:      Mental Status: He is alert and oriented to person, place, and time.   Psychiatric:         Mood and Affect: Mood normal.         Speech: Speech normal.         Assessment/Plan   Problem List Items Addressed This Visit             ICD-10-CM    Acquired hypothyroidism - Primary E03.9    Relevant Orders    TSH with reflex to Free T4 if abnormal    Follow Up In Primary Care - Established    Atrial fibrillation (CMS/HCC) I48.91    Bladder cancer (CMS/HCC) C67.9    Relevant Orders    CBC and Auto Differential    Comprehensive Metabolic Panel    Follow Up In Primary Care - Established    Chronic hepatitis C virus infection (CMS/HCC) B18.2    Gastroesophageal reflux disease without esophagitis K21.9    Relevant Orders    CBC and Auto Differential    Comprehensive Metabolic Panel    Follow Up In Primary Care - Established    Chronic obstructive pulmonary disease, unspecified COPD type (CMS/HCC) J44.9    Relevant Orders    Follow Up In Primary Care - Established    Major  depression in partial remission (CMS/[facility]) F32.4    Protein malnutrition (CMS/HCC) E46    Transverse myelitis (CMS/[facility]) G37.3     Other Visit Diagnoses         Codes    Vitamin D deficiency     E55.9    Relevant Orders    Vitamin D 25-Hydroxy,Total (for eval of Vitamin D levels)    CKD (chronic kidney disease) stage 4, GFR 15-29 ml/min (CMS/[facility])     N18.4    Peripheral vascular disease, unspecified (CMS/[facility])     I73.9        Focus on healthy eating including lean proteins and vegetables.  Avoid high carbohydrate high sugar foods and drinks.  Try to increase physical activity as tolerated.  Goal is for 160 minutes/week of physical activity.  Follow up with Dr. Lawson as directed  Consider reestablishing  with nephrology  Can use claritin or allegra as needed for nasal drainage  Flonase daily using technique described during visit  Blood work obtained today. Will address results when available

## 2024-03-05 NOTE — PATIENT INSTRUCTIONS
Focus on healthy eating including lean proteins and vegetables.  Avoid high carbohydrate high sugar foods and drinks.  Try to increase physical activity as tolerated.  Goal is for 160 minutes/week of physical activity.  Follow up with Dr. Lawson as directed  Consider reestablishing  with nephrology  Can use claritin or allegra as needed for nasal drainage  Flonase daily using technique described during visit  Blood work obtained today. Will address results when available

## 2024-03-06 ENCOUNTER — APPOINTMENT (OUTPATIENT)
Dept: CARDIOLOGY | Facility: HOSPITAL | Age: 79
End: 2024-03-06
Payer: MEDICARE

## 2024-03-06 ENCOUNTER — HOSPITAL ENCOUNTER (EMERGENCY)
Facility: HOSPITAL | Age: 79
Discharge: HOME | End: 2024-03-06
Attending: STUDENT IN AN ORGANIZED HEALTH CARE EDUCATION/TRAINING PROGRAM
Payer: MEDICARE

## 2024-03-06 VITALS
RESPIRATION RATE: 18 BRPM | BODY MASS INDEX: 24.8 KG/M2 | SYSTOLIC BLOOD PRESSURE: 160 MMHG | HEIGHT: 67 IN | HEART RATE: 60 BPM | OXYGEN SATURATION: 95 % | DIASTOLIC BLOOD PRESSURE: 62 MMHG | TEMPERATURE: 97.9 F | WEIGHT: 158 LBS

## 2024-03-06 DIAGNOSIS — N28.9 RENAL INSUFFICIENCY: ICD-10-CM

## 2024-03-06 DIAGNOSIS — E87.5 HYPERKALEMIA: Primary | ICD-10-CM

## 2024-03-06 LAB
25(OH)D3 SERPL-MCNC: 33 NG/ML (ref 30–100)
ALBUMIN SERPL BCP-MCNC: 4.2 G/DL (ref 3.4–5)
ALP SERPL-CCNC: 35 U/L (ref 33–136)
ALT SERPL W P-5'-P-CCNC: 13 U/L (ref 10–52)
ANION GAP SERPL CALC-SCNC: 17 MMOL/L (ref 10–20)
APPEARANCE UR: ABNORMAL
AST SERPL W P-5'-P-CCNC: 20 U/L (ref 9–39)
BACTERIA #/AREA URNS AUTO: ABNORMAL /HPF
BASOPHILS # BLD AUTO: 0.03 X10*3/UL (ref 0–0.1)
BASOPHILS NFR BLD AUTO: 0.5 %
BILIRUB SERPL-MCNC: 0.5 MG/DL (ref 0–1.2)
BILIRUB UR STRIP.AUTO-MCNC: NEGATIVE MG/DL
BUN SERPL-MCNC: 71 MG/DL (ref 6–23)
CALCIUM SERPL-MCNC: 8.8 MG/DL (ref 8.6–10.3)
CHLORIDE SERPL-SCNC: 108 MMOL/L (ref 98–107)
CO2 SERPL-SCNC: 18 MMOL/L (ref 21–32)
COLOR UR: YELLOW
CREAT SERPL-MCNC: 3.16 MG/DL (ref 0.5–1.3)
EGFRCR SERPLBLD CKD-EPI 2021: 19 ML/MIN/1.73M*2
EOSINOPHIL # BLD AUTO: 0.32 X10*3/UL (ref 0–0.4)
EOSINOPHIL NFR BLD AUTO: 5.1 %
ERYTHROCYTE [DISTWIDTH] IN BLOOD BY AUTOMATED COUNT: 17.1 % (ref 11.5–14.5)
GLUCOSE SERPL-MCNC: 75 MG/DL (ref 74–99)
GLUCOSE UR STRIP.AUTO-MCNC: NEGATIVE MG/DL
HCT VFR BLD AUTO: 31.8 % (ref 41–52)
HGB BLD-MCNC: 9.9 G/DL (ref 13.5–17.5)
HOLD SPECIMEN: NORMAL
IMM GRANULOCYTES # BLD AUTO: 0.01 X10*3/UL (ref 0–0.5)
IMM GRANULOCYTES NFR BLD AUTO: 0.2 % (ref 0–0.9)
KETONES UR STRIP.AUTO-MCNC: NEGATIVE MG/DL
LEUKOCYTE ESTERASE UR QL STRIP.AUTO: ABNORMAL
LYMPHOCYTES # BLD AUTO: 1.15 X10*3/UL (ref 0.8–3)
LYMPHOCYTES NFR BLD AUTO: 18.3 %
MAGNESIUM SERPL-MCNC: 1.82 MG/DL (ref 1.6–2.4)
MCH RBC QN AUTO: 27.9 PG (ref 26–34)
MCHC RBC AUTO-ENTMCNC: 31.1 G/DL (ref 32–36)
MCV RBC AUTO: 90 FL (ref 80–100)
MONOCYTES # BLD AUTO: 0.35 X10*3/UL (ref 0.05–0.8)
MONOCYTES NFR BLD AUTO: 5.6 %
NEUTROPHILS # BLD AUTO: 4.43 X10*3/UL (ref 1.6–5.5)
NEUTROPHILS NFR BLD AUTO: 70.3 %
NITRITE UR QL STRIP.AUTO: NEGATIVE
NRBC BLD-RTO: 0 /100 WBCS (ref 0–0)
PH UR STRIP.AUTO: 7 [PH]
PLATELET # BLD AUTO: 226 X10*3/UL (ref 150–450)
POTASSIUM SERPL-SCNC: 5.7 MMOL/L (ref 3.5–5.3)
PROT SERPL-MCNC: 7.5 G/DL (ref 6.4–8.2)
PROT UR STRIP.AUTO-MCNC: ABNORMAL MG/DL
RBC # BLD AUTO: 3.55 X10*6/UL (ref 4.5–5.9)
RBC # UR STRIP.AUTO: ABNORMAL /UL
RBC #/AREA URNS AUTO: ABNORMAL /HPF
SODIUM SERPL-SCNC: 137 MMOL/L (ref 136–145)
SP GR UR STRIP.AUTO: 1.01
TRANS CELLS #/AREA UR COMP ASSIST: ABNORMAL /HPF
UROBILINOGEN UR STRIP.AUTO-MCNC: <2 MG/DL
WBC # BLD AUTO: 6.3 X10*3/UL (ref 4.4–11.3)
WBC #/AREA URNS AUTO: ABNORMAL /HPF

## 2024-03-06 PROCEDURE — 93005 ELECTROCARDIOGRAM TRACING: CPT

## 2024-03-06 PROCEDURE — 85025 COMPLETE CBC W/AUTO DIFF WBC: CPT | Performed by: PHYSICIAN ASSISTANT

## 2024-03-06 PROCEDURE — 83735 ASSAY OF MAGNESIUM: CPT | Performed by: PHYSICIAN ASSISTANT

## 2024-03-06 PROCEDURE — 99283 EMERGENCY DEPT VISIT LOW MDM: CPT | Mod: 25

## 2024-03-06 PROCEDURE — 84075 ASSAY ALKALINE PHOSPHATASE: CPT | Performed by: PHYSICIAN ASSISTANT

## 2024-03-06 PROCEDURE — 81001 URINALYSIS AUTO W/SCOPE: CPT | Performed by: PHYSICIAN ASSISTANT

## 2024-03-06 PROCEDURE — 87086 URINE CULTURE/COLONY COUNT: CPT | Mod: GEALAB | Performed by: PHYSICIAN ASSISTANT

## 2024-03-06 PROCEDURE — 36415 COLL VENOUS BLD VENIPUNCTURE: CPT | Performed by: PHYSICIAN ASSISTANT

## 2024-03-06 ASSESSMENT — COLUMBIA-SUICIDE SEVERITY RATING SCALE - C-SSRS
6. HAVE YOU EVER DONE ANYTHING, STARTED TO DO ANYTHING, OR PREPARED TO DO ANYTHING TO END YOUR LIFE?: NO
2. HAVE YOU ACTUALLY HAD ANY THOUGHTS OF KILLING YOURSELF?: NO
1. IN THE PAST MONTH, HAVE YOU WISHED YOU WERE DEAD OR WISHED YOU COULD GO TO SLEEP AND NOT WAKE UP?: NO

## 2024-03-06 ASSESSMENT — LIFESTYLE VARIABLES
HAVE PEOPLE ANNOYED YOU BY CRITICIZING YOUR DRINKING: NO
HAVE YOU EVER FELT YOU SHOULD CUT DOWN ON YOUR DRINKING: NO
EVER HAD A DRINK FIRST THING IN THE MORNING TO STEADY YOUR NERVES TO GET RID OF A HANGOVER: NO
EVER FELT BAD OR GUILTY ABOUT YOUR DRINKING: NO

## 2024-03-06 ASSESSMENT — PAIN - FUNCTIONAL ASSESSMENT: PAIN_FUNCTIONAL_ASSESSMENT: 0-10

## 2024-03-06 ASSESSMENT — PAIN SCALES - GENERAL: PAINLEVEL_OUTOF10: 0 - NO PAIN

## 2024-03-06 NOTE — ED PROVIDER NOTES
HPI   Chief Complaint   Patient presents with    Abnormal Labs       79-year-old male with PMH CKD presented for evaluation of abnormal labs with elevated creatinine and hyperkalemia on outpatient labs so sent in for further evaluation.  Has a history of cystectomy with ileal conduit.  Denies any symptoms.  Does not get dialysis.      History provided by:  Patient   used: No                        [place] Coma Scale Score: 15                     Patient History   Past Medical History:   Diagnosis Date    Alcohol dependence in remission (CMS/[facility]) 10/25/2023    Bilateral carotid artery stenosis 10/25/2023    Infection and inflammatory reaction due to internal right knee prosthesis, initial encounter (CMS/[facility]) 2020    Infection of total right knee replacement, initial encounter    Old myocardial infarction     History of myocardial infarction    Opioid dependence in remission (CMS/[facility]) 10/25/2023    Other obesity due to excess calories 2020    Class 1 obesity due to excess calories with serious comorbidity and body mass index (BMI) of 30.0 to 30.9 in adult     Past Surgical History:   Procedure Laterality Date    OTHER SURGICAL HISTORY  2019    Appendectomy    OTHER SURGICAL HISTORY  2019    Tonsillectomy    OTHER SURGICAL HISTORY  2019    Knee replacement    OTHER SURGICAL HISTORY  2019    Inguinal hernia repair    OTHER SURGICAL HISTORY  2019    Cardiac catheterization with stent placement    OTHER SURGICAL HISTORY  2019    Corneal lasik    OTHER SURGICAL HISTORY  2020    Cystoscopy     Family History   Problem Relation Name Age of Onset    Stroke Mother      Throat cancer Father      Colon cancer Sister 53     Colon cancer Brother 51      Social History     Tobacco Use    Smoking status: Former     Types: Cigarettes     Quit date: 2005     Years since quittin.1    Smokeless tobacco: Never   Vaping Use    Vaping Use: Never used    Substance Use Topics    Alcohol use: Never    Drug use: Yes     Types: Marijuana     Comment: medical GARY gummies       Physical Exam   ED Triage Vitals [03/06/24 1148]   Temperature Heart Rate Respirations BP   36.6 °C (97.9 °F) 60 18 171/63      Pulse Ox Temp Source Heart Rate Source Patient Position   98 % Temporal Monitor --      BP Location FiO2 (%)     -- --       Physical Exam  Constitutional:       Appearance: Normal appearance.   HENT:      Mouth/Throat:      Mouth: Mucous membranes are moist.      Pharynx: Oropharynx is clear.   Cardiovascular:      Rate and Rhythm: Normal rate and regular rhythm.      Pulses: Normal pulses.      Heart sounds: Normal heart sounds.   Pulmonary:      Effort: Pulmonary effort is normal.      Breath sounds: Normal breath sounds.   Abdominal:      General: There is no distension.      Palpations: Abdomen is soft.      Tenderness: There is no abdominal tenderness. There is no guarding.   Musculoskeletal:      Cervical back: Normal range of motion and neck supple.   Skin:     General: Skin is warm and dry.   Neurological:      General: No focal deficit present.      Mental Status: He is alert and oriented to person, place, and time.         ED Course & MDM   Diagnoses as of 03/06/24 1442   Hyperkalemia   Renal insufficiency       Medical Decision Making  DDx: Electrolyte derangement, hyperkalemia, acute on chronic renal failure, dysrhythmia    Visibly nontoxic-appearing no apparent distress.  Vital signs stable.  Has no complaints.  His potassium is 5.7 with a creatinine of 3.16 today.  No EKG changes.  I discussed the case with Dr. Holliday, advised that since the patient has no symptoms or EKG changes hold off on treating hyper-K right now, stop lisinopril, follow-up tomorrow in the office.  Patient is in agreement with this plan.  This visit was staffed with the attending physician Dr. Coronado.      Disclaimer: This note was dictated using speech recognition software. An attempt  at proofreading was made to minimize errors. Minor errors in transcription may be present. Please call if questions.    Amount and/or Complexity of Data Reviewed  Labs: ordered.  ECG/medicine tests: ordered and independent interpretation performed.     Details: EKG interpreted by me: Sinus rhythm.  Sinus arrhythmia.  Rate 65.  Normal axis.  QTc 418 ms.  No STEMI.        Procedure  Procedures     Earnest Varma PA-C  03/06/24 1457

## 2024-03-06 NOTE — PROGRESS NOTES
Phone call to patient. Left message. Critical high Potassium level. Instructed to go to the ER for further evaluation. Requested call back to verify receipt of message!

## 2024-03-06 NOTE — DISCHARGE INSTRUCTIONS
STOP TAKING LISINOPRIL. Dr. Holliday can see you at the [facility] tomorrow please call his office

## 2024-03-06 NOTE — PROGRESS NOTES
Called patient again to notify of critical high potassium results. Instructed to go to ER for further evaluation.

## 2024-03-06 NOTE — ED TRIAGE NOTES
Patient with a hx of stage 4 kidney failure was sent in by PCP for abnormal lab work that was done yesterday. Patient has no complaints.

## 2024-03-07 LAB — BACTERIA UR CULT: ABNORMAL

## 2024-03-08 ENCOUNTER — TELEPHONE (OUTPATIENT)
Dept: PHARMACY | Facility: HOSPITAL | Age: 79
End: 2024-03-08
Payer: MEDICARE

## 2024-03-10 LAB
ATRIAL RATE: 65 BPM
P AXIS: 52 DEGREES
P OFFSET: 178 MS
P ONSET: 137 MS
PR INTERVAL: 152 MS
Q ONSET: 213 MS
QRS COUNT: 11 BEATS
QRS DURATION: 88 MS
QT INTERVAL: 402 MS
QTC CALCULATION(BAZETT): 418 MS
QTC FREDERICIA: 412 MS
R AXIS: -20 DEGREES
T AXIS: 33 DEGREES
T OFFSET: 414 MS
VENTRICULAR RATE: 65 BPM

## 2024-03-19 ENCOUNTER — APPOINTMENT (OUTPATIENT)
Dept: PRIMARY CARE | Facility: CLINIC | Age: 79
End: 2024-03-19
Payer: MEDICARE

## 2024-04-05 DIAGNOSIS — E78.5 HYPERLIPIDEMIA, UNSPECIFIED HYPERLIPIDEMIA TYPE: ICD-10-CM

## 2024-04-05 DIAGNOSIS — E03.9 HYPOTHYROIDISM, UNSPECIFIED TYPE: ICD-10-CM

## 2024-04-05 RX ORDER — LEVOTHYROXINE SODIUM 75 UG/1
75 TABLET ORAL DAILY
Qty: 90 TABLET | Refills: 3 | Status: SHIPPED | OUTPATIENT
Start: 2024-04-05 | End: 2024-05-08

## 2024-04-05 RX ORDER — ATORVASTATIN CALCIUM 20 MG/1
20 TABLET, FILM COATED ORAL DAILY
Qty: 90 TABLET | Refills: 3 | Status: SHIPPED | OUTPATIENT
Start: 2024-04-05 | End: 2024-05-06 | Stop reason: SDUPTHER

## 2024-05-06 DIAGNOSIS — E78.5 HYPERLIPIDEMIA, UNSPECIFIED HYPERLIPIDEMIA TYPE: Primary | ICD-10-CM

## 2024-05-06 RX ORDER — CHLORTHALIDONE 25 MG/1
25 TABLET ORAL
COMMUNITY
Start: 2024-03-07 | End: 2025-03-07

## 2024-05-07 ENCOUNTER — OFFICE VISIT (OUTPATIENT)
Dept: PHYSICAL MEDICINE AND REHAB | Facility: CLINIC | Age: 79
End: 2024-05-07
Payer: MEDICARE

## 2024-05-07 ENCOUNTER — LAB (OUTPATIENT)
Dept: LAB | Facility: LAB | Age: 79
End: 2024-05-07
Payer: MEDICARE

## 2024-05-07 VITALS
SYSTOLIC BLOOD PRESSURE: 152 MMHG | WEIGHT: 145 LBS | TEMPERATURE: 98.1 F | BODY MASS INDEX: 22.71 KG/M2 | DIASTOLIC BLOOD PRESSURE: 84 MMHG | HEART RATE: 82 BPM

## 2024-05-07 DIAGNOSIS — I12.9 HYPERTENSIVE CHRONIC KIDNEY DISEASE WITH STAGE 1 THROUGH STAGE 4 CHRONIC KIDNEY DISEASE, OR UNSPECIFIED CHRONIC KIDNEY DISEASE: Primary | ICD-10-CM

## 2024-05-07 DIAGNOSIS — R53.1 WEAKNESS: ICD-10-CM

## 2024-05-07 DIAGNOSIS — G37.3 TRANSVERSE MYELITIS (MULTI): ICD-10-CM

## 2024-05-07 DIAGNOSIS — M47.812 SPONDYLOSIS OF CERVICAL REGION WITHOUT MYELOPATHY OR RADICULOPATHY: ICD-10-CM

## 2024-05-07 DIAGNOSIS — M48.062 SPINAL STENOSIS, LUMBAR REGION WITH NEUROGENIC CLAUDICATION: Primary | ICD-10-CM

## 2024-05-07 DIAGNOSIS — R26.89 BALANCE DISORDER: ICD-10-CM

## 2024-05-07 DIAGNOSIS — M21.372 LEFT FOOT DROP: ICD-10-CM

## 2024-05-07 DIAGNOSIS — M54.16 LUMBAR RADICULOPATHY: ICD-10-CM

## 2024-05-07 DIAGNOSIS — N18.4 CHRONIC KIDNEY DISEASE, STAGE 4 (SEVERE) (MULTI): ICD-10-CM

## 2024-05-07 LAB
ALBUMIN SERPL BCP-MCNC: 4.3 G/DL (ref 3.4–5)
ANION GAP SERPL CALC-SCNC: 17 MMOL/L (ref 10–20)
BUN SERPL-MCNC: 68 MG/DL (ref 6–23)
CALCIUM SERPL-MCNC: 8.7 MG/DL (ref 8.6–10.3)
CHLORIDE SERPL-SCNC: 102 MMOL/L (ref 98–107)
CO2 SERPL-SCNC: 22 MMOL/L (ref 21–32)
CREAT SERPL-MCNC: 3.94 MG/DL (ref 0.5–1.3)
EGFRCR SERPLBLD CKD-EPI 2021: 15 ML/MIN/1.73M*2
GLUCOSE SERPL-MCNC: 117 MG/DL (ref 74–99)
PHOSPHATE SERPL-MCNC: 2.9 MG/DL (ref 2.5–4.9)
POTASSIUM SERPL-SCNC: 3.9 MMOL/L (ref 3.5–5.3)
PROT SERPL-MCNC: 7.1 G/DL (ref 6.4–8.2)
SODIUM SERPL-SCNC: 137 MMOL/L (ref 136–145)
URATE SERPL-MCNC: 6.1 MG/DL (ref 4–7.5)

## 2024-05-07 PROCEDURE — 86038 ANTINUCLEAR ANTIBODIES: CPT

## 2024-05-07 PROCEDURE — 84155 ASSAY OF PROTEIN SERUM: CPT

## 2024-05-07 PROCEDURE — 99213 OFFICE O/P EST LOW 20 MIN: CPT | Performed by: PHYSICAL MEDICINE & REHABILITATION

## 2024-05-07 PROCEDURE — 83521 IG LIGHT CHAINS FREE EACH: CPT

## 2024-05-07 PROCEDURE — 36415 COLL VENOUS BLD VENIPUNCTURE: CPT

## 2024-05-07 PROCEDURE — 80069 RENAL FUNCTION PANEL: CPT

## 2024-05-07 PROCEDURE — 3079F DIAST BP 80-89 MM HG: CPT | Performed by: PHYSICAL MEDICINE & REHABILITATION

## 2024-05-07 PROCEDURE — 86160 COMPLEMENT ANTIGEN: CPT

## 2024-05-07 PROCEDURE — 1157F ADVNC CARE PLAN IN RCRD: CPT | Performed by: PHYSICAL MEDICINE & REHABILITATION

## 2024-05-07 PROCEDURE — 3077F SYST BP >= 140 MM HG: CPT | Performed by: PHYSICAL MEDICINE & REHABILITATION

## 2024-05-07 PROCEDURE — 1160F RVW MEDS BY RX/DR IN RCRD: CPT | Performed by: PHYSICAL MEDICINE & REHABILITATION

## 2024-05-07 PROCEDURE — 1125F AMNT PAIN NOTED PAIN PRSNT: CPT | Performed by: PHYSICAL MEDICINE & REHABILITATION

## 2024-05-07 PROCEDURE — 86225 DNA ANTIBODY NATIVE: CPT

## 2024-05-07 PROCEDURE — 84550 ASSAY OF BLOOD/URIC ACID: CPT

## 2024-05-07 PROCEDURE — 1159F MED LIST DOCD IN RCRD: CPT | Performed by: PHYSICAL MEDICINE & REHABILITATION

## 2024-05-07 PROCEDURE — 86036 ANCA SCREEN EACH ANTIBODY: CPT

## 2024-05-07 RX ORDER — ATORVASTATIN CALCIUM 20 MG/1
20 TABLET, FILM COATED ORAL DAILY
Qty: 90 TABLET | Refills: 3 | Status: SHIPPED | OUTPATIENT
Start: 2024-05-07

## 2024-05-07 ASSESSMENT — PAIN SCALES - GENERAL: PAINLEVEL: 5

## 2024-05-07 NOTE — PROGRESS NOTES
Provider Impressions     This is a pleasant 79 year old right handed male with PMH of bladder cancer, hypothyroidism, A- fib, GERD, HTN, CAD, Hepatitis C, depression, CKD3 who presents for follow-up of chronic low for back pain. Physical exam is positive for weakness in left foot ankle evertors dorsiflexion and mild hyperreflexia. Symptoms and physical exam findings in this complex patient are of unclear etiology at this time, but differential includes L5 radiculopathy lumbar and/or cervical spine stenosis, and given history of transverse myelitis and bladder cancer, syrinx and metastases respectively are on the differential. Isolated peroneal neuropathy is also on the differential.        - If you decide that you want to, start gabapentin 100 mg at night for a few days, once you feel fine then go up to 200 mg, and then eventually to 300 mg at night for now.  -Consider other medications in the future  -Continue physical therapy and daily home exercises, this should be dynamic and focusing on active strengthening.  -Consider injections in the future: Trigger point injections (handout provided previously), ultrasound-guided SI joint injections, referral for spine injections  -Consider EMG in the future  -Consider referral to surgical spine in the future  -Follow-up 6 months or sooner if needed, red flags discussed        The patient expressed understanding and agreement with the assessment and plan. Patient encouraged to contact us should they have any questions, concerns, or any changes in symptoms.        Thank you for allowing me to participate in the care of your patient.     ** Dictated with voice recognition software, please forgive any errors in grammar and/or spelling **      Chief Complaint     Lower back and bilateral leg pain follow-up     History of Present Illness    This is a pleasant 79 year old right handed male with PMH of bladder cancer (in remission treated with bladder resection),  hypothyroidism, A- fib, GERD, HTN, CAD, Hepatitis C, depression, CKD3 who presents for follow-up of chronic back pain.      He was last seen here on 11/7/2023, at which point I advised him to consider starting gabapentin at night if needed. He never started it. Didn't feel the need.    Advised to continue physical therapy and daily home exercises. This has been helping significantly.  Has been helping more for the lower body and foot weakness, not as much for the upper body.  But he admits that he has not been doing that much dynamic strengthening exercises for the upper body at all.  He is to have difficulty opening cans and jars, but he is able to do that better, so there is some improvement, but it is lower than the lower body.    Red flags discussed with him.  He denies all of them today aside from the known left foot weakness.   This has improved significantly according to the patient. He denies any red flags.    He rates his pain as 5/10, previously 5/10. Mostly low back, PT helps, not interested in meds or injections for this at this time. TENS unit helps.     Otherwise, there have been no changes to his medications or past medical history since last visit     ______________________________  9/1/2023: MRIs reviewed, try gabapentin if you want, consider injections, physical therapy referral provided, consider referral to surgery   11/7/23: Continue physical therapy, home exercises, consider gabapentin other medications, consider referral to surgery, follow-up 6 months  5/7/2024:  Continue physical therapy, home exercises, consider gabapentin other medications, consider referral to surgery, follow-up 6 months  _____________________________  As a reminder:     TIMELINE OF COMPLAINT(S):  Pain started over 30 years ago, maybe a couple of months after a fall in the [place] while working there. Started in low back. Getting worse in frequency. Standing up with arms forward makes it worse. Sitting makes better.  Occasionally goes down posterior legs to ankles, switches between sides. About 3 times/ week. Pain in back is constant. Low back feels throbbing. Left side is worse. Was told he has neuropathy in feet. Feels pain shooting down when fatigued.      Did have a fall about 2 months ago into shower, was sick for a few weeks and didn't feel good and passed out , landed on both anterior knees, passed out 10 min, - has had problem with left foot slapping ground since then. Pain not worse since fall in the shower. Falling or almost falling more lately.     Did have transverse myelitis in his 20s- was paralyzed waist down for 9 months. Was at T10. Recovered completely except some sensation changes from waist down.      Needs stool softeners daily since bladder removal 2 years ago. no incontinence, no saddle anesthesia.      Pain:  LOCATION- Lower back and bilateral leg  RADIATION-  ASSOCIATED WITH- Swelling  NUMBNESS/TINGLING- Yes, fingers  WEAKNESS- Yes  CONSTANT or INTERMITTENT- Intermittent  SEVERITY/QUANTITY- 5  QUALITY- Achy, throbbing  EXACERBATED BY- Bending over  BETTER WITH- Sitting down  TRIED- Acetaminophen- does help, Oxycodone- does not take that, medical marijuana    Anti-Inflammatories: (has CKD) Aleve- cannot take NSAIDS anymore   Muscle relaxants:   Anti-depressants:    Neuroleptics: previously Gabapentin- doesn't remember but thinks something was wrong    LDN:        PHYSICAL THERAPY: Has not had any for back- referred in April?  TENS unit: Yes- helps temporarily   CHIROPRACTIC MANIPULATION:Yes- has not been in >10 years   ACUPUNCTURE TREATMENTS: Yes- helped at the time   DEEP TISSUE MASSAGE THERAPY: No  OSTEOPATHIC MANIPULATION THERAPY: No     INJECTIONS: Yes  - Had nerve blocks done in back ( now retired)- helped for a few days     EMG/NCS: Yes, lower  Through [facility]- doesn't remember what it showed , many years ago     IMAGING: No lumbar images     MRI Cervical and Thoracic Spine 2/19/21: Cervical  spondylosis, worse as compared to prior MRI from 2009. Multilevel thoracic degenerative changes most significantly causing mild- moderate central canal stenosis at T10-11.      I ordered lumbar, thoracic, and cervical MRIs and these were done on 8/24/2023. Lumbar MRI showed multilevel spondylosis with severe bilateral foraminal stenosis at L5-S1 due to a central and right paracentral herniated nucleus pulposus, severe bilateral foraminal narrowing at L4-5 and L3-4. No severe central canal stenosis. Thoracic MRI showed multilevel spondylosis without any severe canal or foraminal narrowing. Unchanged from previous MRI. There is fusion of the vertebral bodies with normal alignment. Cervical MRI showed multilevel cervical spondylosis without significant central canal or foraminal narrowing. No cord compression.    CT C/A/P 3/29/23: No definitive new metastatic disease. No focal suspicious skeletal lesions. Degenerative changes of the spine.      CT Cervical Spine 9/18/18: There is mild narrowing of C2-3 disc space. Prominent narrowing of C 4-5, C5-6, and C6-7 disc spaces noted. Grade 1 anterolisthesis of C3 and C4 is seen. Uncovertebral joint hypertrophy is noted. Disc bulge at C3-4 level. Small disc bulges seen at C5-6.         FUNCTIONAL HISTORY: The patient is independent in all ADLs, mobility, and driving. The patient does not use any assistive device.     SH:  Lives in: [place]  Lives with: Wife  Occupation: Maintenance  Tobacco: No  Alcohol: No  Drugs: Medical Marijuana        _______________________  ROS: The patient denies any bowel or bladder incontinence/accidents, night sweats, fevers, chills, recent significant weight loss. A 14 point review of systems was reviewed with the patient and is as above and otherwise negative.,  ROS Questionnaire positive for back pain, joint pain, muscle spasms      Physical Exam  GEN - Alert, well-developed, thin, no acute distress  PSYCH - Cooperative, appropriate mood and  affect  HEENT - NC/AT  RESP - Non-labored respirations, equal expansion  CV - warm and well-perfused, No cyanosis, there was mild edema in extremities.   ABD- soft, ND  SKIN - No visible rash.     Previous  BACK/SPINE - Symmetric posture, no erythema, edema, or swelling. Mild bilateral low back pain with lumbar flexion, extension, rotation, and side bend. No pain with facet loading. No tenderness of lumbosacral spinous processes. Mild tenderness over the bilateral lumbar paraspinal muscles. No tenderness over the iliolumbar ligaments bilaterally. No TTP of bilateral PSIS, sacral sulcus. Some tenderness over gluteus medius, piriformis. Straight leg raise negative bilaterally. Sitting slump test negative bilaterally. Femoral stretch test negative bilaterally.        HIPS/PELVIS - Symmetric in standing and lying Passive hip flexion, internal rotation, and external rotation within functional normal limits bilaterally with provocation of low back pain symptoms. No tenderness over iliopsoas tendon. Positive FABERs bilaterally. Negative hip clicking Negative resisted active SLR. Back pain with deep hip flexion        NEURO -  Weakness dorsiflex left foot, 3/5, 1/5 ankle eversion, EHL, 5/5 ankle plantarflexion, inversion,  5-/5 knee flexion left   5 -/5 bilateral hip flexion  5-/5 right ankle dorsiflexion  5 -/5 ADM and FDI on the right  All other muscles 5/5  Somewhat brisk reflexes more so on the right, diminished Achilles reflexes bilaterally. Normal upper extremity reflexes, negative Ilir's  Sensation intact to light touch bilateral lower extremities  GAIT - Normal base, normal stride length, hunched over, some foot slapping on left side

## 2024-05-07 NOTE — PATIENT INSTRUCTIONS
- If you decide that you want to, start gabapentin 100 mg at night for a few days, once you feel fine then go up to 200 mg, and then eventually to 300 mg at night for now.  -Consider other medications in the future  -Continue physical therapy and daily home exercises, this should be dynamic and focusing on active strengthening.,  -Consider injections in the future: Trigger point injections (handout provided previously), ultrasound-guided SI joint injections, referral for spine injections  -Consider EMG in the future  -Consider referral to surgical spine in the future  -Follow-up 6 months or sooner if needed, red flags discussed

## 2024-05-08 DIAGNOSIS — E03.9 HYPOTHYROIDISM, UNSPECIFIED TYPE: ICD-10-CM

## 2024-05-08 LAB
C3 SERPL-MCNC: 124 MG/DL (ref 87–200)
C4 SERPL-MCNC: 36 MG/DL (ref 10–50)
DSDNA AB SER-ACNC: 2 IU/ML
KAPPA LC SERPL-MCNC: 10.67 MG/DL (ref 0.33–1.94)
KAPPA LC/LAMBDA SER: 1.71 {RATIO} (ref 0.26–1.65)
LAMBDA LC SERPL-MCNC: 6.24 MG/DL (ref 0.57–2.63)

## 2024-05-08 RX ORDER — LEVOTHYROXINE SODIUM 75 UG/1
75 TABLET ORAL DAILY
Qty: 90 TABLET | Refills: 3 | Status: SHIPPED | OUTPATIENT
Start: 2024-05-08

## 2024-05-11 LAB
ANCA AB PATTERN SER IF-IMP: NORMAL
ANCA IGG TITR SER IF: NORMAL {TITER}

## 2024-05-13 LAB
ANA PATTERN: ABNORMAL
ANA SER QL HEP2 SUBST: POSITIVE
ANA TITR SER IF: ABNORMAL {TITER}

## 2024-06-07 ENCOUNTER — OFFICE VISIT (OUTPATIENT)
Dept: PRIMARY CARE | Facility: CLINIC | Age: 79
End: 2024-06-07
Payer: MEDICARE

## 2024-06-07 VITALS
HEIGHT: 67 IN | OXYGEN SATURATION: 97 % | WEIGHT: 152.6 LBS | DIASTOLIC BLOOD PRESSURE: 60 MMHG | BODY MASS INDEX: 23.95 KG/M2 | TEMPERATURE: 98.4 F | HEART RATE: 63 BPM | SYSTOLIC BLOOD PRESSURE: 152 MMHG

## 2024-06-07 DIAGNOSIS — R22.1 MASS OF LEFT SIDE OF NECK: Primary | ICD-10-CM

## 2024-06-07 PROCEDURE — 3078F DIAST BP <80 MM HG: CPT | Performed by: NURSE PRACTITIONER

## 2024-06-07 PROCEDURE — 3077F SYST BP >= 140 MM HG: CPT | Performed by: NURSE PRACTITIONER

## 2024-06-07 PROCEDURE — 1157F ADVNC CARE PLAN IN RCRD: CPT | Performed by: NURSE PRACTITIONER

## 2024-06-07 PROCEDURE — 1036F TOBACCO NON-USER: CPT | Performed by: NURSE PRACTITIONER

## 2024-06-07 PROCEDURE — 1159F MED LIST DOCD IN RCRD: CPT | Performed by: NURSE PRACTITIONER

## 2024-06-07 PROCEDURE — 99214 OFFICE O/P EST MOD 30 MIN: CPT | Performed by: NURSE PRACTITIONER

## 2024-06-07 PROCEDURE — 1124F ACP DISCUSS-NO DSCNMKR DOCD: CPT | Performed by: NURSE PRACTITIONER

## 2024-06-07 PROCEDURE — 1125F AMNT PAIN NOTED PAIN PRSNT: CPT | Performed by: NURSE PRACTITIONER

## 2024-06-07 RX ORDER — SULFAMETHOXAZOLE AND TRIMETHOPRIM 400; 80 MG/1; MG/1
1 TABLET ORAL 2 TIMES DAILY
Qty: 14 TABLET | Refills: 0 | Status: SHIPPED | OUTPATIENT
Start: 2024-06-07 | End: 2024-06-14

## 2024-06-07 ASSESSMENT — ENCOUNTER SYMPTOMS
ACTIVITY CHANGE: 0
HEADACHES: 0
GASTROINTESTINAL NEGATIVE: 1
CONSTIPATION: 0
ALLERGIC/IMMUNOLOGIC NEGATIVE: 1
APPETITE CHANGE: 0
DIZZINESS: 0
DYSURIA: 0
AGITATION: 0
DEPRESSION: 0
DIARRHEA: 0
COUGH: 0
NEUROLOGICAL NEGATIVE: 1
CARDIOVASCULAR NEGATIVE: 1
LOSS OF SENSATION IN FEET: 0
SORE THROAT: 0
SHORTNESS OF BREATH: 0
BRUISES/BLEEDS EASILY: 0
OCCASIONAL FEELINGS OF UNSTEADINESS: 0
CHEST TIGHTNESS: 0
ARTHRALGIAS: 0
EYE REDNESS: 0
ENDOCRINE NEGATIVE: 1
WHEEZING: 0
BLOOD IN STOOL: 0
DIFFICULTY URINATING: 0
NERVOUS/ANXIOUS: 0
HEMATURIA: 0
LIGHT-HEADEDNESS: 0
RESPIRATORY NEGATIVE: 1
EYE DISCHARGE: 0
ABDOMINAL PAIN: 0
PALPITATIONS: 0
EYES NEGATIVE: 1
FREQUENCY: 0
JOINT SWELLING: 0
TREMORS: 0
ADENOPATHY: 0

## 2024-06-07 ASSESSMENT — PATIENT HEALTH QUESTIONNAIRE - PHQ9
1. LITTLE INTEREST OR PLEASURE IN DOING THINGS: NOT AT ALL
SUM OF ALL RESPONSES TO PHQ9 QUESTIONS 1 AND 2: 1
2. FEELING DOWN, DEPRESSED OR HOPELESS: SEVERAL DAYS
10. IF YOU CHECKED OFF ANY PROBLEMS, HOW DIFFICULT HAVE THESE PROBLEMS MADE IT FOR YOU TO DO YOUR WORK, TAKE CARE OF THINGS AT HOME, OR GET ALONG WITH OTHER PEOPLE: NOT DIFFICULT AT ALL

## 2024-06-07 ASSESSMENT — LIFESTYLE VARIABLES
HOW OFTEN DO YOU HAVE A DRINK CONTAINING ALCOHOL: NEVER
HOW MANY STANDARD DRINKS CONTAINING ALCOHOL DO YOU HAVE ON A TYPICAL DAY: PATIENT DOES NOT DRINK

## 2024-06-07 ASSESSMENT — PAIN SCALES - GENERAL: PAINLEVEL: 6

## 2024-06-07 NOTE — PROGRESS NOTES
Subjective   Patient ID: Devin Fung Jr. is a 79 y.o. male who presents for Mass (Pt present for a lump left side of his neck.).    Presents today with concerns for lump to the left side of his neck just below his ear.  He notes it has been present for about 3 to 4 weeks.  He states he has been on amoxicillin for 10 days for UTI symptoms.  He states he just completed that on .  He states during the period the lump has gotten bigger.  He reports it is painful to touch.  He denies difficulty swallowing.  He denies shortness of breath.  He denies any open or draining areas.  He denies any sore throat.  He does note that he did have pain and pressure to his left ear prior to the lump showing up.  Denies elevated temperature during this period of time.  He notes he is particularly concerned because he has a history of smoking and he was a .  He also notes that his father  from throat cancer.  * This note was partially generated using the Dragon Voice recognition system. There may be some incorrect wording, spelling and/or spelling errors or punctuation errors that were not corrected prior to committing the note to the medical record.*           Review of Systems   Constitutional:  Negative for activity change and appetite change.   HENT:  Negative for congestion, ear pain, hearing loss and sore throat.    Eyes: Negative.  Negative for discharge, redness and visual disturbance.   Respiratory: Negative.  Negative for cough, chest tightness, shortness of breath and wheezing.    Cardiovascular: Negative.  Negative for chest pain, palpitations and leg swelling.   Gastrointestinal: Negative.  Negative for abdominal pain, blood in stool, constipation and diarrhea.   Endocrine: Negative.    Genitourinary: Negative.  Negative for difficulty urinating, dysuria, frequency, hematuria, penile discharge and testicular pain.   Musculoskeletal:  Negative for arthralgias and joint swelling.   Skin:  Negative  "for rash.   Allergic/Immunologic: Negative.    Neurological: Negative.  Negative for dizziness, tremors, light-headedness and headaches.   Hematological:  Negative for adenopathy. Does not bruise/bleed easily.   Psychiatric/Behavioral:  Negative for agitation. The patient is not nervous/anxious.        Objective   /60 Comment: skipped multiple times  Pulse 63   Temp 36.9 °C (98.4 °F) (Temporal)   Ht 1.702 m (5' 7\")   Wt 69.2 kg (152 lb 9.6 oz)   SpO2 97%   BMI 23.90 kg/m²     Physical Exam  Constitutional:       General: He is not in acute distress.     Appearance: Normal appearance.   HENT:      Head: Normocephalic.   Eyes:      Conjunctiva/sclera: Conjunctivae normal.   Neck:      Vascular: Normal carotid pulses. No carotid bruit.     Cardiovascular:      Rate and Rhythm: Normal rate and regular rhythm.      Heart sounds: Normal heart sounds.   Pulmonary:      Effort: Pulmonary effort is normal.      Breath sounds: Normal breath sounds.   Abdominal:      General: Bowel sounds are normal.      Palpations: Abdomen is soft.   Musculoskeletal:         General: Normal range of motion.      Cervical back: Erythema present. No crepitus. No pain with movement.   Skin:     General: Skin is warm and dry.      Capillary Refill: Capillary refill takes less than 2 seconds.   Neurological:      Mental Status: He is alert and oriented to person, place, and time.   Psychiatric:         Mood and Affect: Mood normal.         Speech: Speech normal.         Assessment/Plan   Problem List Items Addressed This Visit    None  Visit Diagnoses         Codes    Mass of left side of neck    -  Primary R22.1    Relevant Medications    sulfamethoxazole-trimethoprim (Bactrim) 400-80 mg tablet    Other Relevant Orders    US head neck soft tissue        Schedule ultrasound of neck  Start Bactrim Single Strength, 2 times daily.  For 7 days.  Take until finished even if you feel better before then.   Call for worsening or not improving " symptoms  Keep a close check on your INR results. Make sure the doctor knows you are on Bactrim

## 2024-06-07 NOTE — PATIENT INSTRUCTIONS
Schedule ultrasound of neck  Start Bactrim Single Strength, 2 times daily.  For 7 days.  Take until finished even if you feel better before then.   Call for worsening or not improving symptoms  Keep a close check on your INR results. Make sure the doctor knows you are on Bactrim

## 2024-06-12 ENCOUNTER — HOSPITAL ENCOUNTER (OUTPATIENT)
Dept: RADIOLOGY | Facility: HOSPITAL | Age: 79
Discharge: HOME | End: 2024-06-12
Payer: MEDICARE

## 2024-06-12 DIAGNOSIS — R22.1 MASS OF LEFT SIDE OF NECK: ICD-10-CM

## 2024-06-12 PROCEDURE — 76536 US EXAM OF HEAD AND NECK: CPT | Performed by: RADIOLOGY

## 2024-06-12 PROCEDURE — 76536 US EXAM OF HEAD AND NECK: CPT

## 2024-06-14 ENCOUNTER — LAB (OUTPATIENT)
Dept: LAB | Facility: LAB | Age: 79
End: 2024-06-14
Payer: MEDICARE

## 2024-06-14 ENCOUNTER — HOSPITAL ENCOUNTER (OUTPATIENT)
Dept: RADIOLOGY | Facility: HOSPITAL | Age: 79
Discharge: HOME | End: 2024-06-14
Payer: MEDICARE

## 2024-06-14 DIAGNOSIS — N18.4 CHRONIC KIDNEY DISEASE, STAGE 4 (SEVERE) (MULTI): ICD-10-CM

## 2024-06-14 DIAGNOSIS — N18.4 CKD (CHRONIC KIDNEY DISEASE) STAGE 4, GFR 15-29 ML/MIN (MULTI): ICD-10-CM

## 2024-06-14 DIAGNOSIS — E03.9 HYPOTHYROIDISM, UNSPECIFIED TYPE: ICD-10-CM

## 2024-06-14 LAB
ALBUMIN SERPL BCP-MCNC: 4.5 G/DL (ref 3.4–5)
ALP SERPL-CCNC: 48 U/L (ref 33–136)
ALT SERPL W P-5'-P-CCNC: 12 U/L (ref 10–52)
ANION GAP SERPL CALC-SCNC: 17 MMOL/L (ref 10–20)
AST SERPL W P-5'-P-CCNC: 19 U/L (ref 9–39)
BASOPHILS # BLD AUTO: 0.04 X10*3/UL (ref 0–0.1)
BASOPHILS NFR BLD AUTO: 0.6 %
BILIRUB SERPL-MCNC: 0.3 MG/DL (ref 0–1.2)
BUN SERPL-MCNC: 62 MG/DL (ref 6–23)
CALCIUM SERPL-MCNC: 8.5 MG/DL (ref 8.6–10.3)
CHLORIDE SERPL-SCNC: 106 MMOL/L (ref 98–107)
CO2 SERPL-SCNC: 20 MMOL/L (ref 21–32)
CREAT SERPL-MCNC: 4.13 MG/DL (ref 0.5–1.3)
EGFRCR SERPLBLD CKD-EPI 2021: 14 ML/MIN/1.73M*2
EOSINOPHIL # BLD AUTO: 0.2 X10*3/UL (ref 0–0.4)
EOSINOPHIL NFR BLD AUTO: 3.1 %
ERYTHROCYTE [DISTWIDTH] IN BLOOD BY AUTOMATED COUNT: 16.6 % (ref 11.5–14.5)
GLUCOSE SERPL-MCNC: 76 MG/DL (ref 74–99)
HCT VFR BLD AUTO: 33.1 % (ref 41–52)
HGB BLD-MCNC: 9.8 G/DL (ref 13.5–17.5)
IMM GRANULOCYTES # BLD AUTO: 0.01 X10*3/UL (ref 0–0.5)
IMM GRANULOCYTES NFR BLD AUTO: 0.2 % (ref 0–0.9)
LYMPHOCYTES # BLD AUTO: 1.37 X10*3/UL (ref 0.8–3)
LYMPHOCYTES NFR BLD AUTO: 21 %
MCH RBC QN AUTO: 25.9 PG (ref 26–34)
MCHC RBC AUTO-ENTMCNC: 29.6 G/DL (ref 32–36)
MCV RBC AUTO: 88 FL (ref 80–100)
MONOCYTES # BLD AUTO: 0.36 X10*3/UL (ref 0.05–0.8)
MONOCYTES NFR BLD AUTO: 5.5 %
NEUTROPHILS # BLD AUTO: 4.53 X10*3/UL (ref 1.6–5.5)
NEUTROPHILS NFR BLD AUTO: 69.6 %
NRBC BLD-RTO: 0 /100 WBCS (ref 0–0)
PLATELET # BLD AUTO: 280 X10*3/UL (ref 150–450)
POTASSIUM SERPL-SCNC: 4.5 MMOL/L (ref 3.5–5.3)
PROT SERPL-MCNC: 7.2 G/DL (ref 6.4–8.2)
RBC # BLD AUTO: 3.78 X10*6/UL (ref 4.5–5.9)
SODIUM SERPL-SCNC: 138 MMOL/L (ref 136–145)
TSH SERPL-ACNC: 1.66 MIU/L (ref 0.44–3.98)
WBC # BLD AUTO: 6.5 X10*3/UL (ref 4.4–11.3)

## 2024-06-14 PROCEDURE — 80053 COMPREHEN METABOLIC PANEL: CPT

## 2024-06-14 PROCEDURE — 36415 COLL VENOUS BLD VENIPUNCTURE: CPT

## 2024-06-14 PROCEDURE — 76770 US EXAM ABDO BACK WALL COMP: CPT | Performed by: STUDENT IN AN ORGANIZED HEALTH CARE EDUCATION/TRAINING PROGRAM

## 2024-06-14 PROCEDURE — 76770 US EXAM ABDO BACK WALL COMP: CPT

## 2024-06-14 PROCEDURE — 84443 ASSAY THYROID STIM HORMONE: CPT

## 2024-06-14 PROCEDURE — 85025 COMPLETE CBC W/AUTO DIFF WBC: CPT

## 2024-06-19 ENCOUNTER — HOSPITAL ENCOUNTER (OUTPATIENT)
Dept: RADIOLOGY | Facility: HOSPITAL | Age: 79
Discharge: HOME | End: 2024-06-19
Payer: MEDICARE

## 2024-06-19 DIAGNOSIS — R22.1 MASS OF LEFT SIDE OF NECK: ICD-10-CM

## 2024-06-19 PROCEDURE — 70490 CT SOFT TISSUE NECK W/O DYE: CPT

## 2024-06-26 ENCOUNTER — TELEPHONE (OUTPATIENT)
Dept: PRIMARY CARE | Facility: CLINIC | Age: 79
End: 2024-06-26
Payer: MEDICARE

## 2024-06-26 NOTE — TELEPHONE ENCOUNTER
Patient is calling to report that he understands that the CT was negative. However he is stating the area is still there  and is tender to touch. Asking is he should follow up with you sooner or with a specialty

## 2024-06-27 NOTE — TELEPHONE ENCOUNTER
Patient called office and he was advised to follow up with ENT he will contact his insurance and schedule with who ever is covered on his plan .

## 2024-07-08 DIAGNOSIS — I10 HYPERTENSION, UNSPECIFIED TYPE: ICD-10-CM

## 2024-07-08 RX ORDER — AMLODIPINE BESYLATE 10 MG/1
10 TABLET ORAL DAILY
Qty: 90 TABLET | Refills: 3 | Status: SHIPPED | OUTPATIENT
Start: 2024-07-08

## 2024-08-13 ENCOUNTER — LAB (OUTPATIENT)
Dept: LAB | Facility: LAB | Age: 79
End: 2024-08-13
Payer: MEDICARE

## 2024-08-13 DIAGNOSIS — N17.9 ACUTE KIDNEY FAILURE, UNSPECIFIED (CMS-HCC): Primary | ICD-10-CM

## 2024-08-13 LAB
ALBUMIN SERPL BCP-MCNC: 4.5 G/DL (ref 3.4–5)
ANION GAP SERPL CALC-SCNC: 17 MMOL/L (ref 10–20)
BUN SERPL-MCNC: 66 MG/DL (ref 6–23)
CALCIUM SERPL-MCNC: 8 MG/DL (ref 8.6–10.3)
CHLORIDE SERPL-SCNC: 106 MMOL/L (ref 98–107)
CO2 SERPL-SCNC: 18 MMOL/L (ref 21–32)
CREAT SERPL-MCNC: 3.53 MG/DL (ref 0.5–1.3)
EGFRCR SERPLBLD CKD-EPI 2021: 17 ML/MIN/1.73M*2
GLUCOSE SERPL-MCNC: 158 MG/DL (ref 74–99)
HETEROPH AB SERPLBLD QL IA.RAPID: NEGATIVE
PHOSPHATE SERPL-MCNC: 4.1 MG/DL (ref 2.5–4.9)
POTASSIUM SERPL-SCNC: 3.8 MMOL/L (ref 3.5–5.3)
SODIUM SERPL-SCNC: 137 MMOL/L (ref 136–145)

## 2024-08-13 PROCEDURE — 86160 COMPLEMENT ANTIGEN: CPT

## 2024-08-13 PROCEDURE — 80069 RENAL FUNCTION PANEL: CPT

## 2024-08-13 PROCEDURE — 86235 NUCLEAR ANTIGEN ANTIBODY: CPT

## 2024-08-13 PROCEDURE — 84165 PROTEIN E-PHORESIS SERUM: CPT

## 2024-08-13 PROCEDURE — 83521 IG LIGHT CHAINS FREE EACH: CPT

## 2024-08-13 PROCEDURE — 86225 DNA ANTIBODY NATIVE: CPT

## 2024-08-13 PROCEDURE — 36415 COLL VENOUS BLD VENIPUNCTURE: CPT

## 2024-08-13 PROCEDURE — 86334 IMMUNOFIX E-PHORESIS SERUM: CPT

## 2024-08-13 PROCEDURE — 86038 ANTINUCLEAR ANTIBODIES: CPT

## 2024-08-13 PROCEDURE — 86036 ANCA SCREEN EACH ANTIBODY: CPT

## 2024-08-13 PROCEDURE — 86308 HETEROPHILE ANTIBODY SCREEN: CPT

## 2024-08-14 LAB
C3 SERPL-MCNC: 109 MG/DL (ref 87–200)
C4 SERPL-MCNC: 25 MG/DL (ref 10–50)
DSDNA AB SER-ACNC: 2 IU/ML
KAPPA LC SERPL-MCNC: 9.45 MG/DL (ref 0.33–1.94)
KAPPA LC/LAMBDA SER: 1.86 {RATIO} (ref 0.26–1.65)
LAMBDA LC SERPL-MCNC: 5.07 MG/DL (ref 0.57–2.63)
PROT SERPL-MCNC: 7.4 G/DL (ref 6.4–8.2)

## 2024-08-14 NOTE — ADDENDUM NOTE
Addended by: HALEY YOUSSEF on: 8/14/2024 01:25 PM     Modules accepted: Orders     Medical Student Note: Progress Note  Patient: Michelle Parikh Date: 11/19/2018   female, 62 year old  Admit Date: 11/16/2018   Attending: Joseph Garland MD    Date of service 11/19/2018    PRIMARY CARE PROVIDER:  Damaso Pablo MD   aTTENDING Physician    Joseph Garland MD  CHIEF COMPLAINT    Shortness of breath and pleuritic chest pain   HPI    Michelle Parikh is a 62 year old female, with a past medical history significant for CAD s/p CABG in 2011, atrial fibrillation on apixaban, HFpEF, HTN, CVA, asthma, ISAAC on CPAP, DM type 1 on insulin, GERD, CKD stage III, hypothyroidism, anxiety, and depression, who is hospital day 4 admitted for shortness of breath and pleuritic chest pain.    Interval history significant for an episode of chest pain yesterday while ambulating in the hallway. Describes this episode of a substernal chest pressure that radiated across her entire chest. She subsequently returned to her room and states that her chest pain resolved within 3 minutes. Had been up and ambulating in her room after that time without any recurrence of chest pain. Endorses that her shortness of breath has been improving, but intermittently flares up. States that she feels like her cough has been moving secretions in her chest, enabling her to breathe; however, she has not produced any sputum with coughing. Nausea and lower abdominal pain is ongoing and unchanged. Denies any lightheadedness or dizziness. Endorses a history of chronic migraines, but denies any recent headaches.    PAST MEDICAL & SURGICAL HISTORY    Past Medical History:   Diagnosis Date   • Anxiety    • Arthritis    • Asthma    • Atrial fibrillation (CMS/[facility])    • Wills's esophagus 11/08/2016   • Benign essential HTN    • Broken ribs 11/17/2016   • Bronchitis    • C2 cervical fracture (CMS/HCC) 2012   • Cerebral infarction (CMS/HCC) 2011    WEAKNESS LEFT SIDE      • CHF (congestive heart failure) (CMS/HCC)    • Chronic headaches    • Chronic pain    •  Coronary artery disease    • CVA (cerebral infarction)    • Depression    • Diabetes (CMS/HCC)    • Esophageal reflux    • Fibromyalgia    • Fracture     Left arm   • GERD (gastroesophageal reflux disease)    • High cholesterol    • Hypothyroidism    • Kidney calculi     Stage 3 CKD.    • Mental disorder    • Myocardial infarction (CMS/HCC)    • Neuropathy of leg    • Obesity    • Other abnormal clinical finding     arthrosc   • Pneumonia    • PONV (postoperative nausea and vomiting)    • Sinusitis, chronic    • Sleep apnea    • Type 1 diabetes mellitus (CMS/HCC)    • Urinary incontinence     controlled with Vesicare   • Urinary tract infection      Past Surgical History:   Procedure Laterality Date   • Appendectomy     • Cabg, arterial, two  2011   • Cardiac surgery     • Carpal tunnel release Bilateral    • Cataract extraction extracapsular w/ intraocular lens implantation Bilateral 2005   •  delivery+postpartum care      ; X2   •  section, classic     • Colonoscopy  2017    due in 5 years.    • Colonoscopy w biopsy  16    repeat in 5 years   • Coronary artery bypass graft      2   • Esophagogastroduodenoscopy  11/18/15    repeat in 1 year, +IM   • Esophagogastroduodenoscopy  16    repeat in 1 year, +IM   • Esophagogastroduodenoscopy  2016    Repeat in 1 year   • Esophagogastroduodenoscopy  2018    Repeat in 3 years - Dr. Rose   • Esophagogastroduodenoscopy transoral flex w/bx single or mult  2011    EGD with Bx   • Eye surgery     • Hysterectomy  2007    total   • Kidney surgery       RIGHT SIDE      • Ptca with stent  2011   • Removal gallbladder      Cholecystectomy (gallbladder)   • Rx retinopathy progressv,photocoag   x2 OD ,OS x6     Laser, Pan Retinal Photocoagulation   • Trigger finger release     • Wrist surgery       CURRENT MEDICATIONS- medication list and allergies personally reviewed in epic  Medications  Prior to Admission   Medication Sig Dispense Refill   • Multiple Vitamins-Minerals (WOMENS ONE DAILY PO)      • pramipexole (MIRAPEX) 0.25 MG tablet TAKE 1/2 TABLET BY MOUTH AT BEDTIME FOR 6 DAYS. MAY INCREASE TO 1 TABLET EVERY NIGHT AT BEDTIME IF LEG MOTION NOT IMPROVED (Patient taking differently: 0.25 mg. TAKE 1/2 TABLET BY MOUTH AT BEDTIME FOR 6 DAYS. MAY INCREASE TO 1 TABLET EVERY NIGHT AT BEDTIME IF LEG MOTION NOT IMPROVED) 30 tablet 0   • insulin glargine (LANTUS) 100 UNIT/ML injectable solution 8 units into the skin morning and 14 units into the skin at supper     • gabapentin (NEURONTIN) 300 MG capsule On last day of lyrica, start 1 cap po qd x 1 day then 1 cap po bid x 1 days then 1 po tid x 5 days, then 1 po bid and 2 po qhs 120 capsule 2   • colestipol (COLESTID) 1 g Tab 1 tab po bid 60 tablet 5   • DIAZepam (VALIUM) 10 MG tablet Take 1st tab 30 minutes prior to MRI. Take 2nd tab just prior if needed. 2 tablet 0   • fluticasone (FLONASE) 50 MCG/ACT nasal spray Spray 1 spray in each nostril daily. 16 g 12   • potassium chloride (KLOR-CON, K-TAB) 10 MEQ ER tablet 1 tab po qam 30 tablet 1   • carvedilol (COREG) 3.125 MG tablet Take 1 tablet by mouth 2 times daily. 180 tablet 1   • buPROPion (WELLBUTRIN XL) 150 MG 24 hr tablet Take 1 tablet by mouth daily. 90 tablet 1   • PARoxetine (PAXIL) 40 MG tablet Take 1 tablet by mouth daily. 90 tablet 1   • levothyroxine (SYNTHROID, LEVOTHROID) 25 MCG tablet Take 1 tablet by mouth daily. 90 tablet 1   • ALPRAZolam (XANAX) 1 MG tablet 1 tab po q12h prn anxiety 60 tablet 0   • apixaban (ELIQUIS) 5 MG Tab Take 1 tablet by mouth every 12 hours. 180 tablet 1   • Ferrous Sulfate (IRON) 325 (65 Fe) MG Tab Take by mouth daily.      • calcitonin (MIACALCIN) 200 UNIT/ACT nasal spray SPRAY ONE SPRAY IN ALTERNATING NOSTRILS DAILY 3.7 mL 2   • protriptyline (VIVACTIL) 5 MG tablet Take one tab nightly for one week; then one tab BID for one week; then two tabs in the am and one at  night; then two tabs BID. 120 tablet 1   • pantoprazole (PROTONIX) 40 MG tablet Take 1 tablet by mouth daily. 90 tablet 3   • benzocaine (ORAJEL) 10 % oral gel Apply topically 3 to 4 times daily 9 g 0   • docusate sodium (COLACE) 100 MG capsule Take 1 tablet by mouth every morning and 2 tablets every evening     • diphenhydrAMINE (BENADRYL) 25 MG tablet Take 25 mg by mouth nightly as needed for Sleep.     • ascorbic acid (VITAMIN C) 250 MG tablet Take 250 mg by mouth daily.      • hydroCORTisone (ANUSOL-HC) 2.5 % rectal cream Apply rectally bid x 1 week. 30 g 0   • biotin 10 MG tablet Take 1 tablet twice daily     • mometasone (ASMANEX 30 METERED DOSES) 220 MCG/INH inhaler Inhale 1 puff into the lungs Every evening. 1 Inhaler 11   • solifenacin (VESICARE) 10 MG tablet Take 10 mg by mouth daily.     • Riboflavin 100 MG Tab Take 1 tablet by mouth daily.      • albuterol 108 (90 BASE) MCG/ACT inhaler Inhale 2 puffs into the lungs every 4 hours as needed for Shortness of Breath or Wheezing. (Patient taking differently: Inhale 2 puffs into the lungs daily. ) 1 Inhaler 5   • insulin lispro (HUMALOG KWIKPEN) 100 UNIT/ML pen-injector 5 Units. Inject 4 units base after each meal + sliding (careful with carbs - limit 45-60 grams)  3 units for blood sugars:    6 unit for blood sugars: 111-159   7 units for blood sugars: 160-184   8 units for blood sugars: 185-209   9 units for blood sugars: 210-234   10 units for blood sugars: 235-259   11 units for blood sugars: 260- 284  12 units for blood sugars 285-309  13 units for blood sugars 310-334  14 units for blood sugars 335-359  15 units for blood sugars 360-384  16 units for blood sugars 385-408  17 units for blood sugars 409 or higher 15 mL 5   • aspirin 81 MG chewable tablet Chew 81 mg by mouth daily.     • oxybutynin (DITROPAN XL) 15 MG 24 hr tablet Take 15 mg by mouth daily.     • [DISCONTINUED] enoxaparin (LOVENOX) 100 MG/ML injectable solution Inject 1 mL into the  skin every 12 hours. 5 Syringe 1   • [DISCONTINUED] bumetanide (BUMEX) 0.5 MG tablet Take 1 tablet by mouth daily. 30 tablet 1   • [DISCONTINUED] lisinopril (ZESTRIL) 10 MG tablet Take 1 tablet by mouth daily. (Patient taking differently: Take 5 mg by mouth daily. ) 90 tablet 1   • acetone, urine, test (RELION KETONE TEST) strip 1 each by Other route.     • blood glucose (YOGESH CONTOUR NEXT TEST) test strip 1 each by Other route.     • Insulin Pen Needle (B-D U/F PEN NEEDLE \") 31G X 8 MM Misc USE FIVE TIMES DAILY     • Lancets Misc Thru dexcom distributor     • lidocaine (LIDODERM) 5 % patch Place 1 patch onto the skin every 12 hours. Remove patch 12 hours after applying 4 patch 0   • [DISCONTINUED] Insulin Glargine (LANTUS SC) Inject 15 Units into the skin 2 times daily.      • GLUCAGON EMERGENCY 1 MG injection kit INJECT 1 MG INTO THE MUSCLE ONCE FOR 1 DOSE 3 kit 3     ALLERGIES  ALLERGIES:   Allergen Reactions   • Bactrim Ds Other (See Comments)     Mouth sores    • Acetaminophen Other (See Comments)     Wears CGM Dexcom; may cause false high blood sugar readings.   • Coconut RASH   • Codeine GI UPSET   • Coconut   (Food Or Med) RASH   • Hydrocodone VOMITING     SOCIAL HISTORY    Social History     Socioeconomic History   • Marital status: /Civil Union     Spouse name: None   • Number of children: 1   • Years of education: 16   • Highest education level: None   Social Needs   • Financial resource strain: None   • Food insecurity - worry: None   • Food insecurity - inability: None   • Transportation needs - medical: None   • Transportation needs - non-medical: None   Occupational History   • Occupation: Unemployed     Comment: due to health issues   Tobacco Use   • Smoking status: Former Smoker     Packs/day: 0.10     Years: 2.00     Pack years: 0.20     Types: Cigarettes     Last attempt to quit: 1980     Years since quittin.9   • Smokeless tobacco: Former User   Substance and Sexual Activity    • Alcohol use: No     Alcohol/week: 0.0 oz   • Drug use: No     Comment: Coffee 1 cup per day   • Sexual activity: None   Other Topics Concern   •  Service No   • Blood Transfusions Yes     Comment: possibly with heart surgery   • Caffeine Concern Not Asked   • Occupational Exposure Not Asked   • Hobby Hazards Not Asked   • Sleep Concern Yes     Comment: taking Trazodone   • Stress Concern Yes   • Weight Concern Yes     Comment: working on losing weight   • Special Diet Yes     Comment: taking diet shakes, watches calorie intake   • Back Care Yes   • Exercise Yes     Comment: does exercises with home health, PT   • Bike Helmet No     Comment: doesn't bike   • Seat Belt Yes   • Self-Exams Not Asked   Social History Narrative    , P: 1, one living daughter and one daughter  at 8 days old. . 2-3 servings of dairy per day. Minimal coffee. 2 cans of soda per day. No tea. Exercises by doing PT exercises/stretches with home health.     FAMILY HISTORY    Family History   Problem Relation Age of Onset   • Cataracts Mother    • Coronary Artery Disease Mother    • Osteoarthritis Mother    • Depression Mother    • Heart disease Mother    • Hypertension Mother    • Hyperlipidemia Mother    • Heart disease Sister    • Osteoarthritis Sister    • Depression Sister    • Hypertension Sister    • Hyperlipidemia Sister    • Asthma Brother    • Osteoarthritis Brother    • Depression Brother    • Hyperlipidemia Brother    • Dementia/Alzheimers Father    • Asthma Father    • Osteoarthritis Father    • Gastrointestinal Father    • Heart disease Father    • Hypertension Father    • Hyperlipidemia Father    • Diabetes Other         maternal grandmother   • Diabetes Other         paternal grandmother   • Blindness Other         grandmother   • Diabetes Daughter    • Gastrointestinal Daughter    • Hyperlipidemia Daughter    • Kidney disease Daughter      REVIEW OF SYSTEMS    All 14 Systems were reviewed and found  to be negative except what's mentioned in HPI    PHYSICAL EXAMINATION    Vital 24 Hour Range Most Recent Value   Temperature Temp  Min: 98.1 °F (36.7 °C)  Max: 98.8 °F (37.1 °C) 98.4 °F (36.9 °C)   Pulse Pulse  Min: 66  Max: 75 75   Respiratory Resp  Min: 16  Max: 16 16   Blood Pressure BP  Min: 109/53  Max: 144/69 125/61   Pulse Oximetry SpO2  Min: 92 %  Max: 94 % 92 %     General: Patient is pleasant, well appearing, and in no acute distress  Head/ENT: Normal cephalic/atraumatic  Respiratory: lungs clear to auscultation bilaterally, breathing is non-labored, not wearing supplemental oxygen  Cardiovascular: regular rate and rhythm, no murmur, no gallops, JVP not assessed  Abdomen: abdomen is soft, obese, and with mild tenderness to palpation of the lower quadrants. Otherwise is non-distended without rebound or guarding. Bowel sounds auscultated.  Musculoskeletal: moving all extremities spontaneously  Extremities/Skin: extremities are warm and well-perfused, minimal lower extremity edema present up to the knee, R>L   Neurologic: patient is awake, alert, and oriented. Sensation to light touch intact in bilateral lower extremities.  Psych: anxious mood    I/O:  11/18/18: 1320 mL total input (830 mL PO, 490 mL IV piggyback), 2000 mL UOP (0.74 mL/kg/hour)  Since midnight: 240 mL PO, 800 mL UOP (0.79 mL/kg/hour)      Labs    Recent Labs   Lab 11/19/18  0617 11/18/18  0420 11/18/18  0350 11/17/18  0525   SODIUM 144 144  --  142   POTASSIUM 4.3 4.3  --  3.5   CHLORIDE 106 106  --  108*   CO2 31 36*  --  31   BUN 30* 25*  --  17   CREATININE 1.10* 1.16*  --  0.98*   GLUCOSE 54* 77  --  77   WBC 5.4  --  7.5 7.4   HGB 11.3*  --  10.9* 11.3*   HCT 35.4*  --  34.0* 34.4*     --  234 227      11/16/2018 04:05 11/17/2018 05:25 11/18/2018 03:50 11/19/2018 06:17   NT PROBNP 3,972 (H) 4,014 (H) 2,272 (H) 1,682 (H)      11/16/2018 04:05 11/16/2018 07:59 11/16/2018 14:18 11/16/2018 19:38   TROPONIN I 0.02 0.03 <0.02 <0.02       11/16/2018 04:05 11/17/2018 05:25 11/18/2018 04:20 11/19/2018 06:17   CRP 1.1 (H) 8.3 (H) 4.9 (H) 2.7 (H)     Results for orders placed or performed during the hospital encounter of 11/16/18   Blood Culture x 2   Result Value Ref Range    Specimen Description BLOOD HAND, RIGHT     CULTURE NO GROWTH 3 DAYS.     REPORT STATUS PENDING      Chest X-Ray  Result Date: 11/16/2018  IMPRESSION: 1. Cardiomegaly with mild central vascular congestion. 2. Bilateral perihilar and lower lung zone pulmonary infiltrates. 3. No effusion or pneumothorax.     Chest X-Ray  Result Date: 11/17/2018  IMPRESSION: Persistent mild cardiomegaly and mild changes of pulmonary venous hypertension and mild interstitial pulmonary edema are noted, consistent with congestive heart failure. Compared to prior examination these changes have substantially improved.     Echocardiogram (TTE)  11/16/18  Limited study due to poor acoustic windows  LVEF = 50%  Elevated LV filling pressure  Elevated right heart pressure as estimated by PA end diastolic velocity.    EKG  11/16/18  Normal sinus rhythm, rate 85 bpm, left axis deviation    Code Status- Full Resuscitation    Assessment and plan:  Michelle Parikh is a 62 year old female, with a past medical history significant for CAD s/p CABG in 2011, atrial fibrillation on apixaban, HFpEF, HTN, CVA, asthma, ISAAC on CPAP, DM type 1 on insulin, GERD, CKD stage III, hypothyroidism, anxiety, and depression, who is hospital day 4 admitted for shortness of breath and pleuritic chest pain.    · Acute on chronic diastolic CHF- Presented 3 days ago with shortness of breath that was exacerbated with laying down. She was found to have elevated BNP on laboratory studies and chest X-Ray findings consistent with CHF. She was further evaluated with an echocardiogram demonstrating elevated LV and RV filling pressures. Most likely etiology for CHF exacerbation is pneumonia given infiltrate noted on Chest X-Ray, elevated CRP, and  fevers. BNP remains elevated, but is down-trending, decreased to 1682 today. Shortness of breath also improving and patient is no longer requiring supplemental oxygen. Electrolytes and kidney function remain stable, although her BUN was elevated to 30 today (increased from 25 yesterday). This is most likely due to dehydration given diuresis and fluid restriction. Plan to continue lisinopril, carvedilol, and apixaban. Cardiology was consulted. On presentation, Michelle had been on 0.5 mg of bumetanide daily. While in the hospital, we have been diuresing her with IV furosemide and decreased to 20 mg of furosemide IV yesterday with consistent improvement. Plan to discontinue furosemide and discharge home on 1 mg of bumetanide daily. Advise outpatient follow up with cardiology.  · Community acquired pneumonia- CRP remains elevated, but is down-trending. Has remained afebrile with improving oxygen saturation and decreasing oxygen requirements. Blood cultures are negative to date. She has completed an adequate course of azithromycin (4 days) and 4 days of ceftriaxone. Will discontinue both azithromycin and ceftriaxone and send her home on 4-5 more days of Keflex PO. Repeat chest X-Ray in 4 weeks.  · Acute respiratory failure with hypoxia- secondary to acute on chronic CHF and pneumonia. Have been monitoring vitals which have been stable, demonstrating improvement in SpO2 with decreased oxygen requirements.  · Chest pain- EKG demonstrated possible old anterior MI. Troponins were trended and were all negative. Consulted cardiology who advised outpatient stress test.  · CAD s/p PTCA and CABG 2011- continue carvedilol, lisinopril, and bumetanide. She is currently not on a statin secondary to possible history of intolerance. Advised her to revisit this with cardiology when she follows up with them as an outpatient.  · Paroxysmal Atrial Fibrillation- anticoagulation with apixaban and rate control with carvedilol  · Type 1 Diabetes  Mellitus, Insulin Dependent- continue home insulin regimen with regular blood glucose checks  · CKD stage II/III- avoid nephrotoxic medications. Daily metabolic panels have been stable.  · Asthma- continue home medications of mometasone at bedtime, albuterol inhaler PRN  · ISAAC on CPAP- advise continued CPAP use at home; patient will look into getting a different mask fitted so that it is more comfortable.  · Neuropathy- home gabapentin, follow up with PCP regarding dose  · Hypothyroidism- levothyroxine  · GERD- pantoprazole   · History of stroke in 2012- continue apixaban   · Chronic back pain- PRN acetaminophen available as well as patient's home medications to include gabapentin  · Morbid obesity- follow up with PCP  · Mild hemoglobin drop- held ASA yesterday, no signs of GI bleed, hemoglobin improved today. May resume ASA 81 mg. Continue home ferrous sulfate.  · Anxiety/depression- temporarily decreased patient's home dose of alprazolam 1 mg PO BID PRN to 0.5 mg PO BID to avoid respiratory depression in the setting of acute respiratory failure with hypoxia. Now that she is improved, she is able to resume her baseline dose of 1 mg PO BID PRN. We have also continued her bupropion and paroxetine.  · DVT Prophylaxis- apixaban, ambulation  · Disposition- home    Carmella Wilkins, MS3 [facility]  11/19/2018  8:24 AM

## 2024-08-16 LAB
ANCA AB PATTERN SER IF-IMP: NORMAL
ANCA IGG TITR SER IF: NORMAL {TITER}

## 2024-08-20 LAB
ALBUMIN: 4.6 G/DL (ref 3.4–5)
ALPHA 1 GLOBULIN: 0.3 G/DL (ref 0.2–0.6)
ALPHA 2 GLOBULIN: 0.8 G/DL (ref 0.4–1.1)
BETA GLOBULIN: 0.7 G/DL (ref 0.5–1.2)
GAMMA GLOBULIN: 1.1 G/DL (ref 0.5–1.4)
IMMUNOFIXATION COMMENT: NORMAL
PATH REVIEW - SERUM IMMUNOFIXATION: NORMAL
PATH REVIEW-SERUM PROTEIN ELECTROPHORESIS: NORMAL
PROTEIN ELECTROPHORESIS COMMENT: NORMAL

## 2024-08-21 LAB
ANA PATTERN: ABNORMAL
ANA SER QL HEP2 SUBST: POSITIVE
ANA TITR SER IF: ABNORMAL {TITER}
CENTROMERE B AB SER-ACNC: >8 AI
CHROMATIN AB SERPL-ACNC: 0.3 AI
DSDNA AB SER-ACNC: 2 IU/ML
ENA JO1 AB SER QL IA: <0.2 AI
ENA RNP AB SER IA-ACNC: <0.2 AI
ENA SCL70 AB SER QL IA: <0.2 AI
ENA SM AB SER IA-ACNC: <0.2 AI
ENA SM+RNP AB SER QL IA: <0.2 AI
ENA SS-A AB SER IA-ACNC: 0.8 AI
ENA SS-B AB SER IA-ACNC: <0.2 AI
RIBOSOMAL P AB SER-ACNC: <0.2 AI

## 2024-09-05 ENCOUNTER — APPOINTMENT (OUTPATIENT)
Dept: PRIMARY CARE | Facility: CLINIC | Age: 79
End: 2024-09-05
Payer: MEDICARE

## 2024-09-05 VITALS
HEART RATE: 59 BPM | SYSTOLIC BLOOD PRESSURE: 138 MMHG | WEIGHT: 150.8 LBS | TEMPERATURE: 98.1 F | OXYGEN SATURATION: 97 % | HEIGHT: 67 IN | BODY MASS INDEX: 23.67 KG/M2 | DIASTOLIC BLOOD PRESSURE: 82 MMHG

## 2024-09-05 DIAGNOSIS — I10 BENIGN ESSENTIAL HYPERTENSION: ICD-10-CM

## 2024-09-05 DIAGNOSIS — K21.9 GASTROESOPHAGEAL REFLUX DISEASE WITHOUT ESOPHAGITIS: ICD-10-CM

## 2024-09-05 DIAGNOSIS — C67.9 MALIGNANT NEOPLASM OF URINARY BLADDER, UNSPECIFIED SITE (MULTI): ICD-10-CM

## 2024-09-05 DIAGNOSIS — F10.21 ALCOHOLISM IN RECOVERY (MULTI): ICD-10-CM

## 2024-09-05 DIAGNOSIS — I48.11 LONGSTANDING PERSISTENT ATRIAL FIBRILLATION (MULTI): Primary | ICD-10-CM

## 2024-09-05 DIAGNOSIS — J44.9 CHRONIC OBSTRUCTIVE PULMONARY DISEASE, UNSPECIFIED COPD TYPE (MULTI): ICD-10-CM

## 2024-09-05 DIAGNOSIS — E03.9 ACQUIRED HYPOTHYROIDISM: ICD-10-CM

## 2024-09-05 PROCEDURE — 1036F TOBACCO NON-USER: CPT | Performed by: NURSE PRACTITIONER

## 2024-09-05 PROCEDURE — 1124F ACP DISCUSS-NO DSCNMKR DOCD: CPT | Performed by: NURSE PRACTITIONER

## 2024-09-05 PROCEDURE — 1157F ADVNC CARE PLAN IN RCRD: CPT | Performed by: NURSE PRACTITIONER

## 2024-09-05 PROCEDURE — 3078F DIAST BP <80 MM HG: CPT | Performed by: NURSE PRACTITIONER

## 2024-09-05 PROCEDURE — 99213 OFFICE O/P EST LOW 20 MIN: CPT | Performed by: NURSE PRACTITIONER

## 2024-09-05 PROCEDURE — 1125F AMNT PAIN NOTED PAIN PRSNT: CPT | Performed by: NURSE PRACTITIONER

## 2024-09-05 PROCEDURE — 3075F SYST BP GE 130 - 139MM HG: CPT | Performed by: NURSE PRACTITIONER

## 2024-09-05 PROCEDURE — 1159F MED LIST DOCD IN RCRD: CPT | Performed by: NURSE PRACTITIONER

## 2024-09-05 ASSESSMENT — ENCOUNTER SYMPTOMS
NEUROLOGICAL NEGATIVE: 1
GASTROINTESTINAL NEGATIVE: 1
ACTIVITY CHANGE: 0
TREMORS: 0
DEPRESSION: 0
DIFFICULTY URINATING: 0
COUGH: 0
EYES NEGATIVE: 1
PALPITATIONS: 0
FREQUENCY: 0
ENDOCRINE NEGATIVE: 1
CHEST TIGHTNESS: 0
EYE DISCHARGE: 0
DIZZINESS: 0
HEADACHES: 0
ARTHRALGIAS: 0
NERVOUS/ANXIOUS: 0
SHORTNESS OF BREATH: 0
OCCASIONAL FEELINGS OF UNSTEADINESS: 0
EYE REDNESS: 0
WHEEZING: 0
CONSTIPATION: 0
APPETITE CHANGE: 0
ADENOPATHY: 0
DYSURIA: 0
ALLERGIC/IMMUNOLOGIC NEGATIVE: 1
CARDIOVASCULAR NEGATIVE: 1
RESPIRATORY NEGATIVE: 1
AGITATION: 0
JOINT SWELLING: 0
BRUISES/BLEEDS EASILY: 0
BLOOD IN STOOL: 0
ABDOMINAL PAIN: 0
DIARRHEA: 0
LOSS OF SENSATION IN FEET: 0
LIGHT-HEADEDNESS: 0
SORE THROAT: 0
HEMATURIA: 0

## 2024-09-05 ASSESSMENT — PATIENT HEALTH QUESTIONNAIRE - PHQ9
SUM OF ALL RESPONSES TO PHQ9 QUESTIONS 1 AND 2: 1
10. IF YOU CHECKED OFF ANY PROBLEMS, HOW DIFFICULT HAVE THESE PROBLEMS MADE IT FOR YOU TO DO YOUR WORK, TAKE CARE OF THINGS AT HOME, OR GET ALONG WITH OTHER PEOPLE: NOT DIFFICULT AT ALL
2. FEELING DOWN, DEPRESSED OR HOPELESS: SEVERAL DAYS
1. LITTLE INTEREST OR PLEASURE IN DOING THINGS: NOT AT ALL

## 2024-09-05 ASSESSMENT — LIFESTYLE VARIABLES: HOW MANY STANDARD DRINKS CONTAINING ALCOHOL DO YOU HAVE ON A TYPICAL DAY: PATIENT DOES NOT DRINK

## 2024-09-05 ASSESSMENT — PAIN SCALES - GENERAL: PAINLEVEL: 4

## 2024-09-05 NOTE — PATIENT INSTRUCTIONS
Focus on healthy eating including lean proteins and vegetables.  Avoid high carbohydrate high sugar foods and drinks.  Try to increase physical activity as tolerated.  Goal is for 160 minutes/week of physical activity.  Check blood pressure 2-3 times a week.  Call for blood pressures greater than 140/90.  Bring list of blood pressures to next visit.

## 2024-09-05 NOTE — PROGRESS NOTES
Subjective   Patient ID: Devin Fung Jr. is a 79 y.o. male who presents for Annual Exam and Follow-up.    Presents today to follow up on HTN, hypthyroidism, and GERD.  He reports he is in the process of getting scheduled for a kidney biopsy.  He is following with Dr. Holliday in neurology. He has had lab work recently.  He does not need refills at this time. He denies concerns at this time. He was recently seen by cardiology with a good report. He denies changes in any medications from cardiology perspective. He reports using medical marijuana for pain management.  He maintains his sobriety from alcohol and opioids.  He Reports he quit smoking, opoids and alcohol all at the same time I 2005.  He has maintained his sobriety since then. He reports going to AA meetings to help maintain his sobriety.      Review of Systems   Constitutional:  Negative for activity change and appetite change.   HENT:  Negative for congestion, ear pain, hearing loss and sore throat.    Eyes: Negative.  Negative for discharge, redness and visual disturbance.   Respiratory: Negative.  Negative for cough, chest tightness, shortness of breath and wheezing.    Cardiovascular: Negative.  Negative for chest pain, palpitations and leg swelling.   Gastrointestinal: Negative.  Negative for abdominal pain, blood in stool, constipation and diarrhea.   Endocrine: Negative.    Genitourinary: Negative.  Negative for difficulty urinating, dysuria, frequency, hematuria, penile discharge and testicular pain.   Musculoskeletal:  Negative for arthralgias and joint swelling.   Skin:  Negative for rash.   Allergic/Immunologic: Negative.    Neurological: Negative.  Negative for dizziness, tremors, light-headedness and headaches.   Hematological:  Negative for adenopathy. Does not bruise/bleed easily.   Psychiatric/Behavioral:  Negative for agitation. The patient is not nervous/anxious.      Objective   /82   Pulse 59   Temp 36.7 °C (98.1 °F)  "(Temporal)   Ht 1.702 m (5' 7\")   Wt 68.4 kg (150 lb 12.8 oz)   SpO2 97%   BMI 23.62 kg/m²     Physical Exam  Constitutional:       General: He is not in acute distress.     Appearance: Normal appearance.   HENT:      Head: Normocephalic.   Eyes:      Conjunctiva/sclera: Conjunctivae normal.   Neck:      Vascular: Normal carotid pulses. No carotid bruit.   Cardiovascular:      Rate and Rhythm: Normal rate and regular rhythm.      Heart sounds: Normal heart sounds.   Pulmonary:      Effort: Pulmonary effort is normal.      Breath sounds: Normal breath sounds.   Abdominal:      General: Bowel sounds are normal.      Palpations: Abdomen is soft.   Musculoskeletal:         General: Normal range of motion.      Cervical back: No crepitus. No pain with movement.   Skin:     General: Skin is warm and dry.      Capillary Refill: Capillary refill takes less than 2 seconds.   Neurological:      Mental Status: He is alert and oriented to person, place, and time.   Psychiatric:         Mood and Affect: Mood normal.         Speech: Speech normal.     Assessment/Plan   Problem List Items Addressed This Visit             ICD-10-CM    Acquired hypothyroidism E03.9     On levothyroxine         Relevant Orders    Follow Up In Primary Care - Established    Atrial fibrillation (Multi) - Primary I48.91     On Jantoven         Benign essential hypertension I10     Controlled with amlodipine and chlorthalidone         Bladder cancer (Multi) C67.9    Relevant Orders    Follow Up In Primary Care - Established    Gastroesophageal reflux disease without esophagitis K21.9    Relevant Orders    Follow Up In Primary Care - Established    Chronic obstructive pulmonary disease, unspecified COPD type (Multi) J44.9    Relevant Orders    Follow Up In Primary Care - Established   Focus on healthy eating including lean proteins and vegetables.  Avoid high carbohydrate high sugar foods and drinks.  Try to increase physical activity as tolerated.  " Goal is for 160 minutes/week of physical activity.  Check blood pressure 2-3 times a week.  Call for blood pressures greater than 140/90.  Bring list of blood pressures to next visit.

## 2024-09-12 NOTE — PROGRESS NOTES
[facility] -- Progress Note    Rex Ndiaye   51 year old male   1972   [id number]     ________________________________________________________________  SUMMARY OF CURRENT THOUGHTS:      IMPRESSIONS:    1) 51 year-old man with multiple medical problems, including a complicated past Renal/Urological History, admitted on 9/6/24 for Chronic Abdominal Pain.  2) No fevers or leukocytosis.  3) UA here not c/w UTI. He self-catheterizes. His renal function may be worsening again.   4) Urology does not feel his pain is urologic in nature.  5) GI suspects his pain may be due to chronic constipation. He underwent an EGD and Colonoscopy which showed no obvious explanation for his symptoms.  6) All cultures here thus far negative.  7) He is requesting a Pain Management consultation.  8) History of multiple past medical problems and comorbidities including:  CKD, CHF, DM, HTN, PN, Neuropathic Bladder, BPH, Need for Self-Catheterization, Recent Empiric Treatment for Epididymoorchitis, Recent Tx for Presumed VRE UTI, Chronic Urinary Diverticulum, Chronic Pain, Axiety/Depression, Chronic Opioid Use, Hypoalbuminemia, HL, and Anemia.    PLAN:    1) It does not appear that Rex is currently infected or needs any antimicrobial therapy added on board at this time.  2) Would continue to monitor his symptoms, temps, WBCs, other labs as indicated, and physical exams expectantly.  3) As per GI, Urology, Nephrology, and as per [facility].  4) Consider referral to Pain Managment either in-, or out-patient, if warranted.    ID will be available peripherally for any future questions or concerns.      Further recommendations are to follow  Thank you.  __________________________________________________________________  Above entries and notes reviewed.    New issues/complaints - Objective:  Subjective     Nuno reports ongoing complaints. Today, these are comprised mainly of \"burning\" pain in his mouth and throat which he  EDPD Note: Rapid Result Review    Reviewed Mr. Devin Fung Jr. 's chart regarding a positive urine culture/result that was taken during their recent emergency room visit. The patient was not told about these results prior to leaving the emergency department. Therefore, patient was contacted and given proper education.     Patient presented to the ED for evaluation of abnormal labs. He did not endorse urinary symptoms in the ED and was not discharged with antibiotics.   He reports that he doesn't have a bladder or prostrate, so he has an ostomy bag from where the urine culture was collected. He has constant pain with back due to CKD. He just finished a course of antibiotics 2 weeks ago. He knows when he gets a UTI (weakness, increased back pain, etc) and did feel he was having an infection. He has a standing antibiotic script (14 day course of amoxicillin) for his recurrent UTIs. He reports that upon taking the antibiotics, he feels better within a few days but completes the full course as instructed. He does have an ID specialist he hasn't seen in a long time. Recommended for patient to follow-up with his ID specialist again for an annual visit.      03/06/24 11:52   URINE CULTURE Multiple organisms present, probable contamination. Repeat culture if clinically indicated. !   !: Data is abnormal  Rpt: View report in Results Review for more information      No further follow up needed from EDPD Team.     Nena Alvarado       says feels \"swollen\" and \"it feels like ulcers.\"    He had essentially non-diagnostic EGD and colonoscopies done over the past few days. He has had no fevers, stable hemodynamics, and a normal WBC count. His Hgb is 8.8. His renal function remains chronically impaired and today his creatinine is back up a bit to 2.51. He claims to be straight-catheterizing himself \"six times a day\" and reports the urine to be clear and yellow in color.    He is asking for Norco and wants to see a Pain Specialist because he is afraid that once he is discharged he will not be given an Rx for Norco again because he \"got a red flag the last time.\"    He remains in-house being monitored off all antimicrobials.      MEDS:    As per the list, including:  Scheduled Meds:  Current Facility-Administered Medications   Medication Dose Route Frequency Provider Last Rate Last Admin    furosemide (LASIX) tablet 60 mg  60 mg Oral Daily Yao Matute MD   60 mg at 09/12/24 1038    polyethylene glycol (MIRALAX) packet 17 g  17 g Oral BID Jeff Parry MD   17 g at 09/11/24 2011    [Held by provider] insulin lispro (ADMELOG,HumaLOG) - Correction Dose   Subcutaneous Nightly Madonna Oconnor CNP        insulin lispro (ADMELOG,HumaLOG) - Correction Dose   Subcutaneous TID WC Madonna Oconnor CNP   1 Units at 09/12/24 1300    heparin (porcine) injection 5,000 Units  5,000 Units Subcutaneous 3 times per day Madonna Oconnor CNP   5,000 Units at 09/12/24 0627    Potassium Standard Replacement Protocol (Levels 3.5 and lower)   Does not apply See Admin Instructions Madonna Oconnor CNP        Magnesium Standard Replacement Protocol   Does not apply See Admin Instructions Madonna Oconnor CNP        Phosphorus Standard Replacement Protocol   Does not apply See Admin Instructions Madonna Oconnor CNP        sodium chloride 0.9 % injection 2 mL  2 mL Intracatheter 2 times per day Madonna Oconnor CNP   2 mL at 09/12/24 1046     atorvastatin (LIPITOR) tablet 10 mg  10 mg Oral Daily Madonna Oconnor CNP   10 mg at 09/12/24 1038    carvedilol (COREG) tablet 12.5 mg  12.5 mg Oral 2 times per day Madonna Oconnor CNP   12.5 mg at 09/12/24 1038    gabapentin (NEURONTIN) capsule 300 mg  300 mg Oral QHS Madonna Oconnor, CNP   300 mg at 09/11/24 2011    insulin glargine (LANTUS) injection 10 Units  10 Units Subcutaneous 2 times per day Madonna Oconnor, CNP   10 Units at 09/12/24 1041    NIFEdipine XL (PROCARDIA XL) ER tablet 30 mg  30 mg Oral Daily Madonna Oconnor CNP   30 mg at 09/12/24 1038    pantoprazole (PROTONIX) EC tablet 40 mg  40 mg Oral QAM AC Madonna Oconnor CNP   40 mg at 09/12/24 0627    tamsulosin (FLOMAX) capsule 0.4 mg  0.4 mg Oral QHS Madonna Oconnor CNP   0.4 mg at 09/11/24 2011    nystatin (MYCOSTATIN) [id number] UNIT/ML suspension 500,000 Units  500,000 Units Swish & Swallow 4x Daily Madonna Oconnor CNP   500,000 Units at 09/12/24 1315    sucralfate (CARAFATE) tablet 1 g  1 g Oral 4x Daily AC & HS Madonna Oconnor, CNP   1 g at 09/12/24 1038     PRN Meds:  Current Facility-Administered Medications   Medication Dose Route Frequency Provider Last Rate Last Admin    HYDROmorphone (DILAUDID) injection 0.2 mg  0.2 mg Intravenous Q4H PRN Karen Treadwell CNP        hydrALAZINE (APRESOLINE) injection 10 mg  10 mg Intravenous Q6H PRN Madonna Oconnor CNP   10 mg at 09/10/24 1039    dextrose 50 % injection 25 g  25 g Intravenous PRN Madonna Oconnor CNP        dextrose 50 % injection 12.5 g  12.5 g Intravenous PRN Madonna Oconnor CNP   12.5 g at 09/10/24 1034    glucagon (GLUCAGEN) injection 1 mg  1 mg Intramuscular PRN Madonna Oconnor CNP   1 mg at 09/09/24 1140    dextrose (GLUTOSE) 40 % gel 15 g  15 g Oral PRN Madonna Oconnor CNP        dextrose (GLUTOSE) 40 % gel 30 g  30 g Oral PRN Madonna Oconnor CNP        acetaminophen (TYLENOL) tablet 650 mg  650 mg Oral Q4H PRN  Madonna Oconnor CNP        Or    acetaminophen (TYLENOL) suppository 650 mg  650 mg Rectal Q4H PRN Madonna Oconnor CNP        prochlorperazine (COMPAZINE) tablet 5 mg  5 mg Oral Q4H PRN Madonna Oconnor CNP        Or    prochlorperazine (COMPAZINE) injection 5 mg  5 mg Intravenous Q4H PRN Madonna Oconnor CNP   5 mg at 09/12/24 1258    sodium chloride 0.9 % flush bag 25 mL  25 mL Intravenous PRN Madonna Oconnor CNP        clonazePAM (KlonoPIN) tablet 0.5 mg  0.5 mg Oral Daily PRN Madonna Oconnor CNP   0.5 mg at 09/11/24 2316    zolpidem (AMBIEN) tablet 5 mg  5 mg Oral Nightly PRN Madonna Oconnor CNP        escitalopram (LEXAPRO) tablet 10 mg  10 mg Oral Daily PRN Madonna Oconnor CNP        HYDROcodone-acetaminophen (NORCO) 5-325 MG per tablet 1 tablet  1 tablet Oral Q4H PRN Madonna Oconnor CNP   1 tablet at 09/11/24 0054    HYDROcodone-acetaminophen (NORCO)  MG per tablet 1 tablet  1 tablet Oral Q6H PRN Madonna Oconnor CNP   1 tablet at 09/12/24 1258         PHYSICAL EXAM  Last Recorded Vitals  Blood pressure (!) 164/96, pulse 78, temperature 98.2 °F (36.8 °C), temperature source Oral, resp. rate 18, height 5' 11\" (1.803 m), weight 89.3 kg (196 lb 13.9 oz), SpO2 97%.  Body mass index is 27.46 kg/m².  Physical Exam    Afebrile, VSS as charted.   Awake, alert, very non-toxic appearing.  HENT:  EOMI. MMM. No icterus. No thrush whatsoever. Neck supple.  Lungs:  Clear. Somewhat decreased bibasilar AE. Good O2 sats on RA.  COR:  RRR. QTc calculated at 409 based on an EKG from 9/6/24.  Abd:  Slightly protuberant, soft, diffuse mostly lower quadrant subjective tenderness, no GRR, NABS.  :  No redness of the genitalia or scrotum. No scrotal edema. The testicles are normal, not swollen, non-tender, and without induration of fluctuance. No inguinal LA. Straight catheterizing himself here. Refusing Riley.  Extr:  Generalized edema and puffiness. Mild-moderate distal BLE  edema. MAEW weakly. Feet warm.   Skin:  No rashes or jaudice.  Neuro:  Well-oriented. Anxious. Non-focal motor exam. He doesn't seem to have very good insight into his medical conditions.  Lines:  No central lines.    REVIEW OF LABORATORY, PATHOLOGY, AND RADIOLOGY DATA:    Labs   Recent Labs   Lab 09/12/24  0455 09/11/24  1136 09/10/24  0416 09/08/24  0704 09/07/24  0713 09/06/24  2217   SODIUM 140 140 138   < > 137 139   CHLORIDE 114* 112* 110   < > 109 110   CO2 24 25 27   < > 25 26   BUN 28* 24* 25*   < > 30* 35*   CREATININE 2.51* 2.35* 2.10*   < > 2.01* 2.07*   GLUCOSE 70 128* 157*   < > 222* 228*   CALCIUM 8.3* 8.1* 7.7*   < > 8.0* 8.3*   ALBUMIN  --  1.3* 1.2*  --  1.3* 1.5*   AST  --   --   --   --  23 28   BILIRUBIN  --   --   --   --  <0.1* 0.1*   ALKPT  --   --   --   --  70 75   GPT  --   --   --   --  25 27    < > = values in this interval not displayed.      Recent Labs   Lab 09/12/24 0455   WBC 8.0   RBC 2.90*   HGB 8.8*   HCT 27.1*         No results available in last 24 hours      Imaging    Colonoscopy   Final Result      Esophagogastroduodenoscopy (EGD)   Final Result      CT ABDOMEN PELVIS W CONTRAST   Final Result   Small bilateral pleural effusions, slightly increased in size.      Small volume ascites fluid, similar to the prior.      Additional findings as above.            Electronically Signed by: IBIS SMALLS M.D.    Signed on: 9/7/2024 12:20 AM    Workstation ID: [id number]          ____________________________________________________________________  Please note:   If you are looking for the assessment and plan, it has been moved to the top of this note for ease of viewing and to avoid scrolling.      Further recommendations are to follow  Thank you.      Steven Puente MD

## 2024-10-26 DIAGNOSIS — Z00.00 HEALTHCARE MAINTENANCE: ICD-10-CM

## 2024-10-28 RX ORDER — SODIUM BICARBONATE 650 MG/1
TABLET ORAL
Qty: 270 TABLET | Refills: 3 | Status: SHIPPED | OUTPATIENT
Start: 2024-10-28

## 2024-10-28 RX ORDER — LISINOPRIL 40 MG/1
40 TABLET ORAL DAILY
Qty: 90 TABLET | Refills: 3 | Status: SHIPPED | OUTPATIENT
Start: 2024-10-28

## 2024-11-07 ENCOUNTER — APPOINTMENT (OUTPATIENT)
Dept: UROLOGY | Facility: CLINIC | Age: 79
End: 2024-11-07
Payer: MEDICARE

## 2024-11-07 ENCOUNTER — HOSPITAL ENCOUNTER (OUTPATIENT)
Dept: RADIOLOGY | Facility: HOSPITAL | Age: 79
Discharge: HOME | End: 2024-11-07
Payer: MEDICARE

## 2024-11-07 DIAGNOSIS — C67.9 MALIGNANT NEOPLASM OF URINARY BLADDER, UNSPECIFIED SITE (MULTI): Primary | ICD-10-CM

## 2024-11-07 DIAGNOSIS — C67.9 MALIGNANT NEOPLASM OF URINARY BLADDER, UNSPECIFIED SITE (MULTI): ICD-10-CM

## 2024-11-07 PROCEDURE — 74176 CT ABD & PELVIS W/O CONTRAST: CPT

## 2024-11-07 PROCEDURE — A9698 NON-RAD CONTRAST MATERIALNOC: HCPCS | Performed by: STUDENT IN AN ORGANIZED HEALTH CARE EDUCATION/TRAINING PROGRAM

## 2024-11-07 PROCEDURE — 2550000001 HC RX 255 CONTRASTS: Performed by: STUDENT IN AN ORGANIZED HEALTH CARE EDUCATION/TRAINING PROGRAM

## 2024-11-11 NOTE — PROGRESS NOTES
Provider Impressions     This is a pleasant 79 year old right handed male with PMH of bladder cancer, hypothyroidism, A- fib, GERD, HTN, CAD, Hepatitis C, depression, CKD3 who presents for follow-up of chronic low for back pain. Physical exam is positive for weakness in left foot ankle evertors dorsiflexion and mild hyperreflexia. Symptoms and physical exam findings in this complex patient are of unclear etiology at this time, but differential includes L5 radiculopathy lumbar and/or cervical spine stenosis, and given history of transverse myelitis and bladder cancer, syrinx and metastases respectively are on the differential. Isolated peroneal neuropathy is also on the differential.        - If you decide that you want to, start gabapentin 100 mg at night for a few days, once you feel fine then go up to 200 mg, and then eventually to 300 mg at night for now.  -Consider other medications in the future  -Continue physical therapy and daily home exercises, this should be dynamic and focusing on active strengthening.  -Consider injections in the future: Trigger point injections (handout provided previously), ultrasound-guided SI joint injections, referral for spine injections  -Consider EMG in the future  -Consider referral to surgical spine in the future  -Follow-up 6 months or sooner if needed, red flags discussed        The patient expressed understanding and agreement with the assessment and plan. Patient encouraged to contact us should they have any questions, concerns, or any changes in symptoms.        Thank you for allowing me to participate in the care of your patient.     ** Dictated with voice recognition software, please forgive any errors in grammar and/or spelling **      Chief Complaint     Lower back and bilateral leg pain follow-up     History of Present Illness    This is a pleasant 79 year old right handed male with PMH of bladder cancer (in remission treated with bladder resection),  hypothyroidism, A- fib, GERD, HTN, CAD, Hepatitis C, depression, CKD3 who presents for follow-up of chronic back pain.      He was last seen here on 5/7/2023, at which point I advised him to consider starting gabapentin at night if needed. He never started it. Didn't feel the need.    Advised to continue physical therapy and daily home exercises. This has been helping significantly.  He is doing PT twice a week.    Red flags discussed with him.  He denies all of them today aside from the known left foot weakness.   This has improved significantly according to the patient. He denies any red flags.    He rates his pain as 4/10, previously 5/10. Mostly low back, PT helps, not interested in meds or injections for this at this time. TENS unit helps.     Otherwise, there have been no changes to his medications or past medical history since last visit     ______________________________  9/1/2023: MRIs reviewed, try gabapentin if you want, consider injections, physical therapy referral provided, consider referral to surgery   11/7/23: Continue physical therapy, home exercises, consider gabapentin other medications, consider referral to surgery, follow-up 6 months  5/7/2024:  Continue physical therapy, home exercises, consider gabapentin other medications, consider referral to surgery, follow-up 6 months  11/12/2024: Continue physical therapy, home exercises, consider gabapentin other medications, consider referral to surgery, follow-up 6 months  _____________________________  As a reminder:     TIMELINE OF COMPLAINT(S):  Pain started over 30 years ago, maybe a couple of months after a fall in the [employer] [place] while working there. Started in low back. Getting worse in frequency. Standing up with arms forward makes it worse. Sitting makes better. Occasionally goes down posterior legs to ankles, switches between sides. About 3 times/ week. Pain in back is constant. Low back feels throbbing. Left side is worse. Was told he has  neuropathy in feet. Feels pain shooting down when fatigued.      Did have a fall about 2 months ago into shower, was sick for a few weeks and didn't feel good and passed out , landed on both anterior knees, passed out 10 min, - has had problem with left foot slapping ground since then. Pain not worse since fall in the shower. Falling or almost falling more lately.     Did have transverse myelitis in his 20s- was paralyzed waist down for 9 months. Was at T10. Recovered completely except some sensation changes from waist down.      Needs stool softeners daily since bladder removal 2 years ago. no incontinence, no saddle anesthesia.      Pain:  LOCATION- Lower back and bilateral leg  RADIATION-  ASSOCIATED WITH- Swelling  NUMBNESS/TINGLING- Yes, fingers  WEAKNESS- Yes  CONSTANT or INTERMITTENT- Intermittent  SEVERITY/QUANTITY- 5  QUALITY- Achy, throbbing  EXACERBATED BY- Bending over  BETTER WITH- Sitting down  TRIED- Acetaminophen- does help, Oxycodone- does not take that, medical marijuana    Anti-Inflammatories: (has CKD) Aleve- cannot take NSAIDS anymore   Muscle relaxants:   Anti-depressants:    Neuroleptics: previously Gabapentin- doesn't remember but thinks something was wrong    LDN:        PHYSICAL THERAPY: Has not had any for back- referred in April?  TENS unit: Yes- helps temporarily   CHIROPRACTIC MANIPULATION:Yes- has not been in >10 years   ACUPUNCTURE TREATMENTS: Yes- helped at the time   DEEP TISSUE MASSAGE THERAPY: No  OSTEOPATHIC MANIPULATION THERAPY: No     INJECTIONS: Yes  - Had nerve blocks done in back ( now retired)- helped for a few days     EMG/NCS: Yes, lower  Through [facility]- doesn't remember what it showed , many years ago     IMAGING: No lumbar images     MRI Cervical and Thoracic Spine 2/19/21: Cervical spondylosis, worse as compared to prior MRI from 2009. Multilevel thoracic degenerative changes most significantly causing mild- moderate central canal stenosis at T10-11.      I ordered  lumbar, thoracic, and cervical MRIs and these were done on 8/24/2023. Lumbar MRI showed multilevel spondylosis with severe bilateral foraminal stenosis at L5-S1 due to a central and right paracentral herniated nucleus pulposus, severe bilateral foraminal narrowing at L4-5 and L3-4. No severe central canal stenosis. Thoracic MRI showed multilevel spondylosis without any severe canal or foraminal narrowing. Unchanged from previous MRI. There is fusion of the vertebral bodies with normal alignment. Cervical MRI showed multilevel cervical spondylosis without significant central canal or foraminal narrowing. No cord compression.    CT C/A/P 3/29/23: No definitive new metastatic disease. No focal suspicious skeletal lesions. Degenerative changes of the spine.      CT Cervical Spine 9/18/18: There is mild narrowing of C2-3 disc space. Prominent narrowing of C 4-5, C5-6, and C6-7 disc spaces noted. Grade 1 anterolisthesis of C3 and C4 is seen. Uncovertebral joint hypertrophy is noted. Disc bulge at C3-4 level. Small disc bulges seen at C5-6.         FUNCTIONAL HISTORY: The patient is independent in all ADLs, mobility, and driving. The patient does not use any assistive device.     SH:  Lives in: [place]  Lives with: Wife  Occupation: Maintenance  Tobacco: No  Alcohol: No  Drugs: Medical Marijuana        _______________________  ROS: The patient denies any bowel or bladder incontinence/accidents, night sweats, fevers, chills, recent significant weight loss. A 14 point review of systems was reviewed with the patient and is as above and otherwise negative.,  ROS Questionnaire positive for pain, back pain, muscle pain/tightness      Physical Exam  GEN - Alert, well-developed, thin, no acute distress  PSYCH - Cooperative, appropriate mood and affect  HEENT - NC/AT  RESP - Non-labored respirations, equal expansion  CV - warm and well-perfused, No cyanosis, there was mild edema in extremities.   ABD- soft, ND  SKIN - No visible  rash.     Previous  BACK/SPINE - Symmetric posture, no erythema, edema, or swelling. Mild bilateral low back pain with lumbar flexion, extension, rotation, and side bend. No pain with facet loading. No tenderness of lumbosacral spinous processes. Mild tenderness over the bilateral lumbar paraspinal muscles. No tenderness over the iliolumbar ligaments bilaterally. No TTP of bilateral PSIS, sacral sulcus. Some tenderness over gluteus medius, piriformis. Straight leg raise negative bilaterally. Sitting slump test negative bilaterally. Femoral stretch test negative bilaterally.        HIPS/PELVIS - Symmetric in standing and lying Passive hip flexion, internal rotation, and external rotation within functional normal limits bilaterally with provocation of low back pain symptoms. No tenderness over iliopsoas tendon. Positive FABERs bilaterally. Negative hip clicking Negative resisted active SLR. Back pain with deep hip flexion        NEURO -  Weakness dorsiflex left foot, 3/5, 1/5 ankle eversion, EHL, 5/5 ankle plantarflexion, inversion,  5-/5 knee flexion left   5 -/5 bilateral hip flexion  5-/5 right ankle dorsiflexion  5 -/5 ADM and FDI on the right  All other muscles 5/5  Somewhat brisk reflexes more so on the right, diminished Achilles reflexes bilaterally. Normal upper extremity reflexes, negative Ilir's  Sensation intact to light touch bilateral lower extremities  GAIT - Normal base, normal stride length, hunched over, some foot slapping on left side

## 2024-11-12 ENCOUNTER — APPOINTMENT (OUTPATIENT)
Dept: PHYSICAL MEDICINE AND REHAB | Facility: CLINIC | Age: 79
End: 2024-11-12
Payer: MEDICARE

## 2024-11-12 VITALS
DIASTOLIC BLOOD PRESSURE: 88 MMHG | TEMPERATURE: 98.4 F | HEART RATE: 80 BPM | OXYGEN SATURATION: 96 % | SYSTOLIC BLOOD PRESSURE: 170 MMHG | WEIGHT: 154 LBS | BODY MASS INDEX: 24.17 KG/M2 | HEIGHT: 67 IN

## 2024-11-12 DIAGNOSIS — R26.89 BALANCE DISORDER: ICD-10-CM

## 2024-11-12 DIAGNOSIS — M47.812 SPONDYLOSIS OF CERVICAL REGION WITHOUT MYELOPATHY OR RADICULOPATHY: ICD-10-CM

## 2024-11-12 DIAGNOSIS — M54.16 LUMBAR RADICULOPATHY: ICD-10-CM

## 2024-11-12 DIAGNOSIS — G37.3 TRANSVERSE MYELITIS (MULTI): ICD-10-CM

## 2024-11-12 DIAGNOSIS — R53.1 WEAKNESS: ICD-10-CM

## 2024-11-12 DIAGNOSIS — M48.062 SPINAL STENOSIS, LUMBAR REGION WITH NEUROGENIC CLAUDICATION: ICD-10-CM

## 2024-11-12 DIAGNOSIS — M21.372 LEFT FOOT DROP: Primary | ICD-10-CM

## 2024-11-12 PROCEDURE — 1159F MED LIST DOCD IN RCRD: CPT | Performed by: PHYSICAL MEDICINE & REHABILITATION

## 2024-11-12 PROCEDURE — 1160F RVW MEDS BY RX/DR IN RCRD: CPT | Performed by: PHYSICAL MEDICINE & REHABILITATION

## 2024-11-12 PROCEDURE — 3079F DIAST BP 80-89 MM HG: CPT | Performed by: PHYSICAL MEDICINE & REHABILITATION

## 2024-11-12 PROCEDURE — 3077F SYST BP >= 140 MM HG: CPT | Performed by: PHYSICAL MEDICINE & REHABILITATION

## 2024-11-12 PROCEDURE — 1125F AMNT PAIN NOTED PAIN PRSNT: CPT | Performed by: PHYSICAL MEDICINE & REHABILITATION

## 2024-11-12 PROCEDURE — 99213 OFFICE O/P EST LOW 20 MIN: CPT | Performed by: PHYSICAL MEDICINE & REHABILITATION

## 2024-11-12 PROCEDURE — 1157F ADVNC CARE PLAN IN RCRD: CPT | Performed by: PHYSICAL MEDICINE & REHABILITATION

## 2024-11-12 ASSESSMENT — PAIN SCALES - GENERAL: PAINLEVEL_OUTOF10: 4

## 2024-11-18 ENCOUNTER — APPOINTMENT (OUTPATIENT)
Dept: UROLOGY | Facility: CLINIC | Age: 79
End: 2024-11-18
Payer: MEDICARE

## 2024-11-18 DIAGNOSIS — C67.9 MALIGNANT NEOPLASM OF URINARY BLADDER, UNSPECIFIED SITE (MULTI): Primary | ICD-10-CM

## 2024-11-18 DIAGNOSIS — C61 PROSTATE CANCER (MULTI): ICD-10-CM

## 2024-11-18 PROCEDURE — 99213 OFFICE O/P EST LOW 20 MIN: CPT | Performed by: STUDENT IN AN ORGANIZED HEALTH CARE EDUCATION/TRAINING PROGRAM

## 2024-11-18 PROCEDURE — G2211 COMPLEX E/M VISIT ADD ON: HCPCS | Performed by: STUDENT IN AN ORGANIZED HEALTH CARE EDUCATION/TRAINING PROGRAM

## 2024-11-18 PROCEDURE — 1157F ADVNC CARE PLAN IN RCRD: CPT | Performed by: STUDENT IN AN ORGANIZED HEALTH CARE EDUCATION/TRAINING PROGRAM

## 2024-11-18 NOTE — PROGRESS NOTES
HPI:  Procedure (8/2021): RC+IC   Path: JAQUELINE, GG1 prostate cancer     Procedure (4/2021): TURBT   Path: HgT1     Devin Fung Jr. is a 79 y.o. male with bladder cancer, s/p RC+IC. CT CAP (3/14/22) showed stable emphysematous changes of the lungs with a nonspecific 5 mm ground-glass nodule within the right lower lobe which may represent focal atelectasis, postsurgical changes from cystoprostatectomy with ileal conduit without interval abnormality, previously noted probable extraperitoneal hematoma is markedly decreased in size. CT CAP (9/14/22) showed stable subcentimeter pulmonary nodule, largest within the right lower lobe measuring up to 5 mm; no noncontrast evidence of new subdiaphragmatic metastatic disease; hyperdense lesion of the left kidney midpole measuring up to 2.2 cm, indeterminate, most likely representing a Bosniak 2 lesion; similar appearing bilateral renal cysts. CT CAP (3/29/23) showed new punctate clustered nodules in the right upper lobe as well as a few new nodules in the left lower lobe, overall the appearance favors an infectious or inflammatory process over metastatic disease, otherwise no convincing findings of new metastatic disease of the thorax, no definite new metastatic disease on noncontrast exam, new symmetric mild dilatation of the ureters without additional findings to indicate ureteral obstruction. Recently underwent hernia repair on (12/27/22). CT CAP (10/23/23) showed no definite evidence of locoregional recurrence or metastatic disease in the chest, abdomen, or pelvis, new nonspecific 4 mm nodular opacity in the left lower lobe. CT CAP (11/7/24): no significant interval change s/p cystoprostatectomy with a right lower quadrant ileal conduit creation, no definite sites of new metastatic disease. Doing well.      PSA undetectable (12/17/21)  Cre: 3.79 (9/4/24), 2.83 (3/29/23), 2.78 (8/29/22), 2.33 (3/14/22), 1.70 (12/17/21), 1.63 (11/2021)     CT CAP (10/7/21): No acute  abnormality. Postsurgical changes from cysto prostatectomy, without evidence of hydronephrosis. The previously seen extraperitoneal fluid collection centered in the prostate fossa has significantly decreased in size and demonstrates slightly greater density than simple fluid, with its major component however still measuring 6.3 x 7.3 x 4.2 cm. There is no associated gas on the current examination. There are no other collections identified. This may reflect an extraperitoneal hematoma, superimposed infection is difficult to exclude.     CT CAP wo contrast (3/14/22): Stable emphysematous changes of the lungs with a nonspecific 5 mm ground-glass nodule within the right lower lobe which may represent focal atelectasis. Please give attention on follow-up examination. Postsurgical changes from cystoprostatectomy with ileal conduit without interval abnormality. Previously noted probable extraperitoneal hematoma is markedly decreased in size. New parastomal fat containing hernia.      CT CAP wo contrast (9/14/22): Stable subcentimeter pulmonary nodule since comparison CT imaging 03/14/2022, largest within the right lower lobe measuring up to 5 mm. Remaining intrathoracic findings appear stable since comparison CT imaging 03/14/2022. Postsurgical changes from cystoprostatectomy as previously seen. No noncontrast evidence of new subdiaphragmatic metastatic disease. Hyperdense lesion of the left kidney midpole measuring up to 2.2 cm, indeterminate, however most likely representing a Bosniak 2 lesion. Additional similar appearing bilateral renal cysts. Remainder findings appear stable since comparison CT imaging 03/14/2022.     CT CAP (3/29/23): new punctate clustered nodules in the right upper lobe as well as a few new nodules in the left lower lobe, overall the appearance favors an infectious or inflammatory process over metastatic disease, otherwise no convincing findings of new metastatic disease of the thorax, no definite  new metastatic disease on noncontrast exam, new symmetric mild dilatation of the ureters without additional findings to indicate ureteral obstruction     CT CAP (10/23/23): no definite evidence of locoregional recurrence or metastatic disease in the chest, abdomen, or pelvis, new nonspecific 4 mm nodular opacity in the left lower lobe, the additional smaller scattered waxing and waning punctate nodules in both lungs which are favored of infectious/inflammatory etiology.    CT CAP (11/7/24): no significant interval change s/p cystoprostatectomy with a right lower quadrant ileal conduit creation, no definite sites of new metastatic disease.    Review of Systems:  All systems reviewed. Anything negative noted in the HPI.    Physical Exam:  Virtual visit.     Assessment/Plan   Devin Fung Jr. is a 79 y.o. male with bladder cancer, s/p RC+IC. CT CAP (3/14/22) showed stable emphysematous changes of the lungs with a nonspecific 5 mm ground-glass nodule within the right lower lobe which may represent focal atelectasis, postsurgical changes from cystoprostatectomy with ileal conduit without interval abnormality, previously noted probable extraperitoneal hematoma is markedly decreased in size. CT CAP (9/14/22) showed stable subcentimeter pulmonary nodule, largest within the right lower lobe measuring up to 5 mm; no noncontrast evidence of new subdiaphragmatic metastatic disease; hyperdense lesion of the left kidney midpole measuring up to 2.2 cm, indeterminate, most likely representing a Bosniak 2 lesion; similar appearing bilateral renal cysts. CT CAP (3/29/23) showed new punctate clustered nodules in the right upper lobe as well as a few new nodules in the left lower lobe, overall the appearance favors an infectious or inflammatory process over metastatic disease, otherwise no convincing findings of new metastatic disease of the thorax, no definite new metastatic disease on noncontrast exam, new symmetric mild  dilatation of the ureters without additional findings to indicate ureteral obstruction. Recently underwent hernia repair on (12/27/22). CT CAP (10/23/23) showed no definite evidence of locoregional recurrence or metastatic disease in the chest, abdomen, or pelvis, new nonspecific 4 mm nodular opacity in the left lower lobe. CT CAP (11/7/24): no significant interval change s/p cystoprostatectomy with a right lower quadrant ileal conduit creation, no definite sites of new metastatic disease. Doing well. Management options including risks, benefits and alternatives discussed at length and all questions answered. Patient prefers to proceed with follow-up in 1 year with CT CAP and PSA.           Scriblynette Attestation  By signing my name below, IKatja Scribe   attest that this documentation has been prepared under the direction and in the presence of Eugene Lawson MD.

## 2025-03-06 ENCOUNTER — APPOINTMENT (OUTPATIENT)
Dept: PRIMARY CARE | Facility: CLINIC | Age: 80
End: 2025-03-06
Payer: MEDICARE

## 2025-03-06 VITALS
HEART RATE: 76 BPM | HEIGHT: 67 IN | TEMPERATURE: 98 F | WEIGHT: 156.8 LBS | DIASTOLIC BLOOD PRESSURE: 58 MMHG | BODY MASS INDEX: 24.61 KG/M2 | SYSTOLIC BLOOD PRESSURE: 132 MMHG

## 2025-03-06 DIAGNOSIS — Z00.00 HEALTHCARE MAINTENANCE: ICD-10-CM

## 2025-03-06 DIAGNOSIS — Z00.00 ROUTINE GENERAL MEDICAL EXAMINATION AT HEALTH CARE FACILITY: Primary | ICD-10-CM

## 2025-03-06 DIAGNOSIS — E78.5 HYPERLIPIDEMIA, UNSPECIFIED HYPERLIPIDEMIA TYPE: ICD-10-CM

## 2025-03-06 DIAGNOSIS — I10 HYPERTENSION, UNSPECIFIED TYPE: ICD-10-CM

## 2025-03-06 DIAGNOSIS — L40.9 PSORIASIS OF SCALP: ICD-10-CM

## 2025-03-06 DIAGNOSIS — I48.11 LONGSTANDING PERSISTENT ATRIAL FIBRILLATION (MULTI): ICD-10-CM

## 2025-03-06 DIAGNOSIS — E03.9 HYPOTHYROIDISM, UNSPECIFIED TYPE: ICD-10-CM

## 2025-03-06 PROCEDURE — 1170F FXNL STATUS ASSESSED: CPT | Performed by: NURSE PRACTITIONER

## 2025-03-06 PROCEDURE — 1124F ACP DISCUSS-NO DSCNMKR DOCD: CPT | Performed by: NURSE PRACTITIONER

## 2025-03-06 PROCEDURE — 3075F SYST BP GE 130 - 139MM HG: CPT | Performed by: NURSE PRACTITIONER

## 2025-03-06 PROCEDURE — 1036F TOBACCO NON-USER: CPT | Performed by: NURSE PRACTITIONER

## 2025-03-06 PROCEDURE — 1159F MED LIST DOCD IN RCRD: CPT | Performed by: NURSE PRACTITIONER

## 2025-03-06 PROCEDURE — 1157F ADVNC CARE PLAN IN RCRD: CPT | Performed by: NURSE PRACTITIONER

## 2025-03-06 PROCEDURE — 1125F AMNT PAIN NOTED PAIN PRSNT: CPT | Performed by: NURSE PRACTITIONER

## 2025-03-06 PROCEDURE — 1160F RVW MEDS BY RX/DR IN RCRD: CPT | Performed by: NURSE PRACTITIONER

## 2025-03-06 PROCEDURE — G0439 PPPS, SUBSEQ VISIT: HCPCS | Performed by: NURSE PRACTITIONER

## 2025-03-06 PROCEDURE — 3078F DIAST BP <80 MM HG: CPT | Performed by: NURSE PRACTITIONER

## 2025-03-06 RX ORDER — ATORVASTATIN CALCIUM 20 MG/1
20 TABLET, FILM COATED ORAL DAILY
Qty: 90 TABLET | Refills: 3 | Status: SHIPPED | OUTPATIENT
Start: 2025-03-06

## 2025-03-06 RX ORDER — AMLODIPINE BESYLATE 10 MG/1
10 TABLET ORAL DAILY
Qty: 90 TABLET | Refills: 3 | Status: SHIPPED | OUTPATIENT
Start: 2025-03-06

## 2025-03-06 RX ORDER — CLOBETASOL PROPIONATE 0.5 MG/ML
SOLUTION TOPICAL 2 TIMES DAILY
Qty: 60 ML | Refills: 1 | Status: SHIPPED | OUTPATIENT
Start: 2025-03-06 | End: 2025-03-16

## 2025-03-06 RX ORDER — WARFARIN SODIUM 5 MG/1
TABLET ORAL
Qty: 90 TABLET | Refills: 3 | OUTPATIENT
Start: 2025-03-06

## 2025-03-06 RX ORDER — SODIUM BICARBONATE 650 MG/1
1300 TABLET ORAL 2 TIMES DAILY
Qty: 360 TABLET | Refills: 3 | Status: SHIPPED | OUTPATIENT
Start: 2025-03-06 | End: 2026-03-06

## 2025-03-06 RX ORDER — WARFARIN SODIUM 5 MG/1
5 TABLET ORAL NIGHTLY
Qty: 90 TABLET | Refills: 3 | Status: SHIPPED | OUTPATIENT
Start: 2025-03-06 | End: 2026-03-06

## 2025-03-06 RX ORDER — LEVOTHYROXINE SODIUM 75 UG/1
75 TABLET ORAL DAILY
Qty: 90 TABLET | Refills: 3 | Status: SHIPPED | OUTPATIENT
Start: 2025-03-06

## 2025-03-06 RX ORDER — CHLORTHALIDONE 25 MG/1
25 TABLET ORAL
Qty: 90 TABLET | Refills: 3 | Status: SHIPPED | OUTPATIENT
Start: 2025-03-06 | End: 2026-03-06

## 2025-03-06 ASSESSMENT — LIFESTYLE VARIABLES: HOW MANY STANDARD DRINKS CONTAINING ALCOHOL DO YOU HAVE ON A TYPICAL DAY: PATIENT DOES NOT DRINK

## 2025-03-06 ASSESSMENT — ENCOUNTER SYMPTOMS
LOSS OF SENSATION IN FEET: 0
FREQUENCY: 0
RESPIRATORY NEGATIVE: 1
ABDOMINAL PAIN: 0
LIGHT-HEADEDNESS: 0
ARTHRALGIAS: 0
ADENOPATHY: 0
HEADACHES: 0
NEUROLOGICAL NEGATIVE: 1
BRUISES/BLEEDS EASILY: 0
DIFFICULTY URINATING: 0
ACTIVITY CHANGE: 0
DIZZINESS: 0
PALPITATIONS: 0
NERVOUS/ANXIOUS: 0
GASTROINTESTINAL NEGATIVE: 1
APPETITE CHANGE: 0
CARDIOVASCULAR NEGATIVE: 1
DEPRESSION: 0
CHEST TIGHTNESS: 0
EYES NEGATIVE: 1
JOINT SWELLING: 0
SHORTNESS OF BREATH: 0
ENDOCRINE NEGATIVE: 1
AGITATION: 0
COUGH: 0
DIARRHEA: 0
DYSURIA: 0
SORE THROAT: 0
BLOOD IN STOOL: 0
TREMORS: 0
EYE DISCHARGE: 0
WHEEZING: 0
ALLERGIC/IMMUNOLOGIC NEGATIVE: 1
HEMATURIA: 0
EYE REDNESS: 0
OCCASIONAL FEELINGS OF UNSTEADINESS: 0
CONSTIPATION: 0

## 2025-03-06 ASSESSMENT — ACTIVITIES OF DAILY LIVING (ADL)
TAKING_MEDICATION: INDEPENDENT
DOING_HOUSEWORK: INDEPENDENT
BATHING: INDEPENDENT
GROCERY_SHOPPING: INDEPENDENT
DRESSING: INDEPENDENT
MANAGING_FINANCES: INDEPENDENT

## 2025-03-06 ASSESSMENT — PATIENT HEALTH QUESTIONNAIRE - PHQ9
10. IF YOU CHECKED OFF ANY PROBLEMS, HOW DIFFICULT HAVE THESE PROBLEMS MADE IT FOR YOU TO DO YOUR WORK, TAKE CARE OF THINGS AT HOME, OR GET ALONG WITH OTHER PEOPLE: NOT DIFFICULT AT ALL
1. LITTLE INTEREST OR PLEASURE IN DOING THINGS: NOT AT ALL
2. FEELING DOWN, DEPRESSED OR HOPELESS: SEVERAL DAYS
SUM OF ALL RESPONSES TO PHQ9 QUESTIONS 1 AND 2: 1

## 2025-03-06 ASSESSMENT — PAIN SCALES - GENERAL: PAINLEVEL_OUTOF10: 5

## 2025-03-06 NOTE — ASSESSMENT & PLAN NOTE
Orders:    Jantoven 5 mg tablet; Take 1 tablet (5 mg) by mouth once daily at bedtime.    Follow Up In Primary Care - Established; Future

## 2025-03-06 NOTE — PROGRESS NOTES
Subjective   Reason for Visit: Devin Fung Jr. is an 80 y.o. male here for a Medicare Wellness visit.     Past Medical, Surgical, and Family History reviewed and updated in chart.    Reviewed all medications by prescribing practitioner or clinical pharmacist (such as prescriptions, OTCs, herbal therapies and supplements) and documented in the medical record.    Presents today for annual Medicare wellness exam.   He does need refills which we will send today.  He will be due for lab work.  He has a CT scan scheduled in May and would like to get all his lab work performed at the same time.  Will place orders and address results when available.  Denies chest pain or shortness of breath.  He follows with Dr. Rubin for cardiology.  He follows with Dr. Lawson for urology.  And he follows with  for nephrology.  He has a home PT/INR machine that he monitors his INR levels and adjust his Coumadin/Jantoven as indicated.  He reports some increase in psoriasis symptoms to his scalp.  * This note was partially generated using the Dragon Voice recognition system. There may be some incorrect wording, spelling and/or spelling errors or punctuation errors that were not corrected prior to committing the note to the medical record.*    Patient Care Team:  FAWAD Steiner-CNP as PCP - General (Family Medicine)  Rowena Jolly MD     Review of Systems   Constitutional:  Negative for activity change and appetite change.   HENT:  Negative for congestion, ear pain, hearing loss and sore throat.    Eyes: Negative.  Negative for discharge, redness and visual disturbance.   Respiratory: Negative.  Negative for cough, chest tightness, shortness of breath and wheezing.    Cardiovascular: Negative.  Negative for chest pain, palpitations and leg swelling.   Gastrointestinal: Negative.  Negative for abdominal pain, blood in stool, constipation and diarrhea.   Endocrine: Negative.    Genitourinary: Negative.  Negative for  "difficulty urinating, dysuria, frequency, hematuria, penile discharge and testicular pain.   Musculoskeletal:  Negative for arthralgias and joint swelling.   Skin:  Negative for rash.   Allergic/Immunologic: Negative.    Neurological: Negative.  Negative for dizziness, tremors, light-headedness and headaches.   Hematological:  Negative for adenopathy. Does not bruise/bleed easily.   Psychiatric/Behavioral:  Negative for agitation. The patient is not nervous/anxious.        Objective   Vitals:  /58   Pulse 76   Temp 36.7 °C (98 °F) (Temporal)   Ht 1.702 m (5' 7\")   Wt 71.1 kg (156 lb 12.8 oz)   BMI 24.56 kg/m²       Physical Exam  Constitutional:       General: He is not in acute distress.     Appearance: Normal appearance.   HENT:      Head: Normocephalic.   Eyes:      Conjunctiva/sclera: Conjunctivae normal.   Neck:      Vascular: Normal carotid pulses. No carotid bruit.   Cardiovascular:      Rate and Rhythm: Normal rate and regular rhythm.      Heart sounds: Normal heart sounds.   Pulmonary:      Effort: Pulmonary effort is normal.      Breath sounds: Normal breath sounds.   Abdominal:      General: Bowel sounds are normal.      Palpations: Abdomen is soft.   Musculoskeletal:         General: Normal range of motion.      Cervical back: No crepitus. No pain with movement.   Skin:     General: Skin is warm and dry.      Capillary Refill: Capillary refill takes less than 2 seconds.      Comments: Dry crusted areas noted to scalp.  Flaky white patches identified as well.   Neurological:      Mental Status: He is alert and oriented to person, place, and time.   Psychiatric:         Mood and Affect: Mood normal.         Speech: Speech normal.         Assessment & Plan  Healthcare maintenance    Orders:    sodium bicarbonate 650 mg tablet; Take 2 tablets (1,300 mg) by mouth 2 times a day.    Hypothyroidism, unspecified type    Orders:    levothyroxine (Synthroid, Levoxyl) 75 mcg tablet; Take 1 tablet (75 mcg) " by mouth once daily.    Follow Up In Primary Care - Established; Future    Hyperlipidemia, unspecified hyperlipidemia type    Orders:    atorvastatin (Lipitor) 20 mg tablet; Take 1 tablet (20 mg) by mouth once daily. as directed    Lipid panel; Future    Hypertension, unspecified type    Orders:    chlorthalidone (Hygroton) 25 mg tablet; Take 1 tablet (25 mg) by mouth once daily.    amLODIPine (Norvasc) 10 mg tablet; Take 1 tablet (10 mg) by mouth once daily. as directed    Follow Up In Primary Care - Established; Future    Longstanding persistent atrial fibrillation (Multi)    Orders:    Jantoven 5 mg tablet; Take 1 tablet (5 mg) by mouth once daily at bedtime.    Follow Up In Primary Care - Established; Future    Routine general medical examination at health care facility    Orders:    1 Year Follow Up In Primary Care - Wellness Exam; Future    Follow Up In Primary Care - Established; Future    Psoriasis of scalp    Orders:    clobetasol (Temovate) 0.05 % external solution; Apply topically 2 times a day for 10 days.

## 2025-03-06 NOTE — PATIENT INSTRUCTIONS
Focus on healthy eating including lean proteins and vegetables.  Avoid high carbohydrate high sugar foods and drinks.  Try to increase physical activity as tolerated.  Goal is for 160 minutes/week of physical activity.  Check blood pressure 2-3 times a week.  Call for blood pressures greater than 140/90.  Bring list of blood pressures to next visit.  Labwork obtained today.  Will address results when available  Try Tea Tree Oil based Shampoo  Nioxin is also a brand of shampoo you can use  Start Clobetasol twice a day as needed.  After scalp calms down, can use as needed

## 2025-03-19 DIAGNOSIS — L40.9 PSORIASIS OF SCALP: ICD-10-CM

## 2025-03-19 DIAGNOSIS — Z00.00 HEALTHCARE MAINTENANCE: ICD-10-CM

## 2025-03-19 DIAGNOSIS — K59.00 CONSTIPATION, UNSPECIFIED CONSTIPATION TYPE: Primary | ICD-10-CM

## 2025-03-19 DIAGNOSIS — E03.9 HYPOTHYROIDISM, UNSPECIFIED TYPE: ICD-10-CM

## 2025-03-19 DIAGNOSIS — I48.11 LONGSTANDING PERSISTENT ATRIAL FIBRILLATION (MULTI): ICD-10-CM

## 2025-03-19 DIAGNOSIS — I10 HYPERTENSION, UNSPECIFIED TYPE: ICD-10-CM

## 2025-03-19 DIAGNOSIS — E78.5 HYPERLIPIDEMIA, UNSPECIFIED HYPERLIPIDEMIA TYPE: ICD-10-CM

## 2025-03-19 RX ORDER — ATORVASTATIN CALCIUM 20 MG/1
20 TABLET, FILM COATED ORAL DAILY
Qty: 90 TABLET | Refills: 1 | Status: SHIPPED | OUTPATIENT
Start: 2025-03-19 | End: 2026-03-19

## 2025-03-19 RX ORDER — LEVOTHYROXINE SODIUM 75 UG/1
75 TABLET ORAL DAILY
Qty: 90 TABLET | Refills: 1 | Status: SHIPPED | OUTPATIENT
Start: 2025-03-19 | End: 2026-03-19

## 2025-03-19 RX ORDER — CHLORTHALIDONE 25 MG/1
25 TABLET ORAL
Qty: 90 TABLET | Refills: 1 | Status: SHIPPED | OUTPATIENT
Start: 2025-03-19 | End: 2026-03-19

## 2025-03-19 RX ORDER — WARFARIN SODIUM 5 MG/1
5 TABLET ORAL NIGHTLY
Qty: 90 TABLET | Refills: 1 | Status: SHIPPED | OUTPATIENT
Start: 2025-03-19 | End: 2026-03-19

## 2025-03-19 RX ORDER — AMLODIPINE BESYLATE 10 MG/1
10 TABLET ORAL DAILY
Qty: 90 TABLET | Refills: 1 | Status: SHIPPED | OUTPATIENT
Start: 2025-03-19 | End: 2026-03-19

## 2025-03-19 RX ORDER — LISINOPRIL 40 MG/1
40 TABLET ORAL DAILY
Qty: 90 TABLET | Refills: 1 | Status: SHIPPED | OUTPATIENT
Start: 2025-03-19

## 2025-03-19 RX ORDER — SODIUM BICARBONATE 650 MG/1
1300 TABLET ORAL 2 TIMES DAILY
Qty: 360 TABLET | Refills: 1 | Status: CANCELLED | OUTPATIENT
Start: 2025-03-19 | End: 2026-03-19

## 2025-03-24 DIAGNOSIS — C67.9 MALIGNANT NEOPLASM OF URINARY BLADDER, UNSPECIFIED SITE (MULTI): Primary | ICD-10-CM

## 2025-03-24 DIAGNOSIS — N30.00 ACUTE CYSTITIS WITHOUT HEMATURIA: ICD-10-CM

## 2025-03-24 RX ORDER — AMOXICILLIN AND CLAVULANATE POTASSIUM 875; 125 MG/1; MG/1
1 TABLET, FILM COATED ORAL 2 TIMES DAILY
Qty: 14 TABLET | Refills: 0 | Status: SHIPPED | OUTPATIENT
Start: 2025-03-24 | End: 2025-03-31

## 2025-03-25 LAB
ALBUMIN SERPL-MCNC: 4.3 G/DL (ref 3.6–5.1)
ALP SERPL-CCNC: 48 U/L (ref 35–144)
ALT SERPL-CCNC: 11 U/L (ref 9–46)
ANION GAP SERPL CALCULATED.4IONS-SCNC: 14 MMOL/L (CALC) (ref 7–17)
AST SERPL-CCNC: 14 U/L (ref 10–35)
BILIRUB SERPL-MCNC: 0.5 MG/DL (ref 0.2–1.2)
BUN SERPL-MCNC: 70 MG/DL (ref 7–25)
CALCIUM SERPL-MCNC: 8.8 MG/DL (ref 8.6–10.3)
CHLORIDE SERPL-SCNC: 99 MMOL/L (ref 98–110)
CHOLEST SERPL-MCNC: 142 MG/DL
CHOLEST/HDLC SERPL: 4.6 (CALC)
CO2 SERPL-SCNC: 27 MMOL/L (ref 20–32)
CREAT SERPL-MCNC: 3.9 MG/DL (ref 0.7–1.28)
EGFRCR SERPLBLD CKD-EPI 2021: 15 ML/MIN/1.73M2
ERYTHROCYTE [DISTWIDTH] IN BLOOD BY AUTOMATED COUNT: 16.5 % (ref 11–15)
GLUCOSE SERPL-MCNC: 78 MG/DL (ref 65–99)
HCT VFR BLD AUTO: 37.4 % (ref 38.5–50)
HDLC SERPL-MCNC: 31 MG/DL
HGB BLD-MCNC: 12 G/DL (ref 13.2–17.1)
LDLC SERPL CALC-MCNC: 81 MG/DL (CALC)
MCH RBC QN AUTO: 26.9 PG (ref 27–33)
MCHC RBC AUTO-ENTMCNC: 32.1 G/DL (ref 32–36)
MCV RBC AUTO: 83.9 FL (ref 80–100)
NONHDLC SERPL-MCNC: 111 MG/DL (CALC)
PLATELET # BLD AUTO: 267 THOUSAND/UL (ref 140–400)
PMV BLD REES-ECKER: 11.9 FL (ref 7.5–12.5)
POTASSIUM SERPL-SCNC: 4.1 MMOL/L (ref 3.5–5.3)
PROT SERPL-MCNC: 7.5 G/DL (ref 6.1–8.1)
RBC # BLD AUTO: 4.46 MILLION/UL (ref 4.2–5.8)
SODIUM SERPL-SCNC: 140 MMOL/L (ref 135–146)
TRIGL SERPL-MCNC: 201 MG/DL
WBC # BLD AUTO: 6 THOUSAND/UL (ref 3.8–10.8)

## 2025-04-03 ENCOUNTER — APPOINTMENT (OUTPATIENT)
Dept: CARDIOLOGY | Facility: HOSPITAL | Age: 80
End: 2025-04-03
Payer: MEDICARE

## 2025-04-03 ENCOUNTER — APPOINTMENT (OUTPATIENT)
Dept: RADIOLOGY | Facility: HOSPITAL | Age: 80
End: 2025-04-03
Payer: MEDICARE

## 2025-04-03 ENCOUNTER — HOSPITAL ENCOUNTER (OUTPATIENT)
Facility: HOSPITAL | Age: 80
Setting detail: OBSERVATION
Discharge: HOME | End: 2025-04-04
Attending: EMERGENCY MEDICINE | Admitting: INTERNAL MEDICINE
Payer: MEDICARE

## 2025-04-03 DIAGNOSIS — R10.9 FLANK PAIN: ICD-10-CM

## 2025-04-03 DIAGNOSIS — N39.0 URINARY TRACT INFECTION WITHOUT HEMATURIA, SITE UNSPECIFIED: ICD-10-CM

## 2025-04-03 DIAGNOSIS — R11.2 NAUSEA AND VOMITING, UNSPECIFIED VOMITING TYPE: ICD-10-CM

## 2025-04-03 DIAGNOSIS — R53.1 GENERALIZED WEAKNESS: Primary | ICD-10-CM

## 2025-04-03 LAB
ALBUMIN SERPL BCP-MCNC: 4.5 G/DL (ref 3.4–5)
ALP SERPL-CCNC: 51 U/L (ref 33–136)
ALT SERPL W P-5'-P-CCNC: 8 U/L (ref 10–52)
AMORPH CRY #/AREA UR COMP ASSIST: ABNORMAL /HPF
ANION GAP SERPL CALC-SCNC: 17 MMOL/L (ref 10–20)
APPEARANCE UR: ABNORMAL
AST SERPL W P-5'-P-CCNC: 16 U/L (ref 9–39)
BASOPHILS # BLD AUTO: 0.02 X10*3/UL (ref 0–0.1)
BASOPHILS NFR BLD AUTO: 0.2 %
BILIRUB SERPL-MCNC: 0.7 MG/DL (ref 0–1.2)
BILIRUB UR STRIP.AUTO-MCNC: NEGATIVE MG/DL
BUN SERPL-MCNC: 64 MG/DL (ref 6–23)
CALCIUM SERPL-MCNC: 8.8 MG/DL (ref 8.6–10.3)
CARDIAC TROPONIN I PNL SERPL HS: 9 NG/L (ref 0–20)
CHLORIDE SERPL-SCNC: 100 MMOL/L (ref 98–107)
CO2 SERPL-SCNC: 23 MMOL/L (ref 21–32)
COLOR UR: ABNORMAL
CREAT SERPL-MCNC: 4.15 MG/DL (ref 0.5–1.3)
EGFRCR SERPLBLD CKD-EPI 2021: 14 ML/MIN/1.73M*2
EOSINOPHIL # BLD AUTO: 0.09 X10*3/UL (ref 0–0.4)
EOSINOPHIL NFR BLD AUTO: 1 %
ERYTHROCYTE [DISTWIDTH] IN BLOOD BY AUTOMATED COUNT: 17.2 % (ref 11.5–14.5)
FLUAV RNA RESP QL NAA+PROBE: NOT DETECTED
FLUBV RNA RESP QL NAA+PROBE: NOT DETECTED
GLUCOSE BLD MANUAL STRIP-MCNC: 93 MG/DL (ref 74–99)
GLUCOSE SERPL-MCNC: 88 MG/DL (ref 74–99)
GLUCOSE UR STRIP.AUTO-MCNC: NORMAL MG/DL
HCT VFR BLD AUTO: 40.5 % (ref 41–52)
HGB BLD-MCNC: 13.2 G/DL (ref 13.5–17.5)
IMM GRANULOCYTES # BLD AUTO: 0.03 X10*3/UL (ref 0–0.5)
IMM GRANULOCYTES NFR BLD AUTO: 0.3 % (ref 0–0.9)
KETONES UR STRIP.AUTO-MCNC: NEGATIVE MG/DL
LEUKOCYTE ESTERASE UR QL STRIP.AUTO: ABNORMAL
LIPASE SERPL-CCNC: 15 U/L (ref 9–82)
LYMPHOCYTES # BLD AUTO: 1.2 X10*3/UL (ref 0.8–3)
LYMPHOCYTES NFR BLD AUTO: 13.9 %
MAGNESIUM SERPL-MCNC: 2.29 MG/DL (ref 1.6–2.4)
MCH RBC QN AUTO: 27.1 PG (ref 26–34)
MCHC RBC AUTO-ENTMCNC: 32.6 G/DL (ref 32–36)
MCV RBC AUTO: 83 FL (ref 80–100)
MONOCYTES # BLD AUTO: 0.4 X10*3/UL (ref 0.05–0.8)
MONOCYTES NFR BLD AUTO: 4.6 %
MUCOUS THREADS #/AREA URNS AUTO: ABNORMAL /LPF
NEUTROPHILS # BLD AUTO: 6.91 X10*3/UL (ref 1.6–5.5)
NEUTROPHILS NFR BLD AUTO: 80 %
NITRITE UR QL STRIP.AUTO: NEGATIVE
NRBC BLD-RTO: 0 /100 WBCS (ref 0–0)
PH UR STRIP.AUTO: 6.5 [PH]
PLATELET # BLD AUTO: 256 X10*3/UL (ref 150–450)
POTASSIUM SERPL-SCNC: 4.6 MMOL/L (ref 3.5–5.3)
PROT SERPL-MCNC: 7.9 G/DL (ref 6.4–8.2)
PROT UR STRIP.AUTO-MCNC: ABNORMAL MG/DL
Q ONSET: 213 MS
QRS COUNT: 12 BEATS
QRS DURATION: 88 MS
QT INTERVAL: 394 MS
QTC CALCULATION(BAZETT): 431 MS
QTC FREDERICIA: 418 MS
R AXIS: 8 DEGREES
RBC # BLD AUTO: 4.87 X10*6/UL (ref 4.5–5.9)
RBC # UR STRIP.AUTO: ABNORMAL MG/DL
RBC #/AREA URNS AUTO: >20 /HPF
RSV RNA RESP QL NAA+PROBE: NOT DETECTED
SARS-COV-2 RNA RESP QL NAA+PROBE: NOT DETECTED
SODIUM SERPL-SCNC: 135 MMOL/L (ref 136–145)
SP GR UR STRIP.AUTO: 1.01
T AXIS: 53 DEGREES
T OFFSET: 410 MS
UFH PPP CHRO-ACNC: 0.2 IU/ML
UROBILINOGEN UR STRIP.AUTO-MCNC: NORMAL MG/DL
VENTRICULAR RATE: 72 BPM
WBC # BLD AUTO: 8.7 X10*3/UL (ref 4.4–11.3)
WBC #/AREA URNS AUTO: >50 /HPF

## 2025-04-03 PROCEDURE — 96366 THER/PROPH/DIAG IV INF ADDON: CPT

## 2025-04-03 PROCEDURE — 96375 TX/PRO/DX INJ NEW DRUG ADDON: CPT

## 2025-04-03 PROCEDURE — 96365 THER/PROPH/DIAG IV INF INIT: CPT

## 2025-04-03 PROCEDURE — 36415 COLL VENOUS BLD VENIPUNCTURE: CPT

## 2025-04-03 PROCEDURE — 2500000004 HC RX 250 GENERAL PHARMACY W/ HCPCS (ALT 636 FOR OP/ED)

## 2025-04-03 PROCEDURE — G0378 HOSPITAL OBSERVATION PER HR: HCPCS

## 2025-04-03 PROCEDURE — 2500000001 HC RX 250 WO HCPCS SELF ADMINISTERED DRUGS (ALT 637 FOR MEDICARE OP)

## 2025-04-03 PROCEDURE — 87086 URINE CULTURE/COLONY COUNT: CPT | Mod: GEALAB

## 2025-04-03 PROCEDURE — 85025 COMPLETE CBC W/AUTO DIFF WBC: CPT

## 2025-04-03 PROCEDURE — 93005 ELECTROCARDIOGRAM TRACING: CPT

## 2025-04-03 PROCEDURE — 83735 ASSAY OF MAGNESIUM: CPT

## 2025-04-03 PROCEDURE — 74176 CT ABD & PELVIS W/O CONTRAST: CPT | Performed by: RADIOLOGY

## 2025-04-03 PROCEDURE — 87040 BLOOD CULTURE FOR BACTERIA: CPT | Mod: GEALAB

## 2025-04-03 PROCEDURE — 2500000004 HC RX 250 GENERAL PHARMACY W/ HCPCS (ALT 636 FOR OP/ED): Performed by: INTERNAL MEDICINE

## 2025-04-03 PROCEDURE — 80053 COMPREHEN METABOLIC PANEL: CPT

## 2025-04-03 PROCEDURE — 81001 URINALYSIS AUTO W/SCOPE: CPT

## 2025-04-03 PROCEDURE — 83690 ASSAY OF LIPASE: CPT | Performed by: STUDENT IN AN ORGANIZED HEALTH CARE EDUCATION/TRAINING PROGRAM

## 2025-04-03 PROCEDURE — 76705 ECHO EXAM OF ABDOMEN: CPT

## 2025-04-03 PROCEDURE — 99285 EMERGENCY DEPT VISIT HI MDM: CPT | Mod: 25 | Performed by: EMERGENCY MEDICINE

## 2025-04-03 PROCEDURE — 85520 HEPARIN ASSAY: CPT | Performed by: INTERNAL MEDICINE

## 2025-04-03 PROCEDURE — 74176 CT ABD & PELVIS W/O CONTRAST: CPT

## 2025-04-03 PROCEDURE — 87637 SARSCOV2&INF A&B&RSV AMP PRB: CPT

## 2025-04-03 PROCEDURE — 87075 CULTR BACTERIA EXCEPT BLOOD: CPT | Mod: GEALAB

## 2025-04-03 PROCEDURE — 84484 ASSAY OF TROPONIN QUANT: CPT | Performed by: STUDENT IN AN ORGANIZED HEALTH CARE EDUCATION/TRAINING PROGRAM

## 2025-04-03 PROCEDURE — 76705 ECHO EXAM OF ABDOMEN: CPT | Performed by: RADIOLOGY

## 2025-04-03 PROCEDURE — 96367 TX/PROPH/DG ADDL SEQ IV INF: CPT

## 2025-04-03 PROCEDURE — 99223 1ST HOSP IP/OBS HIGH 75: CPT

## 2025-04-03 PROCEDURE — 82947 ASSAY GLUCOSE BLOOD QUANT: CPT

## 2025-04-03 RX ORDER — HEPARIN SODIUM 10000 [USP'U]/100ML
0-4000 INJECTION, SOLUTION INTRAVENOUS CONTINUOUS
Status: DISCONTINUED | OUTPATIENT
Start: 2025-04-03 | End: 2025-04-04

## 2025-04-03 RX ORDER — LEVOTHYROXINE SODIUM 75 UG/1
75 TABLET ORAL DAILY
Status: DISCONTINUED | OUTPATIENT
Start: 2025-04-04 | End: 2025-04-04 | Stop reason: HOSPADM

## 2025-04-03 RX ORDER — LANOLIN ALCOHOL/MO/W.PET/CERES
100 CREAM (GRAM) TOPICAL DAILY
Status: DISCONTINUED | OUTPATIENT
Start: 2025-04-04 | End: 2025-04-04 | Stop reason: HOSPADM

## 2025-04-03 RX ORDER — ATORVASTATIN CALCIUM 20 MG/1
20 TABLET, FILM COATED ORAL NIGHTLY
Status: DISCONTINUED | OUTPATIENT
Start: 2025-04-03 | End: 2025-04-04 | Stop reason: HOSPADM

## 2025-04-03 RX ORDER — CEFTRIAXONE 1 G/50ML
1 INJECTION, SOLUTION INTRAVENOUS EVERY 24 HOURS
Status: DISCONTINUED | OUTPATIENT
Start: 2025-04-04 | End: 2025-04-04 | Stop reason: HOSPADM

## 2025-04-03 RX ORDER — ONDANSETRON 4 MG/1
4 TABLET, FILM COATED ORAL EVERY 8 HOURS PRN
Status: DISCONTINUED | OUTPATIENT
Start: 2025-04-03 | End: 2025-04-04 | Stop reason: HOSPADM

## 2025-04-03 RX ORDER — CHLORTHALIDONE 25 MG/1
25 TABLET ORAL
Status: DISCONTINUED | OUTPATIENT
Start: 2025-04-04 | End: 2025-04-04 | Stop reason: HOSPADM

## 2025-04-03 RX ORDER — ONDANSETRON HYDROCHLORIDE 2 MG/ML
4 INJECTION, SOLUTION INTRAVENOUS ONCE
Status: COMPLETED | OUTPATIENT
Start: 2025-04-03 | End: 2025-04-03

## 2025-04-03 RX ORDER — CEFTRIAXONE 1 G/50ML
1 INJECTION, SOLUTION INTRAVENOUS ONCE
Status: COMPLETED | OUTPATIENT
Start: 2025-04-03 | End: 2025-04-03

## 2025-04-03 RX ORDER — LISINOPRIL 20 MG/1
40 TABLET ORAL DAILY
Status: DISCONTINUED | OUTPATIENT
Start: 2025-04-04 | End: 2025-04-03

## 2025-04-03 RX ORDER — ACETAMINOPHEN 500 MG
5 TABLET ORAL ONCE
Status: COMPLETED | OUTPATIENT
Start: 2025-04-03 | End: 2025-04-03

## 2025-04-03 RX ORDER — INSULIN LISPRO 100 [IU]/ML
0-10 INJECTION, SOLUTION INTRAVENOUS; SUBCUTANEOUS
Status: DISCONTINUED | OUTPATIENT
Start: 2025-04-03 | End: 2025-04-03

## 2025-04-03 RX ORDER — SENNOSIDES 8.6 MG/1
2 TABLET ORAL 2 TIMES DAILY
Status: DISCONTINUED | OUTPATIENT
Start: 2025-04-03 | End: 2025-04-03

## 2025-04-03 RX ORDER — AMLODIPINE BESYLATE 10 MG/1
10 TABLET ORAL DAILY
Status: DISCONTINUED | OUTPATIENT
Start: 2025-04-04 | End: 2025-04-04 | Stop reason: HOSPADM

## 2025-04-03 RX ORDER — ASPIRIN 81 MG/1
81 TABLET ORAL DAILY
Status: DISCONTINUED | OUTPATIENT
Start: 2025-04-04 | End: 2025-04-04 | Stop reason: HOSPADM

## 2025-04-03 RX ORDER — SODIUM BICARBONATE 650 MG/1
1300 TABLET ORAL 2 TIMES DAILY
Status: DISCONTINUED | OUTPATIENT
Start: 2025-04-03 | End: 2025-04-04 | Stop reason: HOSPADM

## 2025-04-03 RX ADMIN — HEPARIN SODIUM 800 UNITS/HR: 10000 INJECTION, SOLUTION INTRAVENOUS at 18:01

## 2025-04-03 RX ADMIN — CEFTRIAXONE SODIUM 1 G: 1 INJECTION, SOLUTION INTRAVENOUS at 15:41

## 2025-04-03 RX ADMIN — ONDANSETRON 4 MG: 2 INJECTION INTRAMUSCULAR; INTRAVENOUS at 13:23

## 2025-04-03 RX ADMIN — SODIUM BICARBONATE 1300 MG: 650 TABLET ORAL at 20:10

## 2025-04-03 RX ADMIN — SODIUM CHLORIDE, SODIUM LACTATE, POTASSIUM CHLORIDE, AND CALCIUM CHLORIDE 1000 ML: .6; .31; .03; .02 INJECTION, SOLUTION INTRAVENOUS at 17:07

## 2025-04-03 RX ADMIN — Medication 5 MG: at 20:10

## 2025-04-03 RX ADMIN — ATORVASTATIN CALCIUM 20 MG: 20 TABLET, FILM COATED ORAL at 20:10

## 2025-04-03 SDOH — ECONOMIC STABILITY: FOOD INSECURITY: WITHIN THE PAST 12 MONTHS, YOU WORRIED THAT YOUR FOOD WOULD RUN OUT BEFORE YOU GOT THE MONEY TO BUY MORE.: NEVER TRUE

## 2025-04-03 SDOH — SOCIAL STABILITY: SOCIAL INSECURITY: DOES ANYONE TRY TO KEEP YOU FROM HAVING/CONTACTING OTHER FRIENDS OR DOING THINGS OUTSIDE YOUR HOME?: NO

## 2025-04-03 SDOH — SOCIAL STABILITY: SOCIAL INSECURITY: ARE THERE ANY APPARENT SIGNS OF INJURIES/BEHAVIORS THAT COULD BE RELATED TO ABUSE/NEGLECT?: NO

## 2025-04-03 SDOH — SOCIAL STABILITY: SOCIAL INSECURITY: WITHIN THE LAST YEAR, HAVE YOU BEEN HUMILIATED OR EMOTIONALLY ABUSED IN OTHER WAYS BY YOUR PARTNER OR EX-PARTNER?: NO

## 2025-04-03 SDOH — SOCIAL STABILITY: SOCIAL INSECURITY: DO YOU FEEL UNSAFE GOING BACK TO THE PLACE WHERE YOU ARE LIVING?: NO

## 2025-04-03 SDOH — SOCIAL STABILITY: SOCIAL INSECURITY
WITHIN THE LAST YEAR, HAVE YOU BEEN RAPED OR FORCED TO HAVE ANY KIND OF SEXUAL ACTIVITY BY YOUR PARTNER OR EX-PARTNER?: NO

## 2025-04-03 SDOH — ECONOMIC STABILITY: FOOD INSECURITY: WITHIN THE PAST 12 MONTHS, THE FOOD YOU BOUGHT JUST DIDN'T LAST AND YOU DIDN'T HAVE MONEY TO GET MORE.: NEVER TRUE

## 2025-04-03 SDOH — SOCIAL STABILITY: SOCIAL INSECURITY: WERE YOU ABLE TO COMPLETE ALL THE BEHAVIORAL HEALTH SCREENINGS?: YES

## 2025-04-03 SDOH — ECONOMIC STABILITY: INCOME INSECURITY: IN THE PAST 12 MONTHS HAS THE ELECTRIC, GAS, OIL, OR WATER COMPANY THREATENED TO SHUT OFF SERVICES IN YOUR HOME?: NO

## 2025-04-03 SDOH — SOCIAL STABILITY: SOCIAL INSECURITY: WITHIN THE LAST YEAR, HAVE YOU BEEN AFRAID OF YOUR PARTNER OR EX-PARTNER?: NO

## 2025-04-03 SDOH — SOCIAL STABILITY: SOCIAL INSECURITY
WITHIN THE LAST YEAR, HAVE YOU BEEN KICKED, HIT, SLAPPED, OR OTHERWISE PHYSICALLY HURT BY YOUR PARTNER OR EX-PARTNER?: NO

## 2025-04-03 SDOH — SOCIAL STABILITY: SOCIAL INSECURITY: HAS ANYONE EVER THREATENED TO HURT YOUR FAMILY OR YOUR PETS?: NO

## 2025-04-03 SDOH — SOCIAL STABILITY: SOCIAL INSECURITY: HAVE YOU HAD THOUGHTS OF HARMING ANYONE ELSE?: NO

## 2025-04-03 SDOH — SOCIAL STABILITY: SOCIAL INSECURITY: HAVE YOU HAD ANY THOUGHTS OF HARMING ANYONE ELSE?: NO

## 2025-04-03 SDOH — SOCIAL STABILITY: SOCIAL INSECURITY: DO YOU FEEL ANYONE HAS EXPLOITED OR TAKEN ADVANTAGE OF YOU FINANCIALLY OR OF YOUR PERSONAL PROPERTY?: NO

## 2025-04-03 SDOH — SOCIAL STABILITY: SOCIAL INSECURITY: ARE YOU OR HAVE YOU BEEN THREATENED OR ABUSED PHYSICALLY, EMOTIONALLY, OR SEXUALLY BY ANYONE?: NO

## 2025-04-03 SDOH — SOCIAL STABILITY: SOCIAL INSECURITY: ABUSE: ADULT

## 2025-04-03 ASSESSMENT — COGNITIVE AND FUNCTIONAL STATUS - GENERAL
DAILY ACTIVITIY SCORE: 24
WALKING IN HOSPITAL ROOM: A LITTLE
MOBILITY SCORE: 22
MOBILITY SCORE: 22
WALKING IN HOSPITAL ROOM: A LITTLE
CLIMB 3 TO 5 STEPS WITH RAILING: A LITTLE
DAILY ACTIVITIY SCORE: 24
PATIENT BASELINE BEDBOUND: NO
CLIMB 3 TO 5 STEPS WITH RAILING: A LITTLE

## 2025-04-03 ASSESSMENT — COLUMBIA-SUICIDE SEVERITY RATING SCALE - C-SSRS
6. HAVE YOU EVER DONE ANYTHING, STARTED TO DO ANYTHING, OR PREPARED TO DO ANYTHING TO END YOUR LIFE?: NO
1. IN THE PAST MONTH, HAVE YOU WISHED YOU WERE DEAD OR WISHED YOU COULD GO TO SLEEP AND NOT WAKE UP?: NO
2. HAVE YOU ACTUALLY HAD ANY THOUGHTS OF KILLING YOURSELF?: NO

## 2025-04-03 ASSESSMENT — ACTIVITIES OF DAILY LIVING (ADL)
LACK_OF_TRANSPORTATION: NO
BATHING: INDEPENDENT
LACK_OF_TRANSPORTATION: NO
JUDGMENT_ADEQUATE_SAFELY_COMPLETE_DAILY_ACTIVITIES: YES
ASSISTIVE_DEVICE: DENTURES LOWER;DENTURES UPPER
DRESSING YOURSELF: INDEPENDENT
PATIENT'S MEMORY ADEQUATE TO SAFELY COMPLETE DAILY ACTIVITIES?: YES
ADEQUATE_TO_COMPLETE_ADL: YES
TOILETING: INDEPENDENT
HEARING - RIGHT EAR: HEARING AID
FEEDING YOURSELF: INDEPENDENT
GROOMING: INDEPENDENT
WALKS IN HOME: INDEPENDENT
HEARING - LEFT EAR: DIFFICULTY WITH NOISE

## 2025-04-03 ASSESSMENT — PAIN - FUNCTIONAL ASSESSMENT
PAIN_FUNCTIONAL_ASSESSMENT: 0-10

## 2025-04-03 ASSESSMENT — PAIN SCALES - GENERAL
PAINLEVEL_OUTOF10: 0 - NO PAIN
PAINLEVEL_OUTOF10: 5 - MODERATE PAIN
PAINLEVEL_OUTOF10: 5 - MODERATE PAIN

## 2025-04-03 ASSESSMENT — PAIN DESCRIPTION - ORIENTATION: ORIENTATION: LEFT;RIGHT

## 2025-04-03 ASSESSMENT — LIFESTYLE VARIABLES
TOTAL SCORE: 0
HOW OFTEN DO YOU HAVE 6 OR MORE DRINKS ON ONE OCCASION: NEVER
EVER HAD A DRINK FIRST THING IN THE MORNING TO STEADY YOUR NERVES TO GET RID OF A HANGOVER: NO
HAVE PEOPLE ANNOYED YOU BY CRITICIZING YOUR DRINKING: NO
SKIP TO QUESTIONS 9-10: 1
HOW MANY STANDARD DRINKS CONTAINING ALCOHOL DO YOU HAVE ON A TYPICAL DAY: PATIENT DOES NOT DRINK
EVER FELT BAD OR GUILTY ABOUT YOUR DRINKING: NO
AUDIT-C TOTAL SCORE: 0
HOW OFTEN DO YOU HAVE A DRINK CONTAINING ALCOHOL: NEVER
HAVE YOU EVER FELT YOU SHOULD CUT DOWN ON YOUR DRINKING: NO
AUDIT-C TOTAL SCORE: 0

## 2025-04-03 ASSESSMENT — PATIENT HEALTH QUESTIONNAIRE - PHQ9
1. LITTLE INTEREST OR PLEASURE IN DOING THINGS: NOT AT ALL
SUM OF ALL RESPONSES TO PHQ9 QUESTIONS 1 & 2: 1
2. FEELING DOWN, DEPRESSED OR HOPELESS: SEVERAL DAYS

## 2025-04-03 ASSESSMENT — PAIN DESCRIPTION - LOCATION: LOCATION: BACK

## 2025-04-03 NOTE — ED PROVIDER NOTES
CC: Vomiting and Weakness, Gen (N/V/D, chills, fatigue and muscle aches x 4 weeks. Hx of stage 4 CKD. )     History provided by: Patient and Significant Other  Limitations to History: None    HPI:  Patient is a 80-year-old male with history of hypothyroidism, hyperlipidemia, atrial fibrillation, psoriasis, CKD, prior history of bladder cancer status post urostomy who presents with generalized weakness, nausea, vomiting.  Wife is at bedside providing supplemental history as well.  Patient and wife state for the past 1 month he has been progressively weaker at home, generalized weakness and is only eating 300 rick a day.  He is so tired that he cannot go to work.  He also states he has back pain where his kidneys are and thinks he is in kidney failure and has a history of stage IV kidney failure.  He does have a urostomy bag in place and has scant yellow urine in place in the bag and he states he does not have increased or decreased output and it is at his baseline in the bag.  Also notes generalized abdominal pain, has had a few episodes of nausea, vomiting, last episode of nonbloody, nonbilious vomiting was earlier this morning.  He denies any constipation, diarrhea, back pain, saddle anesthesia, falls, trauma, syncopal events, chest pain, shortness of breath.    I reviewed nurse practitioner primary care note from March 6, 2025 when patient was seen for Medicare wellness visit.  He follows with Dr. Cherry with cardiology.  Follows with Dr. Dillard adequately urology.  Home medications appear to be levothyroxine 75 mcg once daily, and bicarbonate tablets, Lipitor 20 mg, chlorthalidone 25 mg, amlodipine 10 mg, Jantoven 5 mg at bedtime.    External Records Reviewed:  I reviewed prior ED visits, Care Everywhere, discharge summaries and outpatient records as appropriate.   ???????????????????????????????????????????????????????????????  Triage Vitals:  T 37.1 °C (98.8 °F)  HR 96  /71  RR 18  O2 95 % None (Room  air)    Physical Exam  Vitals and nursing note reviewed.   Constitutional:       General: He is not in acute distress.     Appearance: Normal appearance.   HENT:      Head: Normocephalic and atraumatic.   Eyes:      Conjunctiva/sclera: Conjunctivae normal.   Cardiovascular:      Rate and Rhythm: Normal rate and regular rhythm.   Pulmonary:      Effort: Pulmonary effort is normal. No respiratory distress.      Breath sounds: Normal breath sounds.   Abdominal:      General: Abdomen is flat.      Palpations: Abdomen is soft.      Comments: Abdomen is soft, nondistended, diffusely tender to palpation however no rebound or guarding, urostomy bag in place with scant yellow urine with minimal sediment, no bleeding noted in urostomy bag, bilateral CVA tenderness present, no midline C, T, L-spine tenderness to palpation   Musculoskeletal:         General: Normal range of motion.      Cervical back: Normal range of motion and neck supple.   Skin:     General: Skin is warm and dry.   Neurological:      General: No focal deficit present.      Mental Status: He is alert and oriented to person, place, and time. Mental status is at baseline.      Comments: Moving all extremities equally, strength and sensation in bilateral upper and lower extremities grossly intact and symmetric   Psychiatric:         Mood and Affect: Mood normal.         Behavior: Behavior normal.        ???????????????????????????????????????????????????????????????  ED Course/Treatment/Medical Decision Making  MDM:  Patient is a 80-year-old male who presents with generalized weakness, nausea, vomiting and back pain.  Vital signs are overall stable, he does not appear overtly peritonitic or septic.  Does have a known history of CKD therefore CT abdomen and pelvis without IV contrast was obtained.  Ice chips provided.  Given abdominal tenderness we will proceed with CT scan, basic labs.  He denies any chest pain at this time however EKG was obtained.  Urostomy  overall appears clean, dry, intact, he denies any decreased urostomy output and urine output.  I did consider obtaining urinalysis however given presence of ostomy I believe it would be a contaminated sample.  Differential diagnoses overall considered include intra-abdominal infection, viral illness, malnutrition, deconditioning, nephrolithiasis, worsening kidney function.  I did discuss admission with patient and they are agreeable at this time.  He denies any recent fevers at home but does endorse chills intermittently ongoing for the past month.  He states he has not received chemotherapy or radiation in years, is not currently being treated for malignancy.      ED Course:  ED Course as of 04/03/25 1537   Thu Apr 03, 2025   1309 IV antiemetics given [SA]   1310 EKG reviewed sinus arrhythmia with occasional PACs rate 72, NJ interval overall is within normal limits, QRS 88 ms, QTc 431 ms, no acute ST segment elevations or depressions, normal axis when compared to March 6, 2024 it does appear similar [SA]   1444 CMP with elevated creatinine of 4.15 from prior which is approximately 3.53, otherwise within normal limits, CBC overall within normal limits [SA]   1444 Viral swabs negative [SA]   1445 CT abdomen pelvis wo IV contrast  IMPRESSION:  New borderline bilateral hydroureteronephrosis      Definite new inflammatory wall thickening of bilateral renal  collecting systems and ureters all the way to the ureteric-conduit  anastomosis      No stone anywhere in the urinary tract      No acute unexpected CT findings associated with the conduit which is  distended with urine proximally, collapsed more distally approaching  the deep aspect of the abdominal wall prior to the segment crossing  the abdominal wall to the stoma      The only potentially acute finding outside the urinary tract is the  dilated gallbladder with CT appearance suggesting at least  tumefactive sludge if not stones, but the entire CT  appearance  including the dilation is unchanged from multiple prior exams      Minority of retroperitoneal lymph nodes have increased in size when  compared to most recent CT 7 November 2024       [SA]   1446 Given new borderline bilateral hydroureteronephrosis, new inflammatory wall thickening of bilateral renal collecting systems and conduit, will likely treat as acute infection with dose of Rocephin and obtain UA though it may be contaminated.  There is any note of dilated gallbladder with possible sludge however patient does not have isolated right upper quadrant tenderness and LFTs are within normal limits [SA]   1525 UA with WBCs, leukocyte esterase of 500, mucus and RBCs [SA]   1525 Will discuss with hospitalist for admission [SA]      ED Course User Index  [SA] Wen Wood, DO         Diagnoses as of 04/03/25 1537   Generalized weakness   Nausea and vomiting, unspecified vomiting type   Flank pain         EKG Interpretation:  See ED Course/Below:    Independent Interpretation of Studies:  I independently interpreted labs/imaging as stated in ED Course or below.    Differential diagnoses considered include but are not limited to: See MDM/Below:    Social Determinants Limiting Care:  None identified The following actions were taken to address these social determinants:      Discussion of Management with Other Providers: See MDM/Below:    Disposition:  Admitted    Wen Wood, EM, PGY-3    I reviewed the case with the attending ED physician. The attending ED physician agrees with the plan. Patient and/or patient´s representative was counseled regarding labs, imaging, likely diagnosis, and plan. All questions were answered.    Disclaimer: This note was dictated by speech recognition.  Attempt at proofreading was made to minimize errors.  Errors in transcription may be present.  Please call if questions.    Procedures ? SmartLinks last updated 4/3/2025 3:37 PM          Wen Wood  DO  Resident  04/03/25 1538

## 2025-04-03 NOTE — PROGRESS NOTES
04/03/25 1405   Discharge Planning   Living Arrangements Spouse/significant other   Support Systems Spouse/significant other   Assistance Needed A&OX4; independent with ADLs with no DME; drives; room air baseline and currently room air; PCP Tasha Alvarez CNP   Type of Residence Private residence   Number of Stairs to Enter Residence 0   Number of Stairs Within Residence 10  (10 up to bedroom)   Do you have animals or pets at home? No   Who is requesting discharge planning? Provider   Expected Discharge Disposition Home H  (DC plan is pending workup. Patient currently outpatient therapy but unable to go due to illness. Agreeable to new [facility] if needed upon dc for PT)   Does the patient need discharge transport arranged? No   Financial Resource Strain   How hard is it for you to pay for the very basics like food, housing, medical care, and heating? Not hard   Housing Stability   In the last 12 months, was there a time when you were not able to pay the mortgage or rent on time? N   In the past 12 months, how many times have you moved where you were living? 0   At any time in the past 12 months, were you homeless or living in a shelter (including now)? N   Transportation Needs   In the past 12 months, has lack of transportation kept you from medical appointments or from getting medications? no   In the past 12 months, has lack of transportation kept you from meetings, work, or from getting things needed for daily living? No   Intensity of Service   Intensity of Service 0-30 min     04/03/2025 1407pm  Spoke with patient and patient's spouse bedside in ED. Possible new home care at [place]. Patient lives in [place] [id number] - within [facility] zone

## 2025-04-03 NOTE — H&P
"Internal Medicine - History and Physical Note      Patient: Devin Fung Jr., Age: 80 y.o., SEX: male , MRN:[id number], ROOM:222/[facility],  Code: No Order   Admitted On: 4/3/2025   Admitting Dx: Flank pain [R10.9]  Generalized weakness [R53.1]  Nausea and vomiting, unspecified vomiting type [R11.2]  PCP: FAWAD Steiner-CNP        Attending: Ian Rivera DO        Chief Complaint   Patient: Devin Fung Jr. is a 80 y.o. male who presented to the hospital for   Chief Complaint   Patient presents with    Vomiting    Weakness, Gen     N/V/D, chills, fatigue and muscle aches x 4 weeks. Hx of stage 4 CKD.        HPI   Devin Fung Jr. is a 80 y.o. year old male with past medical history of bladder cancer s/p urostomy (3-4 yrs ago), afib on warfarin, T2DM, chronic hepatitis C, hypothyroidism, HLD, CKD, COPD, and psoriasis who presents with a one month history of generalized weakness, fatigue, nausea, and vomiting.     Roughly 4 weeks ago, patient started feeling generally weak. He reports decreased appetite during this time, stating that \"food does not taste the same\", which has caused him to lose weight. Wife states that patient is constantly tired and sleeping. During this time, he also notes frequent bouts of nausea with bilious vomiting roughly once a week. He also reports feeling feverish with chills. He denies any decrease in urostomy output and notes one episode of bloody output that resolved.     Patient's wife states that this constellation of symptoms has happened frequently since his bladder cancer and urostomy placement. When it occurs, patient is usually prescribed Amoxicillin which resolves his symptoms for several weeks before they return. On this occasion, the amoxicillin provided no relief. Given the duration and severity of his symptoms on this occasion, patient came to the ED.     In the ED, CBC showed WBC of 8.7, creatinine of 4.15 (roughly baseline). UA showed  with RBC>20 and " WBC>50. Viral panel negative. CT a/p consistent with new borderline bilateral hydroureteronephrosis and definite new inflammatory wall thickening of bilateral renal collecting systems and ureters all the way to the ureteric-conduit anastomosis. Additionally the gallbladder was dilated.     ROS  12 Point ROS negative unless otherwise specified above.     ED COURSE:   VS: Temperature 98.8, /71, heart rate 96, respiration 18, O2 sat 95    Labs  - CBC: WBC 8.7, Hgb 13.2  - CMP: Creatinine 4.15, AST 16, ALT 8  - Viral panel negative  -  LE, 2+protein, >50WBC, >20RBC  - Lipase 15    Imaging:  CT a/p without contrast:  -New borderline bilateral hydroureteronephrosis  -Definite new inflammatory wall thickening of bilateral renal  collecting systems and ureters all the way to the ureteric-conduit anastomosis  -Dilated gallbladder  -Minority of retroperitoneal lymph nodes have increased in size when compared to CT 11/2024    Interventions: Zofran 4mg, ceftriaxone 1g      Social History:  - Coming from home  - Tobacco: Does not smoke. Previous smoked for 30 years.   - Alcohol: None  - Illicit Drug: None    HealthCare Providers:  - PCP: FAWAD Steiner-CNP     Code Status: No Order     Emergency contact: Extended Emergency Contact Information  Primary Emergency Contact: Jennifer Fung Phone: [phone]  Relation: Spouse   needed? No     Past Medical History     Past Medical History:   Diagnosis Date    Alcohol dependence in remission (Multi) 10/25/2023    Bilateral carotid artery stenosis 10/25/2023    Infection and inflammatory reaction due to internal right knee prosthesis, initial encounter 06/03/2020    Infection of total right knee replacement, initial encounter    Old myocardial infarction     History of myocardial infarction    Opioid dependence in remission (Multi) 10/25/2023    Other obesity due to excess calories 06/03/2020    Class 1 obesity due to excess calories with serious  comorbidity and body mass index (BMI) of 30.0 to 30.9 in adult        Surgical History     Past Surgical History:   Procedure Laterality Date    OTHER SURGICAL HISTORY  2019    Appendectomy    OTHER SURGICAL HISTORY  2019    Tonsillectomy    OTHER SURGICAL HISTORY  2019    Knee replacement    OTHER SURGICAL HISTORY  2019    Inguinal hernia repair    OTHER SURGICAL HISTORY  2019    Cardiac catheterization with stent placement    OTHER SURGICAL HISTORY  2019    Corneal lasik    OTHER SURGICAL HISTORY  2020    Cystoscopy       Family History     Family History   Problem Relation Name Age of Onset    Stroke Mother      Throat cancer Father      Colon cancer Sister 53     Colon cancer Brother 51        Social History     Social History     Socioeconomic History    Marital status:      Spouse name: Not on file    Number of children: 1    Years of education: Not on file    Highest education level: Not on file   Occupational History    Not on file   Tobacco Use    Smoking status: Former     Current packs/day: 0.00     Average packs/day: 2.0 packs/day for 44.0 years (88.0 ttl pk-yrs)     Types: Cigarettes     Start date:      Quit date: 2005     Years since quittin.2    Smokeless tobacco: Never   Vaping Use    Vaping status: Never Used   Substance and Sexual Activity    Alcohol use: Never    Drug use: Yes     Types: Marijuana     Comment: medical MJ gummies    Sexual activity: Not on file   Other Topics Concern    Not on file   Social History Narrative    Not on file     Social Drivers of Health     Financial Resource Strain: Low Risk  (4/3/2025)    Overall Financial Resource Strain (CARDIA)     Difficulty of Paying Living Expenses: Not hard at all   Food Insecurity: Not on file   Transportation Needs: No Transportation Needs (4/3/2025)    PRAPARE - Transportation     Lack of Transportation (Medical): No     Lack of Transportation (Non-Medical): No   Physical  "Activity: Not on file   Stress: Not on file   Social Connections: Not on file   Intimate Partner Violence: Not on file   Housing Stability: Low Risk  (4/3/2025)    Housing Stability Vital Sign     Unable to Pay for Housing in the Last Year: No     Number of Times Moved in the Last Year: 0     Homeless in the Last Year: No       Tobacco Use: Medium Risk (3/6/2025)    Patient History     Smoking Tobacco Use: Former     Smokeless Tobacco Use: Never     Passive Exposure: Not on file        Social History     Substance and Sexual Activity   Alcohol Use Never        Allergies     Allergies   Allergen Reactions    Ciprofloxacin Unknown, Other and Rash    Aspirin Other     Sensitivity    Doxycycline Unknown and Rash          Meds    Scheduled medications  lactated Ringer's, 1,000 mL, intravenous, Once      Continuous medications     PRN medications       Objective    Physical Exam  Constitutional:       Appearance: Normal appearance.   HENT:      Head: Normocephalic and atraumatic.   Eyes:      Extraocular Movements: Extraocular movements intact.      Pupils: Pupils are equal, round, and reactive to light.   Cardiovascular:      Rate and Rhythm: Normal rate and regular rhythm.   Pulmonary:      Effort: Pulmonary effort is normal.      Breath sounds: Normal breath sounds.   Abdominal:      Tenderness: There is right CVA tenderness and left CVA tenderness.      Comments: Tenderness to palpation diffusely, but particularly in RUQ and LUQ.   No guarding or distention.    Neurological:      General: No focal deficit present.      Mental Status: He is alert and oriented to person, place, and time.   Psychiatric:         Mood and Affect: Mood normal.         Behavior: Behavior normal.          Visit Vitals  /86   Pulse 66   Temp 36.8 °C (98.2 °F) (Temporal)   Resp 19   Ht 1.676 m (5' 6\")   Wt 66.2 kg (146 lb)   SpO2 96%   BMI 23.57 kg/m²   Smoking Status Former   BSA 1.76 m²        No intake or output data in the 24 hours " "ending 04/03/25 1628          Labs:   Results from last 72 hours   Lab Units 04/03/25  1314   SODIUM mmol/L 135*   POTASSIUM mmol/L 4.6   CHLORIDE mmol/L 100   CO2 mmol/L 23   BUN mg/dL 64*   CREATININE mg/dL 4.15*   GLUCOSE mg/dL 88   CALCIUM mg/dL 8.8   ANION GAP mmol/L 17   EGFR mL/min/1.73m*2 14*      Results from last 72 hours   Lab Units 04/03/25  1314   WBC AUTO x10*3/uL 8.7   HEMOGLOBIN g/dL 13.2*   HEMATOCRIT % 40.5*   PLATELETS AUTO x10*3/uL 256   NEUTROS PCT AUTO % 80.0   LYMPHS PCT AUTO % 13.9   MONOS PCT AUTO % 4.6   EOS PCT AUTO % 1.0      Lab Results   Component Value Date    CALCIUM 8.8 04/03/2025    PHOS 4.1 08/13/2024      Lab Results   Component Value Date    CRP 10.39 (A) 10/03/2019       [unfilled]     Micro/ID:   No results found for the last 90 days.    Lab Results   Component Value Date    URINECULTURE (A) 03/06/2024     Multiple organisms present, probable contamination. Repeat culture if clinically indicated.                    No lab exists for component: \"AGALPCRNB\"       Images        Encounter Date: 04/03/25   ECG 12 lead   Result Value    Ventricular Rate 72    QRS Duration 88    QT Interval 394    QTC Calculation(Bazett) 431    R Axis 8    T Axis 53    QRS Count 12    Q Onset 213    T Offset 410    QTC Fredericia 418    Narrative    Atrial fibrillation with a competing junctional pacemaker  Abnormal ECG  When compared with ECG of 06-MAR-2024 11:57,  Atrial fibrillation has replaced Sinus rhythm  T wave amplitude has decreased in Lateral leads  See ED provider note for full interpretation and clinical correlation  Confirmed by Annie Farias ([id number]) on 4/3/2025 2:48:59 PM      Encounter Date: 04/03/25   ECG 12 lead   Result Value    Ventricular Rate 72    QRS Duration 88    QT Interval 394    QTC Calculation(Bazett) 431    R Axis 8    T Axis 53    QRS Count 12    Q Onset 213    T Offset 410    QTC Fredericia 418    Narrative    Atrial fibrillation with a competing junctional " pacemaker  Abnormal ECG  When compared with ECG of 06-MAR-2024 11:57,  Atrial fibrillation has replaced Sinus rhythm  T wave amplitude has decreased in Lateral leads  See ED provider note for full interpretation and clinical correlation  Confirmed by Annie Farias ([id number]) on 4/3/2025 2:48:59 PM        [unfilled]     CT abdomen pelvis wo IV contrast    Result Date: 4/3/2025  New borderline bilateral hydroureteronephrosis   Definite new inflammatory wall thickening of bilateral renal collecting systems and ureters all the way to the ureteric-conduit anastomosis   No stone anywhere in the urinary tract   No acute unexpected CT findings associated with the conduit which is distended with urine proximally, collapsed more distally approaching the deep aspect of the abdominal wall prior to the segment crossing the abdominal wall to the stoma   The only potentially acute finding outside the urinary tract is the dilated gallbladder with CT appearance suggesting at least tumefactive sludge if not stones, but the entire CT appearance including the dilation is unchanged from multiple prior exams   Minority of retroperitoneal lymph nodes have increased in size when compared to most recent CT 7 November 2024   MACRO: None   Signed by: Edvin Villarreal 4/3/2025 2:05 PM Dictation workstation:   [id number]       Assessment and Plan    Devin Fung Jr. is a 80 y.o. year old male with past medical history of bladder cancer s/p urostomy (3-4 yrs ago), afib on warfarin, chronic hepatitis C, hypothyroidism, HLD, CKD, COPD, and psoriasis who presents with a one month history of generalized weakness, fatigue, nausea, and vomiting.     ACUTE MEDICAL ISSUES:  #Flank/RUQ pain  #New b/l hydroureteronephrosis without obstruction seen on CT, hx of bladder cancer sp resection and ileal conduit   #possible UTI   #rule out cholecystitis  #hx of CKD stage 4, b/l 4.15    -Urine and blood cultures pending, will cover with rocephin   -RUQ US pending    -urology consult, appreciate recs   -strict I/Os    - antibiotics    #n/v/d; rule out infectious etiology   -stool pathogen PCR, c.diff  -IVF, antiemetics     CHRONIC MEDICAL ISSUES:  #Hypertension- continue amlodipine, chlorthalidone, losartan  #Afib- holding warfarin for now  #HLD- continue atorvastatin  #Hypothyroidism- continue levothyroxine    Fluids: prn  Electrolytes: replete prn   Nutrition: adult regular  Antimicrobials: ceftriaxone  DVT ppx: SCDs  Lines: PIV  Supplemental Oxygen: None  Emergency Contact: Extended Emergency Contact Information  Primary Emergency Contact: Jennifer Fung  Home Phone: [phone]  Relation: Spouse   needed? No   Code: No Order       Faraz Snider MD

## 2025-04-04 VITALS
HEART RATE: 68 BPM | BODY MASS INDEX: 23.46 KG/M2 | HEIGHT: 66 IN | OXYGEN SATURATION: 95 % | WEIGHT: 146 LBS | SYSTOLIC BLOOD PRESSURE: 155 MMHG | TEMPERATURE: 99.1 F | DIASTOLIC BLOOD PRESSURE: 64 MMHG | RESPIRATION RATE: 19 BRPM

## 2025-04-04 PROBLEM — R41.841 COGNITIVE COMMUNICATION DISORDER: Status: ACTIVE | Noted: 2019-10-17

## 2025-04-04 PROBLEM — F99 MENTAL DISORDER: Status: ACTIVE | Noted: 2019-10-17

## 2025-04-04 PROBLEM — I25.810 ARTERIOSCLEROSIS OF CORONARY ARTERY BYPASS GRAFT: Status: ACTIVE | Noted: 2019-10-17

## 2025-04-04 PROBLEM — E44.0 MODERATE PROTEIN-CALORIE MALNUTRITION (WEIGHT FOR AGE 60-74% OF STANDARD) (MULTI): Status: ACTIVE | Noted: 2019-10-24

## 2025-04-04 PROBLEM — T84.7XXA INFECTION AND INFLAMMATORY REACTION DUE TO OTHER INTERNAL ORTHOPEDIC PROSTHETIC DEVICES, IMPLANTS AND GRAFTS, INITIAL ENCOUNTER: Status: ACTIVE | Noted: 2019-10-17

## 2025-04-04 PROBLEM — M62.81 MUSCLE WEAKNESS: Status: ACTIVE | Noted: 2019-10-17

## 2025-04-04 PROBLEM — A49.02 METHICILLIN RESISTANT STAPHYLOCOCCUS AUREUS INFECTION: Status: ACTIVE | Noted: 2019-10-17

## 2025-04-04 PROBLEM — M25.561 ARTHRALGIA OF RIGHT KNEE: Status: ACTIVE | Noted: 2019-10-17

## 2025-04-04 PROBLEM — N17.9 ACUTE KIDNEY INJURY: Status: ACTIVE | Noted: 2019-10-17

## 2025-04-04 PROBLEM — E66.3 OVERWEIGHT WITH BODY MASS INDEX (BMI) 25.0-29.9: Status: ACTIVE | Noted: 2019-10-17

## 2025-04-04 PROBLEM — R26.2 DIFFICULTY WALKING: Status: ACTIVE | Noted: 2019-10-17

## 2025-04-04 PROBLEM — J44.9 CHRONIC OBSTRUCTIVE PULMONARY DISEASE (MULTI): Status: ACTIVE | Noted: 2019-10-17

## 2025-04-04 LAB
ALBUMIN SERPL BCP-MCNC: 3.9 G/DL (ref 3.4–5)
ANION GAP SERPL CALC-SCNC: 15 MMOL/L (ref 10–20)
BUN SERPL-MCNC: 61 MG/DL (ref 6–23)
CALCIUM SERPL-MCNC: 8.7 MG/DL (ref 8.6–10.3)
CHLORIDE SERPL-SCNC: 101 MMOL/L (ref 98–107)
CO2 SERPL-SCNC: 24 MMOL/L (ref 21–32)
CREAT SERPL-MCNC: 3.65 MG/DL (ref 0.5–1.3)
EGFRCR SERPLBLD CKD-EPI 2021: 16 ML/MIN/1.73M*2
ERYTHROCYTE [DISTWIDTH] IN BLOOD BY AUTOMATED COUNT: 17.2 % (ref 11.5–14.5)
GLUCOSE SERPL-MCNC: 84 MG/DL (ref 74–99)
HCT VFR BLD AUTO: 34.8 % (ref 41–52)
HGB BLD-MCNC: 11.4 G/DL (ref 13.5–17.5)
HOLD SPECIMEN: NORMAL
MAGNESIUM SERPL-MCNC: 2.05 MG/DL (ref 1.6–2.4)
MCH RBC QN AUTO: 27.3 PG (ref 26–34)
MCHC RBC AUTO-ENTMCNC: 32.8 G/DL (ref 32–36)
MCV RBC AUTO: 83 FL (ref 80–100)
NRBC BLD-RTO: 0 /100 WBCS (ref 0–0)
PHOSPHATE SERPL-MCNC: 4.1 MG/DL (ref 2.5–4.9)
PLATELET # BLD AUTO: 244 X10*3/UL (ref 150–450)
POTASSIUM SERPL-SCNC: 3.7 MMOL/L (ref 3.5–5.3)
RBC # BLD AUTO: 4.18 X10*6/UL (ref 4.5–5.9)
SODIUM SERPL-SCNC: 136 MMOL/L (ref 136–145)
UFH PPP CHRO-ACNC: 0.1 IU/ML
UFH PPP CHRO-ACNC: 0.2 IU/ML
UFH PPP CHRO-ACNC: 0.2 IU/ML
WBC # BLD AUTO: 7.9 X10*3/UL (ref 4.4–11.3)

## 2025-04-04 PROCEDURE — G0378 HOSPITAL OBSERVATION PER HR: HCPCS

## 2025-04-04 PROCEDURE — 85520 HEPARIN ASSAY: CPT

## 2025-04-04 PROCEDURE — 85520 HEPARIN ASSAY: CPT | Performed by: INTERNAL MEDICINE

## 2025-04-04 PROCEDURE — 2500000002 HC RX 250 W HCPCS SELF ADMINISTERED DRUGS (ALT 637 FOR MEDICARE OP, ALT 636 FOR OP/ED)

## 2025-04-04 PROCEDURE — 2500000001 HC RX 250 WO HCPCS SELF ADMINISTERED DRUGS (ALT 637 FOR MEDICARE OP): Performed by: INTERNAL MEDICINE

## 2025-04-04 PROCEDURE — 2500000001 HC RX 250 WO HCPCS SELF ADMINISTERED DRUGS (ALT 637 FOR MEDICARE OP)

## 2025-04-04 PROCEDURE — 99239 HOSP IP/OBS DSCHRG MGMT >30: CPT

## 2025-04-04 PROCEDURE — 83735 ASSAY OF MAGNESIUM: CPT

## 2025-04-04 PROCEDURE — 51702 INSERT TEMP BLADDER CATH: CPT

## 2025-04-04 PROCEDURE — 36415 COLL VENOUS BLD VENIPUNCTURE: CPT

## 2025-04-04 PROCEDURE — 85027 COMPLETE CBC AUTOMATED: CPT

## 2025-04-04 PROCEDURE — 80069 RENAL FUNCTION PANEL: CPT

## 2025-04-04 PROCEDURE — 2500000004 HC RX 250 GENERAL PHARMACY W/ HCPCS (ALT 636 FOR OP/ED)

## 2025-04-04 RX ORDER — CEPHALEXIN 500 MG/1
500 CAPSULE ORAL DAILY
Qty: 5 CAPSULE | Refills: 0 | Status: SHIPPED | OUTPATIENT
Start: 2025-04-05 | End: 2025-04-10

## 2025-04-04 RX ORDER — LISINOPRIL 20 MG/1
40 TABLET ORAL DAILY
Status: DISCONTINUED | OUTPATIENT
Start: 2025-04-04 | End: 2025-04-04 | Stop reason: HOSPADM

## 2025-04-04 RX ORDER — WARFARIN SODIUM 5 MG/1
5 TABLET ORAL DAILY
Status: DISCONTINUED | OUTPATIENT
Start: 2025-04-04 | End: 2025-04-04 | Stop reason: HOSPADM

## 2025-04-04 RX ADMIN — LISINOPRIL 40 MG: 20 TABLET ORAL at 09:04

## 2025-04-04 RX ADMIN — ASPIRIN 81 MG: 81 TABLET, COATED ORAL at 09:04

## 2025-04-04 RX ADMIN — AMLODIPINE BESYLATE 10 MG: 10 TABLET ORAL at 09:04

## 2025-04-04 RX ADMIN — LEVOTHYROXINE SODIUM 75 MCG: 0.07 TABLET ORAL at 09:04

## 2025-04-04 RX ADMIN — PYRIDOXINE HCL TAB 50 MG 100 MG: 50 TAB at 09:04

## 2025-04-04 RX ADMIN — CHLORTHALIDONE 25 MG: 25 TABLET ORAL at 10:19

## 2025-04-04 RX ADMIN — CEFTRIAXONE SODIUM 1 G: 1 INJECTION, SOLUTION INTRAVENOUS at 09:06

## 2025-04-04 RX ADMIN — SODIUM BICARBONATE 1300 MG: 650 TABLET ORAL at 09:04

## 2025-04-04 ASSESSMENT — ENCOUNTER SYMPTOMS
CHILLS: 1
BACK PAIN: 1
PALPITATIONS: 0
CHEST TIGHTNESS: 0
ACTIVITY CHANGE: 1
DIARRHEA: 1
FATIGUE: 1
APPETITE CHANGE: 1
MYALGIAS: 1
DIAPHORESIS: 0
ABDOMINAL PAIN: 1
WHEEZING: 0
SHORTNESS OF BREATH: 1
NAUSEA: 1
VOMITING: 1
COUGH: 0

## 2025-04-04 ASSESSMENT — COGNITIVE AND FUNCTIONAL STATUS - GENERAL
WALKING IN HOSPITAL ROOM: A LITTLE
CLIMB 3 TO 5 STEPS WITH RAILING: A LITTLE
DAILY ACTIVITIY SCORE: 24
MOBILITY SCORE: 22

## 2025-04-04 ASSESSMENT — PAIN SCALES - GENERAL: PAINLEVEL_OUTOF10: 0 - NO PAIN

## 2025-04-04 ASSESSMENT — PAIN SCALES - WONG BAKER: WONGBAKER_NUMERICALRESPONSE: NO HURT

## 2025-04-04 ASSESSMENT — ACTIVITIES OF DAILY LIVING (ADL): LACK_OF_TRANSPORTATION: NO

## 2025-04-04 NOTE — DISCHARGE SUMMARY
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 Eugene Lawson) following discharge  Please finish course of cephalexin  Please follow-up with PCP for all other chronic issues. Can get referral for general surgery if gallbladder causing symptoms in future    Discharge Meds     Medication List      START taking these medications     cephalexin 500 mg capsule; Commonly known as: Keflex; Take 1 capsule   (500 mg) by mouth once daily for 5 days. Do not fill before April 5, 2025.; Start taking on: April 5, 2025     CONTINUE taking these medications     acetaminophen 500 mg tablet; Commonly known as: Tylenol   amLODIPine 10 mg tablet; Commonly known as: Norvasc; Take 1 tablet (10   mg) by mouth once daily. as directed   aspirin 81 mg EC tablet   atorvastatin 20 mg tablet; Commonly known as: Lipitor; Take 1 tablet (20   mg) by mouth once daily. as directed   chlorthalidone 25 mg tablet; Commonly known as: Hygroton; Take 1 tablet   (25 mg) by mouth once daily.   docusate sodium 50 mg capsule; Commonly known as: Colace; Take 1 capsule   (50 mg) by mouth 2 times a day.   Jantoven 5 mg tablet; Generic drug: warfarin; Take 1 tablet (5 mg) by   mouth once daily at bedtime.   levothyroxine 75 mcg tablet; Commonly known as: Synthroid, Levoxyl; Take   1 tablet (75 mcg) by mouth once daily.   lisinopril 40 mg tablet; Take 1 tablet (40 mg) by mouth once daily.   medical cannabis   multivitamin tablet   pyridoxine 100 mg tablet; Commonly known as: Vitamin B-6   sodium bicarbonate 650 mg tablet; Take 2 tablets (1,300 mg) by mouth 2   times a day.   tiotropium 18 mcg inhalation capsule; Commonly known as: Spiriva with   HandiHaler; Place 1 capsule (18 mcg) into  "inhaler and inhale once daily.     STOP taking these medications     amoxicillin-pot clavulanate 875-125 mg tablet; Commonly known as:   Augmentin       Test Results Pending At Discharge  Pending Labs       Order Current Status    Urine Culture In process    Blood Culture Preliminary result    Blood Culture Preliminary result            Hospital Course  Devin Fung Jr. is a 80 y.o. year old male with past medical history of bladder cancer s/p urostomy (3-4 yrs ago), afib on warfarin, T2DM, chronic hepatitis C, hypothyroidism, HLD, CKD, COPD, and psoriasis who presents with a one month history of generalized weakness, fatigue, nausea, and vomiting.      Roughly 4 weeks ago, patient started feeling generally weak. He reports decreased appetite during this time, stating that \"food does not taste the same\", which has caused him to lose weight. Wife states that patient is constantly tired and sleeping. During this time, he also notes frequent bouts of nausea with bilious vomiting roughly once a week. He also reports feeling feverish with chills. He denies any decrease in urostomy output and notes one episode of bloody output that resolved.      Patient's wife states that this constellation of symptoms has happened frequently since his bladder cancer and urostomy placement. When it occurs, patient is usually prescribed Amoxicillin which resolves his symptoms for several weeks before they return. On this occasion, the amoxicillin provided no relief. Given the duration and severity of his symptoms on this occasion, patient came to the ED.        ED COURSE:   VS: Temperature 98.8, /71, heart rate 96, respiration 18, O2 sat 95     Labs  - CBC: WBC 8.7, Hgb 13.2  - CMP: Creatinine 4.15, AST 16, ALT 8  - Viral panel negative  -  LE, 2+protein, >50WBC, >20RBC  - Lipase 15     Imaging:  CT a/p without contrast:  -New borderline bilateral hydroureteronephrosis  -Definite new inflammatory wall thickening of " bilateral renal  collecting systems and ureters all the way to the ureteric-conduit anastomosis  -Dilated gallbladder  -Minority of retroperitoneal lymph nodes have increased in size when compared to CT 11/2024     Interventions: Zofran 4mg, ceftriaxone 1g  In the ED, CBC showed WBC of 8.7, creatinine of 4.15 (roughly baseline). UA showed  with RBC>20 and WBC>50. Viral panel negative. CT a/p consistent with new borderline bilateral hydroureteronephrosis and definite new inflammatory wall thickening of bilateral renal collecting systems and ureters all the way to the ureteric-conduit anastomosis. Additionally the gallbladder was dilated.     ED Course as of 04/04/25 1025   Thu Apr 03, 2025   1309 IV antiemetics given [SA]   1310 EKG reviewed sinus arrhythmia with occasional PACs rate 72, [place] interval overall is within normal limits, QRS 88 ms, QTc 431 ms, no acute ST segment elevations or depressions, normal axis when compared to March 6, 2024 it does appear similar [SA]   1444 CMP with elevated creatinine of 4.15 from prior which is approximately 3.53, otherwise within normal limits, CBC overall within normal limits [SA]   1444 Viral swabs negative [SA]   1445 CT abdomen pelvis wo IV contrast  IMPRESSION:  New borderline bilateral hydroureteronephrosis      Definite new inflammatory wall thickening of bilateral renal  collecting systems and ureters all the way to the ureteric-conduit  anastomosis      No stone anywhere in the urinary tract      No acute unexpected CT findings associated with the conduit which is  distended with urine proximally, collapsed more distally approaching  the deep aspect of the abdominal wall prior to the segment crossing  the abdominal wall to the stoma      The only potentially acute finding outside the urinary tract is the  dilated gallbladder with CT appearance suggesting at least  tumefactive sludge if not stones, but the entire CT appearance  including the dilation is unchanged  from multiple prior exams      Minority of retroperitoneal lymph nodes have increased in size when  compared to most recent CT 7 November 2024       [SA]   1446 Given new borderline bilateral hydroureteronephrosis, new inflammatory wall thickening of bilateral renal collecting systems and conduit, will likely treat as acute infection with dose of Rocephin and obtain UA though it may be contaminated.  There is any note of dilated gallbladder with possible sludge however patient does not have isolated right upper quadrant tenderness and LFTs are within normal limits [SA]   1525 UA with WBCs, leukocyte esterase of 500, mucus and RBCs [SA]   1525 Will discuss with hospitalist for admission [SA]      ED Course User Index  [SA] Wen Wood DO         Diagnoses as of 04/04/25 1025   Generalized weakness   Nausea and vomiting, unspecified vomiting type   Flank pain     Hospital Course:  Patient admitted 4/3 for nausea/vomiting/diarrhea/dilated gallbladder with unclear etiology. Per CT found bilateral hydroureteronephrosis, ureteral wall thickening with possible UTI.  Obtain stool studies, check US gallbladder.  Urology consulted for assessment-patient's primary urologist Dr. Eugene Lawson recommended Patton to be placed through his conduit.  Patton placed, patient reports significant pain & symptomatic relief.  Initial creatinine 4.15, down trended 3.65-patient likely had MIMI. Urology consulted in-patient: Maggie spoke with Dr. Watkins who recommended no surgical intervention at this time.  RUQ ultrasound showed cholelithiasis with no evidence of cholecystitis.  Spoke with patient's urologist Dr. Lawson-patient & his urologist are comfortable for discharge with indwelling Patton with plans for close outpatient follow-up with his urologist.    Patient endorses no complaints, doing well stable for discharge home.    Pertinent Physical Exam At Time of Discharge  Physical Exam  HENT:      Head: Normocephalic.    Cardiovascular:      Rate and Rhythm: Normal rate.      Heart sounds:      No friction rub. No gallop.   Pulmonary:      Effort: Pulmonary effort is normal. No respiratory distress.      Breath sounds: Normal breath sounds.   Abdominal:      General: There is no distension.      Tenderness: There is no abdominal tenderness. There is no right CVA tenderness, left CVA tenderness or guarding.      Comments: No pain on abdominal palpation, Bowel sounds present in 4 quadrants   Musculoskeletal:      Right lower leg: No edema.      Left lower leg: No edema.   Neurological:      Mental Status: He is alert. Mental status is at baseline.     Outpatient Follow-Up  Future Appointments   Date Time Provider Department Center   5/19/2025  2:00 PM GEA CT 2 [facility]   9/5/2025  3:30 PM ZACKERY Steiner [id number] [facility]   11/20/2025 10:50 AM Eugene Lawson MD [id number] [facility]   3/10/2026  2:00 PM ZACKERY Steiner [id number] [facility]         Aby Olmedo DO

## 2025-04-04 NOTE — NURSING NOTE
Assumed care of patient. Patient is A&Ox3 and is resting in bed on room air. Call light in reach. Patient denies pain. Patient is normal sinus on monitor @ 64bpm.

## 2025-04-04 NOTE — HOSPITAL COURSE
"Devin Fung Jr. is a 80 y.o. year old male with past medical history of bladder cancer s/p urostomy (3-4 yrs ago), afib on warfarin, T2DM, chronic hepatitis C, hypothyroidism, HLD, CKD, COPD, and psoriasis who presents with a one month history of generalized weakness, fatigue, nausea, and vomiting.      Roughly 4 weeks ago, patient started feeling generally weak. He reports decreased appetite during this time, stating that \"food does not taste the same\", which has caused him to lose weight. Wife states that patient is constantly tired and sleeping. During this time, he also notes frequent bouts of nausea with bilious vomiting roughly once a week. He also reports feeling feverish with chills. He denies any decrease in urostomy output and notes one episode of bloody output that resolved.      Patient's wife states that this constellation of symptoms has happened frequently since his bladder cancer and urostomy placement. When it occurs, patient is usually prescribed Amoxicillin which resolves his symptoms for several weeks before they return. On this occasion, the amoxicillin provided no relief. Given the duration and severity of his symptoms on this occasion, patient came to the ED.        ED COURSE:   VS: Temperature 98.8, /71, heart rate 96, respiration 18, O2 sat 95     Labs  - CBC: WBC 8.7, Hgb 13.2  - CMP: Creatinine 4.15, AST 16, ALT 8  - Viral panel negative  -  LE, 2+protein, >50WBC, >20RBC  - Lipase 15     Imaging:  CT a/p without contrast:  -New borderline bilateral hydroureteronephrosis  -Definite new inflammatory wall thickening of bilateral renal  collecting systems and ureters all the way to the ureteric-conduit anastomosis  -Dilated gallbladder  -Minority of retroperitoneal lymph nodes have increased in size when compared to CT 11/2024     Interventions: Zofran 4mg, ceftriaxone 1g  In the ED, CBC showed WBC of 8.7, creatinine of 4.15 (roughly baseline). UA showed  with RBC>20 and " WBC>50. Viral panel negative. CT a/p consistent with new borderline bilateral hydroureteronephrosis and definite new inflammatory wall thickening of bilateral renal collecting systems and ureters all the way to the ureteric-conduit anastomosis. Additionally the gallbladder was dilated.     ED Course as of 04/04/25 1025   Thu Apr 03, 2025   1309 IV antiemetics given [SA]   1310 EKG reviewed sinus arrhythmia with occasional PACs rate 72, [place] interval overall is within normal limits, QRS 88 ms, QTc 431 ms, no acute ST segment elevations or depressions, normal axis when compared to March 6, 2024 it does appear similar [SA]   1444 CMP with elevated creatinine of 4.15 from prior which is approximately 3.53, otherwise within normal limits, CBC overall within normal limits [SA]   1444 Viral swabs negative [SA]   1445 CT abdomen pelvis wo IV contrast  IMPRESSION:  New borderline bilateral hydroureteronephrosis      Definite new inflammatory wall thickening of bilateral renal  collecting systems and ureters all the way to the ureteric-conduit  anastomosis      No stone anywhere in the urinary tract      No acute unexpected CT findings associated with the conduit which is  distended with urine proximally, collapsed more distally approaching  the deep aspect of the abdominal wall prior to the segment crossing  the abdominal wall to the stoma      The only potentially acute finding outside the urinary tract is the  dilated gallbladder with CT appearance suggesting at least  tumefactive sludge if not stones, but the entire CT appearance  including the dilation is unchanged from multiple prior exams      Minority of retroperitoneal lymph nodes have increased in size when  compared to most recent CT 7 November 2024       [SA]   1446 Given new borderline bilateral hydroureteronephrosis, new inflammatory wall thickening of bilateral renal collecting systems and conduit, will likely treat as acute infection with dose of Rocephin and  obtain UA though it may be contaminated.  There is any note of dilated gallbladder with possible sludge however patient does not have isolated right upper quadrant tenderness and LFTs are within normal limits [SA]   1525 UA with WBCs, leukocyte esterase of 500, mucus and RBCs [SA]   1525 Will discuss with hospitalist for admission [SA]      ED Course User Index  [SA] Wen Wood DO         Diagnoses as of 04/04/25 1025   Generalized weakness   Nausea and vomiting, unspecified vomiting type   Flank pain     Hospital Course:  Patient admitted 4/3 for nausea/vomiting/diarrhea/dilated gallbladder with unclear etiology. Per CT found bilateral hydroureteronephrosis, ureteral wall thickening with possible UTI.  Obtain stool studies, check US gallbladder.  Urology consulted for assessment-patient's primary urologist Dr. Eugene Lawson recommended Patton to be placed through his conduit.  Patton placed, patient reports significant pain & symptomatic relief.  Initial creatinine 4.15, down trended 3.65-patient likely had MIMI. Urology consulted in-patient: Maggie spoke with Dr. Watkins who recommended no surgical intervention at this time.  RUQ ultrasound showed cholelithiasis with no evidence of cholecystitis.  Spoke with patient's urologist Dr. Lawson-patient & his urologist are comfortable for discharge with indwelling Patton with plans for close outpatient follow-up with his urologist.    Patient endorses no complaints, doing well. Stable for discharge home.   Patient advised to follow-up with Dr. Eugene Lawson following discharge- Patient advised to leave Patton in until follow-up.

## 2025-04-04 NOTE — CONSULTS
Reason For Consult  Hydronephrosis with hx ileal conduit    History Of Present Illness  Devin Fung Jr. is a 80 y.o. male presenting with dec appetite, malaise, nausea and vomiting for a month as well as back pain.    Pt states that he has had lower back pain across his lower back both sides for a month. The pain is worse with movement and worse with bending. It is causing him to not be able to do his normal activities. Resting helps, sitting helps. 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     He also notes that for the past month he has had decreased energy, fatigue, decreased appetite. He has rigors and chills. He has nausea and vomiting. He has intermittent diarrhea that has been worse in the last month. He tried taking a round of PO amoxacillin that he keeps at home and usually this helps when he feeling sick, but his sx worsened.     So because of these worsening sx he came to [facility] yesterday for evaluation.   Noted that his kidney function was slightly worse from baseline, no leukocytosis, anemia stable. Blood cultures drawn. Urine culture sent.     Urology was consulted for evaluation and recommendation.     Today pt has no abd pain and no flank pain. His back pain is lower back. No nausea. No vomiting. Has not eaten today.   He has been producing clear yellow urine in his ostomy bag.   No fever or chills.   He says he feels better today since getting IV ABX.     Past medical history of bladder cancer s/p urostomy (3-4 yrs ago), afib on warfarin, chronic hepatitis C, hypothyroidism, HLD, CKD, COPD, and psoriasis.    Pt with hx of smoking and alcohol but has quit both.     He has daily inhaler at home but does not use.      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    Surgical History  He has a past surgical history that includes Other surgical history (04/29/2019); Other surgical history (04/29/2019); Other surgical history (04/29/2019); Other surgical history (04/29/2019); Other surgical history (04/29/2019); Other surgical history (04/29/2019); and Other surgical history (09/24/2020).     Social History  He reports that he quit smoking about 20 years ago. His smoking use included cigarettes. He started smoking about 64 years ago. He has a 88 pack-year smoking history. He has never used smokeless tobacco. He reports current drug use. Drug: Marijuana. He reports that he does not drink alcohol.    Family History  Family History   Problem Relation Name Age of Onset    Stroke Mother      Throat cancer Father      Colon cancer Sister 53     Colon cancer Brother 51         Allergies  Ciprofloxacin, Aspirin, and Doxycycline    Review of Systems  Review of Systems   Constitutional:  Positive for activity change, appetite change, chills and fatigue. Negative for diaphoresis.   Respiratory:  Positive for shortness of breath. Negative for cough, chest tightness and wheezing.    Cardiovascular:  Negative for chest pain, palpitations and leg swelling.   Gastrointestinal:  Positive for abdominal pain, diarrhea, nausea and vomiting.   Genitourinary:  Positive for decreased urine volume.   Musculoskeletal:  Positive for back pain and myalgias.          Physical Exam  Physical Exam  Vitals reviewed.   Constitutional:       General: He is not in acute distress.     Appearance: Normal appearance. He is normal weight. He is not ill-appearing, toxic-appearing or diaphoretic.   HENT:      Head: Normocephalic and atraumatic.      Mouth/Throat:      Mouth:  "Mucous membranes are moist.   Eyes:      General: No scleral icterus.        Right eye: No discharge.         Left eye: No discharge.      Conjunctiva/sclera: Conjunctivae normal.   Cardiovascular:      Rate and Rhythm: Normal rate and regular rhythm.      Pulses: Normal pulses.      Heart sounds: Normal heart sounds. No murmur heard.     No friction rub. No gallop.   Pulmonary:      Effort: Pulmonary effort is normal. No respiratory distress.      Breath sounds: No stridor. No wheezing, rhonchi or rales.      Comments: Dim pieter throughout  Chest:      Chest wall: No tenderness.   Abdominal:      General: Bowel sounds are normal. There is no distension.      Palpations: Abdomen is soft. There is no mass.      Tenderness: There is no abdominal tenderness. There is no guarding or rebound.      Hernia: No hernia is present.      Comments: Right lower abd with urostomy accessed with catheter that is draining into the ostomy bag.   Musculoskeletal:      Right lower leg: No edema.      Left lower leg: No edema.   Skin:     General: Skin is warm and dry.   Neurological:      Mental Status: He is alert and oriented to person, place, and time.      Gait: Gait normal.   Psychiatric:         Mood and Affect: Mood normal.         Behavior: Behavior normal.         Thought Content: Thought content normal.         Judgment: Judgment normal.            Last Recorded Vitals  Blood pressure 155/64, pulse 68, temperature 37.3 °C (99.1 °F), resp. rate 19, height 1.676 m (5' 6\"), weight 66.2 kg (146 lb), SpO2 95%.    Relevant Results      Scheduled medications  amLODIPine, 10 mg, oral, Daily  aspirin, 81 mg, oral, Daily  atorvastatin, 20 mg, oral, Nightly  cefTRIAXone, 1 g, intravenous, q24h  chlorthalidone, 25 mg, oral, Daily  levothyroxine, 75 mcg, oral, Daily  lisinopril, 40 mg, oral, Daily  pyridoxine, 100 mg, oral, Daily  sodium bicarbonate, 1,300 mg, oral, BID  warfarin, 5 mg, oral, Daily      Continuous medications     PRN " medications  PRN medications: ondansetron  Results for orders placed or performed during the hospital encounter of 04/03/25 (from the past 24 hours)   Blood Culture    Specimen: Peripheral Venipuncture; Blood culture   Result Value Ref Range    Blood Culture Loaded on Instrument - Culture in progress    Blood Culture    Specimen: Peripheral Venipuncture; Blood culture   Result Value Ref Range    Blood Culture Loaded on Instrument - Culture in progress    POCT GLUCOSE   Result Value Ref Range    POCT Glucose 93 74 - 99 mg/dL   Heparin Assay, UFH   Result Value Ref Range    Heparin Unfractionated 0.2 See Comment Below for Therapeutic Ranges IU/mL   Heparin Assay   Result Value Ref Range    Heparin Unfractionated 0.1 See Comment Below for Therapeutic Ranges IU/mL   CBC   Result Value Ref Range    WBC 7.9 4.4 - 11.3 x10*3/uL    nRBC 0.0 0.0 - 0.0 /100 WBCs    RBC 4.18 (L) 4.50 - 5.90 x10*6/uL    Hemoglobin 11.4 (L) 13.5 - 17.5 g/dL    Hematocrit 34.8 (L) 41.0 - 52.0 %    MCV 83 80 - 100 fL    MCH 27.3 26.0 - 34.0 pg    MCHC 32.8 32.0 - 36.0 g/dL    RDW 17.2 (H) 11.5 - 14.5 %    Platelets 244 150 - 450 x10*3/uL   Renal function panel   Result Value Ref Range    Glucose 84 74 - 99 mg/dL    Sodium 136 136 - 145 mmol/L    Potassium 3.7 3.5 - 5.3 mmol/L    Chloride 101 98 - 107 mmol/L    Bicarbonate 24 21 - 32 mmol/L    Anion Gap 15 10 - 20 mmol/L    Urea Nitrogen 61 (H) 6 - 23 mg/dL    Creatinine 3.65 (H) 0.50 - 1.30 mg/dL    eGFR 16 (L) >60 mL/min/1.73m*2    Calcium 8.7 8.6 - 10.3 mg/dL    Phosphorus 4.1 2.5 - 4.9 mg/dL    Albumin 3.9 3.4 - 5.0 g/dL   Magnesium   Result Value Ref Range    Magnesium 2.05 1.60 - 2.40 mg/dL   Heparin Assay   Result Value Ref Range    Heparin Unfractionated 0.2 See Comment Below for Therapeutic Ranges IU/mL   Heparin Assay   Result Value Ref Range    Heparin Unfractionated 0.2 See Comment Below for Therapeutic Ranges IU/mL     *Note: Due to a large number of results and/or encounters for the  requested time period, some results have not been displayed. A complete set of results can be found in Results Review.        Assessment/Plan     This is a 80 year old man who has a history of bladder cancer s/p urostomy admitted with pieter hydronephrosis without obstruction  - imaging reviewed with Dr. Watkins.   - labs reviewed.   - no acute urologic surgical intervention at this time.   - pt may have diet.  - recommend pt complete 1 week PO ABX upon discharge.  - recommend pt follow up next week with Dr. Lawson, his regular urologist. Dr. Lawson was also updated that pt was in hospital.    - remainder of care per primary.     Plan of care discussed with Dr. Watkins and Dr. Wilkerson.     Alecia Macias, FAWAD-CNP

## 2025-04-04 NOTE — PROGRESS NOTES
Occupational Therapy                 Therapy Communication Note    Patient Name: Devin Fung Jr.  MRN: [id number]  Department: [address]  Room: 222/222-A  Today's Date: 4/4/2025     Discipline: Occupational Therapy          Missed Visit Reason:  OT eval attempted at 1147- care provider at bedside for extended time discussing POC and assessment with pt and spouse. Unable to complete OT zeus, RN notified, will re-attempt as schedule allows    Missed Time: Attempt

## 2025-04-04 NOTE — CARE PLAN
Problem: Pain - Adult  Goal: Verbalizes/displays adequate comfort level or baseline comfort level  Outcome: Progressing     Problem: Safety - Adult  Goal: Free from fall injury  Outcome: Progressing     Problem: Discharge Planning  Goal: Discharge to home or other facility with appropriate resources  Outcome: Progressing     Problem: Chronic Conditions and Co-morbidities  Goal: Patient's chronic conditions and co-morbidity symptoms are monitored and maintained or improved  Outcome: Progressing     Problem: Nutrition  Goal: Nutrient intake appropriate for maintaining nutritional needs  Outcome: Progressing   The patient's goals for the shift include  to be discharged home.     The clinical goals for the shift include to remain pain free throughout the shift     Over the shift, the patient did not make progress toward the following goals. Barriers to progression include none. Recommendations to address these barriers include none.

## 2025-04-04 NOTE — PROGRESS NOTES
Devin Fung Jr. is a 80 y.o. male on day 0 of admission presenting with Hydronephrosis.      Subjective   Devin was seen and examined at bedside. Devin states his lower back pain and abdominal pain has gone away. Per Devin's urologist, a smith was placed.   Denies any new or worsening symptoms. Denies nausea, vomiting, abdominal pain, flank pain, fevers, chills, or chest pain.       Objective     Last Recorded Vitals  /67 (BP Location: Right arm)   Pulse 53   Temp 37.7 °C (99.9 °F) (Temporal)   Resp 17   Wt 66.2 kg (146 lb)   SpO2 92%   Intake/Output last 3 Shifts:    Intake/Output Summary (Last 24 hours) at 4/4/2025 0724  Last data filed at 4/4/2025 0651  Gross per 24 hour   Intake 0 ml   Output 985 ml   Net -985 ml       Admission Weight  Weight: 65.8 kg (145 lb) (04/03/25 1234)    Daily Weight  04/03/25 : 66.2 kg (146 lb)    Image Results  US right upper quadrant  Narrative: Interpreted By:  Finkelstein, Evan,   STUDY:  US RIGHT UPPER QUADRANT;  4/3/2025 7:45 pm      INDICATION:  Signs/Symptoms:f/u dilated gallbladder on CTAP.      COMPARISON:  None.      ACCESSION NUMBER(S):  [id number]      ORDERING CLINICIAN:  MICHELLE FORBES      TECHNIQUE:  Grayscale and color Doppler sonographic imaging of the right upper  quadrant.      FINDINGS:  LIVER: 15.6 cm. Normal echogenicity, and echotexture. No focal  abnormalities.      BILE DUCTS: No intrahepatic or extrahepatic bile duct dilatation.  Common Bile Duct =   3 mm.      GALLBLADDER: Echogenic material with posterior acoustic shadowing  compatible with gallstones measuring up to 2.4 cm at the gallbladder  neck. No wall thickness or pericholecystic fluid. Negative  sonographic Weathers's sign as reported by the technologist.      PANCREAS: Not well visualized due to overlying bowel gas      RIGHT KIDNEY: 9 cm. Normal echogenicity. No hydronephrosis.  Hypoechoic lesions in the right kidney measuring up to 2.4 cm  demonstrates posterior acoustic  enhancement and no internal  vascularity compatible with simple cysts.      PERITONEUM: No upper abdominal ascites.      Impression: Cholelithiasis. No sonographic evidence of cholecystitis.      MACRO:  None.      Signed by: Evan Finkelstein 4/3/2025 8:03 PM  Dictation workstation:   [id number]  ECG 12 lead  Atrial fibrillation with a competing junctional pacemaker  Abnormal ECG  When compared with ECG of 06-MAR-2024 11:57,  Atrial fibrillation has replaced Sinus rhythm  T wave amplitude has decreased in Lateral leads  See ED provider note for full interpretation and clinical correlation  Confirmed by Annie Farias ([id number]) on 4/3/2025 2:48:59 PM  CT abdomen pelvis wo IV contrast  Narrative: Interpreted By:  Edvin Villarreal,   STUDY:  CT ABDOMEN PELVIS WO IV CONTRAST;  4/3/2025 1:44 pm      INDICATION:  Signs/Symptoms:hx of CKD, bilateral flank pain, nausea, vomitng, abd  and CVA TTP.          COMPARISON:  CT chest, abdomen and pelvis without contrast 7 November 2024      ACCESSION NUMBER(S):  [id number]      ORDERING CLINICIAN:  GEREMIAS CONTRERAS      TECHNIQUE:  CT of the abdomen and pelvis from the lung bases through the  symphysis pubis without oral or IV contrast      FINDINGS:  LOWER CHEST: Chronic unchanged subsegmental atelectasis in the medial  segment middle lobe. No acute new process in the included lung bases      BONES: Degenerative changes. No acute new findings      RIGHT KIDNEY AND URETER:  Radiodense stone: Negative  Hydronephrosis: Borderline new all the way to the conduit anastomosis  Congenital variant anatomy: Negative. No duplicated ureter or other  variant anatomy. Other: New mild inflammatory wall thickening of the  walls of the collecting system and ureter down to the conduit  anastomosis. Adjacent upper pole simple cysts x2 are unchanged      LEFT KIDNEY AND URETER:  Radiodense stone: Negative  Hydronephrosis: Borderline new all the way to the conduit anastomosis  Congenital variant anatomy:  Negative. No duplicated ureter or other  variant anatomy. Other: New mild inflammatory wall thickening of the  walls of the collecting system and ureter down to the conduit  anastomosis. Few small simple cysts medially between the poles and  towards the lower pole unchanged      URINARY BLADDER: Absent      LIVER: Normal. No enlargement or evidence of cirrhosis or fatty  change. No gross evidence of a mass, noting low sensitivity and  specificity without IV contrast.      SPLEEN: Normal. No enlargement.      PANCREAS: Above water attenuation 14 mm cyst off the uncinate process  is unchanged. No main duct dilation, acute inflammation or obvious  solid mass on this exam without contrast      GALLBLADDER: Dilated. CT cannot exclude stones but there may at least  be tumefactive sludge balls. CT appearance remarkably similar to 7 November 2024      BILE DUCTS: Normal. No biliary duct dilation.      ADRENAL GLANDS: Normal. No nodule or mass.      LYMPH NODES: Redemonstration of increased number of borderline and  mildly enlarged retroperitoneal nodes predominately about the aorta,  mostly unchanged. 12 mm short axis left para-aortic lymph node  today's exam axial series 201, image 50 was only 7 mm short axis  diameter on most recent comparison exam 7 November 2024      BOWEL: Postsurgical change. No dilation or inflammation. Prominent  appendiceal stump status post appendectomy or diminutive stump like  appendix unchanged back through at least the CT from 19 February 2021      STOMACH / DUODENUM: Grossly normal by CT which has limited  sensitivity and specificity for the stomach and duodenum.      RETROPERITONEUM: Normal.  No acute hemorrhage or inflammatory change.  Lymph nodes in a separate dedicated section.      OMENTUM, MESENTERY AND PERITONEAL SPACES:  Free intraperitoneal air: Negative  Free intraperitoneal fluid: Negative  Abscess: Negative  Other: n/a      PELVIS: No free fluid or adenopathy      VASCULATURE:  Aortic and iliac atherosclerotic calcifications without  aneurysm or other acute finding.      ABDOMINAL WALL:  Hernia: Negative  Other: No acute or contributory abnormality.      Impression: New borderline bilateral hydroureteronephrosis      Definite new inflammatory wall thickening of bilateral renal  collecting systems and ureters all the way to the ureteric-conduit  anastomosis      No stone anywhere in the urinary tract      No acute unexpected CT findings associated with the conduit which is  distended with urine proximally, collapsed more distally approaching  the deep aspect of the abdominal wall prior to the segment crossing  the abdominal wall to the stoma      The only potentially acute finding outside the urinary tract is the  dilated gallbladder with CT appearance suggesting at least  tumefactive sludge if not stones, but the entire CT appearance  including the dilation is unchanged from multiple prior exams      Minority of retroperitoneal lymph nodes have increased in size when  compared to most recent CT 7 November 2024      MACRO:  None      Signed by: Edvin Villarreal 4/3/2025 2:05 PM  Dictation workstation:   [id number]      Physical Exam  HENT:      Head: Normocephalic.   Cardiovascular:      Rate and Rhythm: Normal rate.      Heart sounds:      No friction rub. No gallop.   Pulmonary:      Effort: Pulmonary effort is normal. No respiratory distress.      Breath sounds: Normal breath sounds.   Abdominal:      General: There is no distension.      Tenderness: There is no abdominal tenderness. There is no right CVA tenderness, left CVA tenderness or guarding.      Comments: No RUQ pain to palpation, bowel sounds present in 4 quadrants   Musculoskeletal:      Right lower leg: No edema.      Left lower leg: No edema.   Neurological:      Mental Status: He is alert. Mental status is at baseline.       Relevant Results  Results for orders placed or performed during the hospital encounter of 04/03/25  (from the past 24 hours)   Sars-CoV-2, Influenza A/B and RSV PCR   Result Value Ref Range    Coronavirus 2019, PCR Not Detected Not Detected    Flu A Result Not Detected Not Detected    Flu B Result Not Detected Not Detected    RSV PCR Not Detected Not Detected   ECG 12 lead   Result Value Ref Range    Ventricular Rate 72 BPM    QRS Duration 88 ms    QT Interval 394 ms    QTC Calculation(Bazett) 431 ms    R Axis 8 degrees    T Axis 53 degrees    QRS Count 12 beats    Q Onset 213 ms    T Offset 410 ms    QTC Fredericia 418 ms   CBC and Auto Differential   Result Value Ref Range    WBC 8.7 4.4 - 11.3 x10*3/uL    nRBC 0.0 0.0 - 0.0 /100 WBCs    RBC 4.87 4.50 - 5.90 x10*6/uL    Hemoglobin 13.2 (L) 13.5 - 17.5 g/dL    Hematocrit 40.5 (L) 41.0 - 52.0 %    MCV 83 80 - 100 fL    MCH 27.1 26.0 - 34.0 pg    MCHC 32.6 32.0 - 36.0 g/dL    RDW 17.2 (H) 11.5 - 14.5 %    Platelets 256 150 - 450 x10*3/uL    Neutrophils % 80.0 40.0 - 80.0 %    Immature Granulocytes %, Automated 0.3 0.0 - 0.9 %    Lymphocytes % 13.9 13.0 - 44.0 %    Monocytes % 4.6 2.0 - 10.0 %    Eosinophils % 1.0 0.0 - 6.0 %    Basophils % 0.2 0.0 - 2.0 %    Neutrophils Absolute 6.91 (H) 1.60 - 5.50 x10*3/uL    Immature Granulocytes Absolute, Automated 0.03 0.00 - 0.50 x10*3/uL    Lymphocytes Absolute 1.20 0.80 - 3.00 x10*3/uL    Monocytes Absolute 0.40 0.05 - 0.80 x10*3/uL    Eosinophils Absolute 0.09 0.00 - 0.40 x10*3/uL    Basophils Absolute 0.02 0.00 - 0.10 x10*3/uL   Comprehensive metabolic panel   Result Value Ref Range    Glucose 88 74 - 99 mg/dL    Sodium 135 (L) 136 - 145 mmol/L    Potassium 4.6 3.5 - 5.3 mmol/L    Chloride 100 98 - 107 mmol/L    Bicarbonate 23 21 - 32 mmol/L    Anion Gap 17 10 - 20 mmol/L    Urea Nitrogen 64 (H) 6 - 23 mg/dL    Creatinine 4.15 (H) 0.50 - 1.30 mg/dL    eGFR 14 (L) >60 mL/min/1.73m*2    Calcium 8.8 8.6 - 10.3 mg/dL    Albumin 4.5 3.4 - 5.0 g/dL    Alkaline Phosphatase 51 33 - 136 U/L    Total Protein 7.9 6.4 - 8.2 g/dL     AST 16 9 - 39 U/L    Bilirubin, Total 0.7 0.0 - 1.2 mg/dL    ALT 8 (L) 10 - 52 U/L   Magnesium   Result Value Ref Range    Magnesium 2.29 1.60 - 2.40 mg/dL   Lipase   Result Value Ref Range    Lipase 15 9 - 82 U/L   Troponin I, High Sensitivity   Result Value Ref Range    Troponin I, High Sensitivity 9 0 - 20 ng/L   Urinalysis with Reflex Culture and Microscopic   Result Value Ref Range    Color, Urine Light-Orange (N) Light-Yellow, Yellow, Dark-Yellow    Appearance, Urine Turbid (N) Clear    Specific Gravity, Urine 1.012 1.005 - 1.035    pH, Urine 6.5 5.0, 5.5, 6.0, 6.5, 7.0, 7.5, 8.0    Protein, Urine 100 (2+) (A) NEGATIVE, 10 (TRACE), 20 (TRACE) mg/dL    Glucose, Urine Normal Normal mg/dL    Blood, Urine 1.0 (3+) (A) NEGATIVE mg/dL    Ketones, Urine NEGATIVE NEGATIVE mg/dL    Bilirubin, Urine NEGATIVE NEGATIVE mg/dL    Urobilinogen, Urine Normal Normal mg/dL    Nitrite, Urine NEGATIVE NEGATIVE    Leukocyte Esterase, Urine 500 Ivory/uL (A) NEGATIVE   Extra Urine Gray Tube   Result Value Ref Range    Extra Tube Hold for add-ons.    Microscopic Only, Urine   Result Value Ref Range    WBC, Urine >50 (A) 1-5, NONE /HPF    RBC, Urine >20 (A) NONE, 1-2, 3-5 /HPF    Mucus, Urine FEW Reference range not established. /LPF    Amorphous Crystals, Urine 1+ NONE, 1+, 2+ /HPF   Blood Culture    Specimen: Peripheral Venipuncture; Blood culture   Result Value Ref Range    Blood Culture Loaded on Instrument - Culture in progress    Blood Culture    Specimen: Peripheral Venipuncture; Blood culture   Result Value Ref Range    Blood Culture Loaded on Instrument - Culture in progress    POCT GLUCOSE   Result Value Ref Range    POCT Glucose 93 74 - 99 mg/dL   Heparin Assay, UFH   Result Value Ref Range    Heparin Unfractionated 0.2 See Comment Below for Therapeutic Ranges IU/mL   Heparin Assay   Result Value Ref Range    Heparin Unfractionated 0.1 See Comment Below for Therapeutic Ranges IU/mL   CBC   Result Value Ref Range    WBC 7.9  4.4 - 11.3 x10*3/uL    nRBC 0.0 0.0 - 0.0 /100 WBCs    RBC 4.18 (L) 4.50 - 5.90 x10*6/uL    Hemoglobin 11.4 (L) 13.5 - 17.5 g/dL    Hematocrit 34.8 (L) 41.0 - 52.0 %    MCV 83 80 - 100 fL    MCH 27.3 26.0 - 34.0 pg    MCHC 32.8 32.0 - 36.0 g/dL    RDW 17.2 (H) 11.5 - 14.5 %    Platelets 244 150 - 450 x10*3/uL   Renal function panel   Result Value Ref Range    Glucose 84 74 - 99 mg/dL    Sodium 136 136 - 145 mmol/L    Potassium 3.7 3.5 - 5.3 mmol/L    Chloride 101 98 - 107 mmol/L    Bicarbonate 24 21 - 32 mmol/L    Anion Gap 15 10 - 20 mmol/L    Urea Nitrogen 61 (H) 6 - 23 mg/dL    Creatinine 3.65 (H) 0.50 - 1.30 mg/dL    eGFR 16 (L) >60 mL/min/1.73m*2    Calcium 8.7 8.6 - 10.3 mg/dL    Phosphorus 4.1 2.5 - 4.9 mg/dL    Albumin 3.9 3.4 - 5.0 g/dL   Magnesium   Result Value Ref Range    Magnesium 2.05 1.60 - 2.40 mg/dL   Heparin Assay   Result Value Ref Range    Heparin Unfractionated 0.2 See Comment Below for Therapeutic Ranges IU/mL     *Note: Due to a large number of results and/or encounters for the requested time period, some results have not been displayed. A complete set of results can be found in Results Review.         Assessment & Plan  Hydronephrosis    Devin Fung Jr. is a 80 y.o. year old male with past medical history of bladder cancer s/p urostomy (3-4 yrs ago), afib on warfarin, chronic hepatitis C, hypothyroidism, HLD, CKD, COPD, and psoriasis who presents with a one month history of generalized weakness, fatigue, nausea, and vomiting.      ACUTE MEDICAL ISSUES:  # Bilateral flank pain/RUQ pain (resolved)  # Bilateral hydroureteronephrosis without obstruction   # Concern for UTI   # Hx of CKD stage 4  # Hx of bladder cancer sp resection and ileal conduit   ::RUQ ultrasound showed cholelithiasis with no evidence of cholecystitis   -Urine cultures pending, covering empirically with rocephin   -Blood Cultures NGTD  -urology consulted: Appreciate recs  -I/Os     #n/v/d; (resolving)  :: Denies recent  episodes of diarrhea, nausea or vomiting  Plan:  -stool pathogen PCR, c.diff pending  -IVF, antiemetics      CHRONIC MEDICAL ISSUES:  #Hypertension- continue amlodipine, chlorthalidone, losartan  #Afib-continue warfarin  #HLD- continue atorvastatin  #Hypothyroidism- continue levothyroxine     Fluids: prn  Electrolytes: replete prn   Nutrition: adult regular  Antimicrobials: ceftriaxone  DVT ppx: SCDs & therapeutic warfarin  Lines: PIV  Supplemental Oxygen: None  Emergency Contact: Extended Emergency Contact Information  Primary Emergency Contact: Jennifer Fung  Home Phone: [phone]  Relation: Spouse   needed? No   Code: DNR/DNI    JUSTICE DELIA  04/04/25 at 11:41 AM          ATTESTATION TO MEDICAL STUDENT NOTE     PGY-1 Attestation: I saw and evaluated the patient.  I personally obtained the key and critical portions of the history and physical exam or was physically present for key and critical portions performed by the student. I reviewed the student's documentation and discussed the patient with the student.  I agree with the documentation as detailed in the note unless stated otherwise in this attestation.     Aby Olmedo, DO  Internal Medicine, PGY-1  04/04/25 at 1:27 PM

## 2025-04-04 NOTE — SIGNIFICANT EVENT
Patient's primary urologist, Dr. Eugene Lawson called and recommended a Patton be inserted into his ureteroileal conduit through his stoma for his hydroureteronephrosis and his MIMI.  Patton was placed and nephrostomy bag was replaced with urine return.

## 2025-04-04 NOTE — DISCHARGE INSTRUCTIONS
Please, take your home medications as instructed after being discharged from the hospital.     NEW MEDICATIONS:  cephalexin 500 mg capsule; Commonly known as: Keflex; Take 1 capsule   (500 mg) by mouth once daily for 5 days. Do not fill before April 5, 2025.; Start taking on: April 5, 2025    Discharge instructions:  Please leave indwelling catheter in place & follow-up with your urologist (Dr. Eugene Lawson) following discharge  Please finish course of cephalexin  Please follow-up with PCP for all other chronic issues. Can get referral for general surgery if gallbladder causing symptoms in future     Please, follow-up with your urologist & PCP within 7 to 14 days after leaving the hospital. /appointment services has been requested to make an appointment for you, however if you do not hear back from them within 1 to 2 days, please call your primary physician's office to schedule an appointment. Bring your photo ID and insurance card to your appointment.   [facility]  services can be reached at [phone].     If you experience any worsening symptoms or have any concerns, please contact your primary care provider to schedule an appointment. If you cannot get in touch with your primary care physician, please return to the nearest emergency room or urgent care clinic for an evaluation and treatment.     Thank you for choosing [facility] and allowing us to partake in your medical care!     - Your [facility] inpatient primary care team.

## 2025-04-04 NOTE — NURSING NOTE
Discharge instructions reviewed with patient. No questions at this time. Education given for new homegoing medications with type, uses, and most common side effects. Patient verbalized understanding. Telemetry removed and left at nurses station, masimo removed and left at bedside. IV removed. Discharged home in stable condition.

## 2025-04-04 NOTE — CONSULTS
"Nutrition Initial Assessment:   Nutrition Assessment    Reason for Assessment: Admission nursing screening (MST=2; Unintentional weight loss, Poor PO intake)    Patient is a 80 y.o. male presenting with x1 month of weakness, fatigue, N/V, & taste changes.    Imaging revealed bilateral hydroureteronephrosis w/o obstruction.    Pt currently undergoing workup to rule out cholecystitis vs infectious etiology.    PMH: Bladder cancer s/p urostomy (3-4 yrs ago), Afib on warfarin, T2DM, Chronic hepatitis C, Hypothyroidism, HLD, CKD, COPD, and Psoriasis     Nutrition History:  Food and Nutrient History: Visit made, pt resting in bed w/ wife at bedside. Reports that at baseline he used to consume x2 meals/day without issue. Notes the past x1 month food hasn't been tasting like it should. Wife reports pt living off either pudding or jello with only a couple bites of some sort of protein. Wife reports she makes him homemade protein shakes w/ isolate protein powder but pt only takes a few sips & then is done with them. Wife reports she thinks pt has tried Ensure in the past w/ poor acceptance. Willing to trial Mighty Shakes & Magic Cup. No additional needs at this time.  Vitamin/Herbal Supplement Use: None currently - Ran out of vitamin B6 & never refilled  Food Allergy:  (None)     Anthropometrics:  Height: 167.6 cm (5' 6\")   Weight: 66.2 kg (146 lb)   BMI (Calculated): 23.58  IBW/kg (Dietitian Calculated): 64.5 kg  Percent of IBW: 103 %    Weight History:   Wt Readings from Last 60 Encounters:   04/03/25 66.2 kg (146 lb) --> Admit wt   03/06/25 71.1 kg (156 lb 12.8 oz)   11/12/24 69.9 kg (154 lb)   09/05/24 68.4 kg (150 lb 12.8 oz)   05/07/24 65.8 kg (145 lb)   03/05/24 71.8 kg (158 lb 6.4 oz)     Weight Change %:  Weight History / % Weight Change: 7% x 1 month  Significant Weight Loss: Yes  Interpretation of Weight Loss: >5% in 1 month    Nutrition Focused Physical Exam Findings:  Subcutaneous Fat Loss:   Orbital Fat Pads: " Mild-Moderate (slight dark circles and slight hollowing)  Triceps: Mild-Moderate (less than ample fat tissue)  Muscle Wasting:  Temporalis: Mild-Moderate (slight depression)  Pectoralis (Clavicular Region): Mild-Moderate (some protrusion of clavicle)  Interosseous: Mild-Moderate (slightly depressed area between thumb and forefinger)  Edema:  Edema: none  Physical Findings:  Hair: Negative  Eyes: Negative  Nails: Negative  Skin: Positive (Intact)  Digestive System Findings: Diarrhea, Nausea, Vomiting  Mouth Findings: Dysgeusia    Nutrition Significant Labs:  BMP Trend:   Results from last 7 days   Lab Units 04/04/25  0630 04/03/25  1314   GLUCOSE mg/dL 84 88   CALCIUM mg/dL 8.7 8.8   SODIUM mmol/L 136 135*   POTASSIUM mmol/L 3.7 4.6   CO2 mmol/L 24 23   CHLORIDE mmol/L 101 100   BUN mg/dL 61* 64*   CREATININE mg/dL 3.65* 4.15*      Nutrition Specific Medications:  Scheduled medications  cefTRIAXone, 1 g, intravenous, q24h  levothyroxine, 75 mcg, oral, Daily  pyridoxine, 100 mg, oral, Daily  sodium bicarbonate, 1,300 mg, oral, BID  warfarin, 5 mg, oral, Daily    I/O:   No recorded BM since admission.    Dietary Orders (From admission, onward)       Start     Ordered    04/04/25 1235  Adult diet Regular  Diet effective now        Question:  Diet type  Answer:  Regular    04/04/25 1234    04/03/25 1658  May Participate in Room Service  ( ROOM SERVICE MAY PARTICIPATE)  Once        Question:  .  Answer:  Yes    04/03/25 1657             Estimated Needs:   Total Energy Estimated Needs in 24 hours (kCal):  (2489-9135)  Method for Estimating Needs: 27-30 kcals/kg x CBW (66.2 kg)  Total Protein Estimated Needs in 24 Hours (g):  (80+)  Method for Estimating 24 Hour Protein Needs: ~1.2 g/kg x CBW (66.2 kg)  Total Fluid Estimated Needs in 24 Hours (mL):  (5738-6002)  Method for Estimating 24 Hour Fluid Needs: 1mL/kcal or per team        Nutrition Diagnosis   Malnutrition Diagnosis  Patient has Malnutrition Diagnosis:  Yes  Diagnosis Status: New  Malnutrition Diagnosis: Moderate malnutrition related to acute disease or injury  Related to: N/V & taste changes of unknown etiology  As Evidenced by: reported <75% EENs for >7 days, significant 7% weight loss x 1 month, & mild-moderate muscle wasting/fat losses      Nutrition Interventions/Recommendations      Nutrition Recommendations:  Continue liberalized regular diet as tolerated    Check vitamin D level & replete PRN      Nutrition Interventions/Goals:   Medical Food Supplement: Commercial beverage medical food supplement therapy  Goal: Mighty Shake BID (220 kcals/6g protein each)  +  Magic Cup daily (290 kcals/9g protein each)      Nutrition Monitoring and Evaluation   Food/Nutrient Related History Monitoring  Monitoring and Evaluation Plan: Estimated Energy Intake  Estimated Energy Intake: Energy intake 50 -75% of estimated energy needs    Time Spent (min): 60 minutes

## 2025-04-04 NOTE — PROGRESS NOTES
04/04/25 1321   Discharge Planning   Living Arrangements Spouse/significant other   Support Systems Spouse/significant other   Assistance Needed Alert and oriented x 4,  Independent with ADL's, Drives, Still working, No DME, Patient has Urostomy, Per patient he has a standing order for out patient physical therapy,  Room air baseline and currently room air; PCP Tasha Alvarez CNP   Type of Residence Private residence   Number of Stairs to Enter Residence 3  (Has ramp as well into the home)   Number of Stairs Within Residence 20  (10 steps upstairs, and 10 steps to basement)   Do you have animals or pets at home? No   Who is requesting discharge planning? Provider   Home or Post Acute Services None   Expected Discharge Disposition Home  (Patient denying any discharge needs at this time, PT/OT evaluations are pending at this time)   Does the patient need discharge transport arranged? No   Financial Resource Strain   How hard is it for you to pay for the very basics like food, housing, medical care, and heating? Not hard   Housing Stability   In the last 12 months, was there a time when you were not able to pay the mortgage or rent on time? N   In the past 12 months, how many times have you moved where you were living? 0   At any time in the past 12 months, were you homeless or living in a shelter (including now)? N   Transportation Needs   In the past 12 months, has lack of transportation kept you from medical appointments or from getting medications? no   In the past 12 months, has lack of transportation kept you from meetings, work, or from getting things needed for daily living? No   Patient Choice   Provider Choice list and CMS website (https://medicare.gov/care-compare#search) for post-acute Quality and Resource Measure Data were provided and reviewed with: Patient;Family   Patient / Family choosing to utilize agency / facility established prior to hospitalization No   Stroke Family Assessment   Stroke Family  Assessment Needed No   Intensity of Service   Intensity of Service 0-30 min

## 2025-04-06 LAB
BACTERIA BLD CULT: NORMAL
BACTERIA BLD CULT: NORMAL
BACTERIA UR CULT: ABNORMAL

## 2025-04-07 ENCOUNTER — PATIENT OUTREACH (OUTPATIENT)
Dept: PRIMARY CARE | Facility: CLINIC | Age: 80
End: 2025-04-07
Payer: MEDICARE

## 2025-04-07 NOTE — PROGRESS NOTES
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 Eugene Lawson) following discharge  Please finish course of cephalexin  Please follow-up with PCP for all other chronic issues. Can get referral for general surgery if gallbladder causing symptoms in future      Admission Date: 4/3/2025   Discharge Date:  4/4/2025     PCP Appointment Date: Please follow-up with PCP for all other chronic issues. Can get referral for general surgery if gallbladder causing symptoms in future Please, follow-up with your urologist & PCP within 7 to 14 days after leaving the hospital.    Specialist Appointment Date:     urologist (Dr. Eugene Lawson) / In process    Hospital Encounter and Summary Linked: Yes    ED to Hosp-Admission (Discharged) with Ian Rivera DO (04/03/2025)     See discharge assessment below for further details     Wrap Up  Wrap Up Additional Comments: Patient has support of spouse in home, aware of med chnages , waiting for Urology office to call and sowmya. F/U. I sent message to PCP office regarding F/U , patient to discuss. Patient encouraged to call providers for any questions concerns or change in condition. (4/7/2025 12:30 PM)    Engagement  Call Start Time: 1231 (4/7/2025 12:30 PM)    Medications  Medications reviewed with patient/caregiver?: Yes (4/7/2025 12:30 PM)  Is the patient having any side effects they believe may be caused by any medication additions or changes?: No (4/7/2025 12:30 PM)  Does the patient have all medications ordered at discharge?: Yes (4/7/2025 12:30 PM)  Care Management Interventions: No intervention needed (4/7/2025 12:30 PM)  Prescription Comments: START taking these  medications     cephalexin 500 mg capsule; Commonly known as: Keflex; Take 1 capsule   (500 mg) by mouth once daily for 5 days. Do not fill before April 5, 2025.; Start taking on: April 5, 2025 (4/7/2025 12:30 PM)  Is the patient taking all medications as directed (includes completed medication regime)?: -- (Pt states has all meds) (4/7/2025 12:30 PM)  Care Management Interventions: Provided patient education (4/7/2025 12:30 PM)  Medication Comments: STOP taking these medications     amoxicillin-pot clavulanate 875-125 mg tablet; Commonly known as:   Augmentin (4/7/2025 12:30 PM)    Appointments  Does the patient have a primary care provider?: Yes (4/7/2025 12:30 PM)  Care Management Interventions: Educated patient on importance of making appointment (4/7/2025 12:30 PM)  Care Management Interventions: Advised to schedule with specialist (4/7/2025 12:30 PM)    Self Management  What is the home health agency?: NA (4/7/2025 12:30 PM)  What Durable Medical Equipment (DME) was ordered?: NA (4/7/2025 12:30 PM)    Patient Teaching  Does the patient have access to their discharge instructions?: Yes (4/7/2025 12:30 PM)  Care Management Interventions: Reviewed instructions with patient (4/7/2025 12:30 PM)  What is the patient's perception of their health status since discharge?: Improving (4/7/2025 12:30 PM)  Is the patient/caregiver able to teach back the hierarchy of who to call/visit for symptoms/problems? PCP, Specialist, Home Health nurse, Urgent Care, ED, 911: Yes (4/7/2025 12:30 PM)

## 2025-04-08 LAB
BACTERIA BLD CULT: NORMAL
BACTERIA BLD CULT: NORMAL

## 2025-04-10 ENCOUNTER — OFFICE VISIT (OUTPATIENT)
Dept: UROLOGY | Facility: CLINIC | Age: 80
End: 2025-04-10
Payer: MEDICARE

## 2025-04-10 ENCOUNTER — APPOINTMENT (OUTPATIENT)
Dept: PRIMARY CARE | Facility: CLINIC | Age: 80
End: 2025-04-10
Payer: MEDICARE

## 2025-04-10 DIAGNOSIS — C61 PROSTATE CANCER (MULTI): Primary | ICD-10-CM

## 2025-04-10 DIAGNOSIS — C67.9 MALIGNANT NEOPLASM OF URINARY BLADDER, UNSPECIFIED SITE (MULTI): ICD-10-CM

## 2025-04-10 PROCEDURE — 1159F MED LIST DOCD IN RCRD: CPT | Performed by: STUDENT IN AN ORGANIZED HEALTH CARE EDUCATION/TRAINING PROGRAM

## 2025-04-10 PROCEDURE — 1157F ADVNC CARE PLAN IN RCRD: CPT | Performed by: STUDENT IN AN ORGANIZED HEALTH CARE EDUCATION/TRAINING PROGRAM

## 2025-04-10 PROCEDURE — G2211 COMPLEX E/M VISIT ADD ON: HCPCS | Performed by: STUDENT IN AN ORGANIZED HEALTH CARE EDUCATION/TRAINING PROGRAM

## 2025-04-10 PROCEDURE — 99214 OFFICE O/P EST MOD 30 MIN: CPT | Performed by: STUDENT IN AN ORGANIZED HEALTH CARE EDUCATION/TRAINING PROGRAM

## 2025-04-10 NOTE — PROGRESS NOTES
"HPI:  Procedure (8/2021): RC+IC   Path: JAQUELINE, GG1 prostate cancer     Procedure (4/2021): TURBT   Path: HgT1     Devin Fung Jr. \"Brandon\" is a 80 y.o. male with bladder cancer, s/p RC+IC. CT CAP (3/14/22) showed stable emphysematous changes of the lungs with a nonspecific 5 mm ground-glass nodule within the right lower lobe which may represent focal atelectasis, postsurgical changes from cystoprostatectomy with ileal conduit without interval abnormality, previously noted probable extraperitoneal hematoma is markedly decreased in size. CT CAP (9/14/22) showed stable subcentimeter pulmonary nodule, largest within the right lower lobe measuring up to 5 mm; no noncontrast evidence of new subdiaphragmatic metastatic disease; hyperdense lesion of the left kidney midpole measuring up to 2.2 cm, indeterminate, most likely representing a Bosniak 2 lesion; similar appearing bilateral renal cysts. CT CAP (3/29/23) showed new punctate clustered nodules in the right upper lobe as well as a few new nodules in the left lower lobe, overall the appearance favors an infectious or inflammatory process over metastatic disease, otherwise no convincing findings of new metastatic disease of the thorax, no definite new metastatic disease on noncontrast exam, new symmetric mild dilatation of the ureters without additional findings to indicate ureteral obstruction. Recently underwent hernia repair on (12/27/22). CT CAP (10/23/23) showed no definite evidence of locoregional recurrence or metastatic disease in the chest, abdomen, or pelvis, new nonspecific 4 mm nodular opacity in the left lower lobe. CT CAP (11/7/24) showed no significant interval change s/p cystoprostatectomy with a right lower quadrant ileal conduit creation, no definite sites of new metastatic disease. CT CAP (4/3/25) showed definite new inflammatory wall thickening of bilateral renal collecting systems and ureters all the way to the ureteric-conduit anastomosis, no acute " unexpected CT findings associated with the conduit, minority of retroperitoneal lymph nodes have increased in size when compared to most recent CT 11/7/24. Patient was seen in ED (4/3/25) for hydronephrosis. Reports continued nausea and vomiting at home. Reports mild back pain. Reports catheter is clear and is able to pass urine. Reports increased fatigue and sleepiness.      PSA undetectable (12/17/21)  Cre: 3.65 (4/4/25), 3.79 (9/4/24), 2.83 (3/29/23), 2.78 (8/29/22), 2.33 (3/14/22), 1.70 (12/17/21), 1.63 (11/2021)     CT CAP (10/7/21): No acute abnormality. Postsurgical changes from cysto prostatectomy, without evidence of hydronephrosis. The previously seen extraperitoneal fluid collection centered in the prostate fossa has significantly decreased in size and demonstrates slightly greater density than simple fluid, with its major component however still measuring 6.3 x 7.3 x 4.2 cm. There is no associated gas on the current examination. There are no other collections identified. This may reflect an extraperitoneal hematoma, superimposed infection is difficult to exclude.     CT CAP wo contrast (3/14/22): Stable emphysematous changes of the lungs with a nonspecific 5 mm ground-glass nodule within the right lower lobe which may represent focal atelectasis. Please give attention on follow-up examination. Postsurgical changes from cystoprostatectomy with ileal conduit without interval abnormality. Previously noted probable extraperitoneal hematoma is markedly decreased in size. New parastomal fat containing hernia.      CT CAP wo contrast (9/14/22): Stable subcentimeter pulmonary nodule since comparison CT imaging 03/14/2022, largest within the right lower lobe measuring up to 5 mm. Remaining intrathoracic findings appear stable since comparison CT imaging 03/14/2022. Postsurgical changes from cystoprostatectomy as previously seen. No noncontrast evidence of new subdiaphragmatic metastatic disease. Hyperdense lesion  of the left kidney midpole measuring up to 2.2 cm, indeterminate, however most likely representing a Bosniak 2 lesion. Additional similar appearing bilateral renal cysts. Remainder findings appear stable since comparison CT imaging 03/14/2022.     CT CAP (3/29/23): new punctate clustered nodules in the right upper lobe as well as a few new nodules in the left lower lobe, overall the appearance favors an infectious or inflammatory process over metastatic disease, otherwise no convincing findings of new metastatic disease of the thorax, no definite new metastatic disease on noncontrast exam, new symmetric mild dilatation of the ureters without additional findings to indicate ureteral obstruction     CT CAP (10/23/23): no definite evidence of locoregional recurrence or metastatic disease in the chest, abdomen, or pelvis, new nonspecific 4 mm nodular opacity in the left lower lobe, the additional smaller scattered waxing and waning punctate nodules in both lungs which are favored of infectious/inflammatory etiology.    CT CAP (11/7/24): no significant interval change s/p cystoprostatectomy with a right lower quadrant ileal conduit creation, no definite sites of new metastatic disease.    CT CAP (4/3/25): definite new inflammatory wall thickening of bilateral renal collecting systems and ureters all the way to the ureteric-conduit anastomosis, no acute unexpected CT findings associated with the conduit, minority of retroperitoneal lymph nodes have increased in size when compared to most recent CT 11/7/24.    Review of Systems:  All systems reviewed. Anything negative noted in the HPI.    Physical Exam:  Vitals signs reviewed.  Constitutional:      Appearance: Well-developed.  HENT:     Head: Normocephalic and atraumatic.  Neck:     Musculoskeletal: Normal range of motion.  Pulmonary:     Effort: Pulmonary effort is normal.  Musculoskeletal: Normal range of motion.  Skin:     General: Skin is warm and  "dry.  Neurological:     Mental Status: Alert and oriented to person, place, and time.  Psychiatric:        Behavior: Behavior normal.        Thought Content: Thought content normal.        Judgment: Judgment normal.     Assessment/Plan   Devin Fung Jr. \"Brandon\" is a 80 y.o. male with bladder cancer, s/p RC+IC. CT CAP (3/14/22) showed stable emphysematous changes of the lungs with a nonspecific 5 mm ground-glass nodule within the right lower lobe which may represent focal atelectasis, postsurgical changes from cystoprostatectomy with ileal conduit without interval abnormality, previously noted probable extraperitoneal hematoma is markedly decreased in size. CT CAP (9/14/22) showed stable subcentimeter pulmonary nodule, largest within the right lower lobe measuring up to 5 mm; no noncontrast evidence of new subdiaphragmatic metastatic disease; hyperdense lesion of the left kidney midpole measuring up to 2.2 cm, indeterminate, most likely representing a Bosniak 2 lesion; similar appearing bilateral renal cysts. CT CAP (3/29/23) showed new punctate clustered nodules in the right upper lobe as well as a few new nodules in the left lower lobe, overall the appearance favors an infectious or inflammatory process over metastatic disease, otherwise no convincing findings of new metastatic disease of the thorax, no definite new metastatic disease on noncontrast exam, new symmetric mild dilatation of the ureters without additional findings to indicate ureteral obstruction. Recently underwent hernia repair on (12/27/22). CT CAP (10/23/23) showed no definite evidence of locoregional recurrence or metastatic disease in the chest, abdomen, or pelvis, new nonspecific 4 mm nodular opacity in the left lower lobe. CT CAP (11/7/24) showed no significant interval change s/p cystoprostatectomy with a right lower quadrant ileal conduit creation, no definite sites of new metastatic disease. CT CAP (4/3/25) showed definite new " inflammatory wall thickening of bilateral renal collecting systems and ureters all the way to the ureteric-conduit anastomosis, no acute unexpected CT findings associated with the conduit, minority of retroperitoneal lymph nodes have increased in size when compared to most recent CT 11/7/24. Patient was seen in ED (4/3/25) for hydronephrosis. Reports continued nausea and vomiting at home. Reports mild back pain. Reports catheter is clear and is able to pass urine. Reports increased fatigue and sleepiness. Management options including risks, benefits and alternatives discussed at length and all questions answered. Patient prefers to proceed with BMP, PSA, and CT AP now, if stable, will follow-up in 1 year with CT CAP.           Scribe Attestation  By signing my name below, IKatja Scribe   attest that this documentation has been prepared under the direction and in the presence of Eugene Lawson MD.

## 2025-04-11 ENCOUNTER — OFFICE VISIT (OUTPATIENT)
Dept: PRIMARY CARE | Facility: CLINIC | Age: 80
End: 2025-04-11
Payer: MEDICARE

## 2025-04-11 VITALS
WEIGHT: 152.4 LBS | OXYGEN SATURATION: 99 % | TEMPERATURE: 98.3 F | BODY MASS INDEX: 24.49 KG/M2 | HEART RATE: 62 BPM | HEIGHT: 66 IN | DIASTOLIC BLOOD PRESSURE: 62 MMHG | SYSTOLIC BLOOD PRESSURE: 120 MMHG

## 2025-04-11 DIAGNOSIS — K21.9 GASTROESOPHAGEAL REFLUX DISEASE WITHOUT ESOPHAGITIS: ICD-10-CM

## 2025-04-11 DIAGNOSIS — J40 BRONCHITIS: Primary | ICD-10-CM

## 2025-04-11 DIAGNOSIS — I10 ESSENTIAL HYPERTENSION: ICD-10-CM

## 2025-04-11 DIAGNOSIS — I48.11 LONGSTANDING PERSISTENT ATRIAL FIBRILLATION (MULTI): ICD-10-CM

## 2025-04-11 PROCEDURE — 1159F MED LIST DOCD IN RCRD: CPT | Performed by: NURSE PRACTITIONER

## 2025-04-11 PROCEDURE — 99495 TRANSJ CARE MGMT MOD F2F 14D: CPT | Performed by: NURSE PRACTITIONER

## 2025-04-11 PROCEDURE — 1036F TOBACCO NON-USER: CPT | Performed by: NURSE PRACTITIONER

## 2025-04-11 PROCEDURE — 1125F AMNT PAIN NOTED PAIN PRSNT: CPT | Performed by: NURSE PRACTITIONER

## 2025-04-11 PROCEDURE — 3074F SYST BP LT 130 MM HG: CPT | Performed by: NURSE PRACTITIONER

## 2025-04-11 PROCEDURE — 1157F ADVNC CARE PLAN IN RCRD: CPT | Performed by: NURSE PRACTITIONER

## 2025-04-11 PROCEDURE — 3078F DIAST BP <80 MM HG: CPT | Performed by: NURSE PRACTITIONER

## 2025-04-11 RX ORDER — AMOXICILLIN AND CLAVULANATE POTASSIUM 875; 125 MG/1; MG/1
875 TABLET, FILM COATED ORAL 2 TIMES DAILY
Qty: 20 TABLET | Refills: 0 | Status: SHIPPED | OUTPATIENT
Start: 2025-04-11 | End: 2025-04-21

## 2025-04-11 ASSESSMENT — ENCOUNTER SYMPTOMS
ALLERGIC/IMMUNOLOGIC NEGATIVE: 1
NEUROLOGICAL NEGATIVE: 1
FREQUENCY: 0
DIFFICULTY URINATING: 0
NERVOUS/ANXIOUS: 0
SHORTNESS OF BREATH: 1
HEADACHES: 0
CHEST TIGHTNESS: 0
APPETITE CHANGE: 0
TREMORS: 0
EYE DISCHARGE: 0
DYSURIA: 0
DIARRHEA: 0
HEMATURIA: 0
AGITATION: 0
CARDIOVASCULAR NEGATIVE: 1
DIZZINESS: 0
SORE THROAT: 0
CONSTIPATION: 0
EYES NEGATIVE: 1
ENDOCRINE NEGATIVE: 1
ADENOPATHY: 0
ARTHRALGIAS: 0
GASTROINTESTINAL NEGATIVE: 1
BLOOD IN STOOL: 0
PALPITATIONS: 0
LIGHT-HEADEDNESS: 0
JOINT SWELLING: 0
WHEEZING: 1
COUGH: 1
ACTIVITY CHANGE: 0
ABDOMINAL PAIN: 0
EYE REDNESS: 0
BRUISES/BLEEDS EASILY: 0

## 2025-04-11 ASSESSMENT — PATIENT HEALTH QUESTIONNAIRE - PHQ9
2. FEELING DOWN, DEPRESSED OR HOPELESS: NOT AT ALL
1. LITTLE INTEREST OR PLEASURE IN DOING THINGS: NOT AT ALL
SUM OF ALL RESPONSES TO PHQ9 QUESTIONS 1 AND 2: 0

## 2025-04-11 ASSESSMENT — PAIN SCALES - GENERAL: PAINLEVEL_OUTOF10: 5

## 2025-04-11 ASSESSMENT — LIFESTYLE VARIABLES: HOW OFTEN DO YOU HAVE A DRINK CONTAINING ALCOHOL: NEVER

## 2025-04-11 NOTE — PATIENT INSTRUCTIONS
Start augmentin , 2 times daily.  For 10 days.  Take until finished even if you feel better before then.   Call for worsening or not improving symptoms  Follow up with Dentist  Follow up with Urology as scheduled  Check blood pressure 2-3 times a week.  Call for blood pressures greater than 140/90.  Bring list of blood pressures to next visit.

## 2025-04-11 NOTE — PROGRESS NOTES
"   Patient: Devin Fung Jr. \"Brandon\"  : 1945  PCP: Tasha Alvarez, FAWAD-CNP  MRN: [id number]  Program: Transitional Care Management  Status: Enrolled  Effective Dates: 2025 - present  Responsible Staff: Belkis Calderón  Social Drivers to be Addressed: Physical Activity, Social Connections, Stress, Tobacco Use         Devin Fung Jr. \"Brandon\" is a 80 y.o. male presenting today for follow-up after being discharged from the hospital 8 days ago. The main problem requiring admission was Bilateral hydronephrosis without obstruction s/p indwelling Patton catheter  Possible Urinary tract infection  MIMI on CKD4  History of bladder cancer s/p resection & ilial conduit  Cholelithiasis. The discharge summary and/or Transitional Care Management documentation was reviewed. Medication reconciliation was performed as indicated via the \"Edvin as Reviewed\" timestamp.     Devin Fung Jr. \"Brandon\" was contacted by Transitional Care Management services two days after his discharge. This encounter and supporting documentation was reviewed.  Patient presents to office today status post hospitalization.  He reports he was told that there was a twist or kink in his ileal conduit which cause a backup of urine and hydronephrosis.  Patient was discharged with the urinary catheter in his ileal conduit which has subsequently been removed.  He denies any further concerns.  He states he feels much better regarding his back pain and illness.  He has completed his antibiotics that he was given for this.  However he has developed a cough and congestion and wheezing that has not improved and in fact is worsening.  Denies elevated temperature.  He does report good urinary output.  He admits to some mild shortness of breath as well.  * This note was partially generated using the Dragon Voice recognition system. There may be some incorrect wording, spelling and/or spelling errors or punctuation errors that were not corrected prior to " "committing the note to the medical record.*    Review of Systems   Constitutional:  Negative for activity change and appetite change.   HENT:  Negative for congestion, ear pain, hearing loss and sore throat.    Eyes: Negative.  Negative for discharge, redness and visual disturbance.   Respiratory:  Positive for cough, shortness of breath and wheezing. Negative for chest tightness.    Cardiovascular: Negative.  Negative for chest pain, palpitations and leg swelling.   Gastrointestinal: Negative.  Negative for abdominal pain, blood in stool, constipation and diarrhea.   Endocrine: Negative.    Genitourinary: Negative.  Negative for difficulty urinating, dysuria, frequency, hematuria, penile discharge and testicular pain.   Musculoskeletal:  Negative for arthralgias and joint swelling.   Skin:  Negative for rash.   Allergic/Immunologic: Negative.    Neurological: Negative.  Negative for dizziness, tremors, light-headedness and headaches.   Hematological:  Negative for adenopathy. Does not bruise/bleed easily.   Psychiatric/Behavioral:  Negative for agitation. The patient is not nervous/anxious.        /62   Pulse 62   Temp 36.8 °C (98.3 °F)   Ht 1.676 m (5' 6\")   Wt 69.1 kg (152 lb 6.4 oz)   SpO2 99%   BMI 24.60 kg/m²     Physical Exam  Constitutional:       General: He is not in acute distress.     Appearance: Normal appearance.   HENT:      Head: Normocephalic.   Eyes:      Conjunctiva/sclera: Conjunctivae normal.   Neck:      Vascular: Normal carotid pulses. No carotid bruit.   Cardiovascular:      Rate and Rhythm: Normal rate and regular rhythm.      Heart sounds: Normal heart sounds.   Pulmonary:      Effort: Pulmonary effort is normal.      Breath sounds: Examination of the left-upper field reveals wheezing. Examination of the right-middle field reveals wheezing. Wheezing present.   Abdominal:      General: Bowel sounds are normal. There is no distension.      Palpations: Abdomen is soft.      " Tenderness: There is no abdominal tenderness. There is no guarding.   Musculoskeletal:         General: Normal range of motion.      Cervical back: No crepitus. No pain with movement.   Skin:     General: Skin is warm and dry.      Capillary Refill: Capillary refill takes less than 2 seconds.      Comments: Dry crusted areas noted to scalp.  Flaky white patches identified as well.   Neurological:      Mental Status: He is alert and oriented to person, place, and time.   Psychiatric:         Mood and Affect: Mood normal.         Speech: Speech normal.         The complexity of medical decision making for this patient's transitional care is moderate.    Assessment/Plan   Problem List Items Addressed This Visit             ICD-10-CM    Atrial fibrillation (Multi) I48.91    Essential hypertension I10    Gastroesophageal reflux disease without esophagitis K21.9     Other Visit Diagnoses         Codes    Bronchitis    -  Primary J40    Relevant Medications    amoxicillin-pot clavulanate (Augmentin) 875-125 mg tablet

## 2025-04-14 ENCOUNTER — PATIENT OUTREACH (OUTPATIENT)
Dept: PRIMARY CARE | Facility: CLINIC | Age: 80
End: 2025-04-14
Payer: MEDICARE

## 2025-04-14 NOTE — PROGRESS NOTES
Unable to reach patient for a F/U call , PCP visit complete 4/11/2025     [facility] with call back number for patient to call if needed   If no voicemail available call attempts x 2 were made to contact the patient to assist with any questions or concerns patient may have.     L/M encouraged patient to call providers for any questions concerns or change in condition

## 2025-04-17 ENCOUNTER — HOSPITAL ENCOUNTER (OUTPATIENT)
Dept: RADIOLOGY | Facility: HOSPITAL | Age: 80
Discharge: HOME | End: 2025-04-17
Payer: MEDICARE

## 2025-04-17 DIAGNOSIS — C67.9 MALIGNANT NEOPLASM OF URINARY BLADDER, UNSPECIFIED SITE (MULTI): ICD-10-CM

## 2025-04-17 LAB
ANION GAP SERPL CALCULATED.4IONS-SCNC: 15 MMOL/L (CALC) (ref 7–17)
BUN SERPL-MCNC: 72 MG/DL (ref 7–25)
BUN/CREAT SERPL: 20 (CALC) (ref 6–22)
CALCIUM SERPL-MCNC: 9.3 MG/DL (ref 8.6–10.3)
CHLORIDE SERPL-SCNC: 100 MMOL/L (ref 98–110)
CO2 SERPL-SCNC: 23 MMOL/L (ref 20–32)
CREAT SERPL-MCNC: 3.59 MG/DL (ref 0.7–1.28)
EGFRCR SERPLBLD CKD-EPI 2021: 17 ML/MIN/1.73M2
GLUCOSE SERPL-MCNC: 86 MG/DL (ref 65–99)
POTASSIUM SERPL-SCNC: 4.6 MMOL/L (ref 3.5–5.3)
PSA SERPL-MCNC: <0.04 NG/ML
SODIUM SERPL-SCNC: 138 MMOL/L (ref 135–146)

## 2025-04-17 PROCEDURE — 2550000001 HC RX 255 CONTRASTS: Performed by: STUDENT IN AN ORGANIZED HEALTH CARE EDUCATION/TRAINING PROGRAM

## 2025-04-17 PROCEDURE — 74176 CT ABD & PELVIS W/O CONTRAST: CPT | Performed by: STUDENT IN AN ORGANIZED HEALTH CARE EDUCATION/TRAINING PROGRAM

## 2025-04-17 PROCEDURE — 74176 CT ABD & PELVIS W/O CONTRAST: CPT

## 2025-04-17 PROCEDURE — A9698 NON-RAD CONTRAST MATERIALNOC: HCPCS | Performed by: STUDENT IN AN ORGANIZED HEALTH CARE EDUCATION/TRAINING PROGRAM

## 2025-04-17 RX ADMIN — IOHEXOL 500 ML: 12 SOLUTION ORAL at 08:30

## 2025-05-27 ENCOUNTER — TELEPHONE (OUTPATIENT)
Dept: UROLOGY | Facility: CLINIC | Age: 80
End: 2025-05-27

## 2025-05-27 DIAGNOSIS — C67.9 MALIGNANT NEOPLASM OF URINARY BLADDER, UNSPECIFIED SITE (MULTI): Primary | ICD-10-CM

## 2025-05-27 RX ORDER — AMOXICILLIN AND CLAVULANATE POTASSIUM 875; 125 MG/1; MG/1
1 TABLET, FILM COATED ORAL 2 TIMES DAILY
Qty: 10 TABLET | Refills: 0 | Status: SHIPPED | OUTPATIENT
Start: 2025-05-27 | End: 2025-06-01

## 2025-06-17 ENCOUNTER — PATIENT OUTREACH (OUTPATIENT)
Dept: PRIMARY CARE | Facility: CLINIC | Age: 80
End: 2025-06-17
Payer: MEDICARE

## 2025-06-17 NOTE — PROGRESS NOTES
Unable to reach patient for follow up call      Left voicemail with call back number for patient to call if needed   If no voicemail available call attempts x 2 were made to contact the patient to assist with any questions or concerns patient may have.     L/M Encouraged to call providers for any questions concerns or change in condition

## 2025-06-22 ENCOUNTER — APPOINTMENT (OUTPATIENT)
Dept: CARDIOLOGY | Facility: HOSPITAL | Age: 80
End: 2025-06-22
Payer: MEDICARE

## 2025-06-22 ENCOUNTER — APPOINTMENT (OUTPATIENT)
Dept: RADIOLOGY | Facility: HOSPITAL | Age: 80
End: 2025-06-22
Payer: MEDICARE

## 2025-06-22 ENCOUNTER — HOSPITAL ENCOUNTER (EMERGENCY)
Facility: HOSPITAL | Age: 80
Discharge: HOME | End: 2025-06-22
Payer: MEDICARE

## 2025-06-22 VITALS
BODY MASS INDEX: 21.97 KG/M2 | OXYGEN SATURATION: 98 % | WEIGHT: 140 LBS | TEMPERATURE: 98.1 F | HEART RATE: 56 BPM | DIASTOLIC BLOOD PRESSURE: 63 MMHG | HEIGHT: 67 IN | SYSTOLIC BLOOD PRESSURE: 127 MMHG | RESPIRATION RATE: 18 BRPM

## 2025-06-22 DIAGNOSIS — R11.2 NAUSEA AND VOMITING, UNSPECIFIED VOMITING TYPE: ICD-10-CM

## 2025-06-22 DIAGNOSIS — N30.90 CYSTITIS: Primary | ICD-10-CM

## 2025-06-22 LAB
ALBUMIN SERPL BCP-MCNC: 4.1 G/DL (ref 3.4–5)
ALP SERPL-CCNC: 42 U/L (ref 33–136)
ALT SERPL W P-5'-P-CCNC: 11 U/L (ref 10–52)
ANION GAP SERPL CALC-SCNC: 18 MMOL/L (ref 10–20)
APPEARANCE UR: ABNORMAL
AST SERPL W P-5'-P-CCNC: 14 U/L (ref 9–39)
BACTERIA #/AREA URNS AUTO: ABNORMAL /HPF
BASOPHILS # BLD AUTO: 0.01 X10*3/UL (ref 0–0.1)
BASOPHILS NFR BLD AUTO: 0.1 %
BILIRUB SERPL-MCNC: 0.7 MG/DL (ref 0–1.2)
BILIRUB UR STRIP.AUTO-MCNC: NEGATIVE MG/DL
BUN SERPL-MCNC: 69 MG/DL (ref 6–23)
CALCIUM SERPL-MCNC: 8.6 MG/DL (ref 8.6–10.3)
CARDIAC TROPONIN I PNL SERPL HS: 18 NG/L (ref 0–20)
CARDIAC TROPONIN I PNL SERPL HS: 20 NG/L (ref 0–20)
CHLORIDE SERPL-SCNC: 99 MMOL/L (ref 98–107)
CO2 SERPL-SCNC: 19 MMOL/L (ref 21–32)
COLOR UR: ABNORMAL
CREAT SERPL-MCNC: 4.29 MG/DL (ref 0.5–1.3)
EGFRCR SERPLBLD CKD-EPI 2021: 13 ML/MIN/1.73M*2
EOSINOPHIL # BLD AUTO: 0 X10*3/UL (ref 0–0.4)
EOSINOPHIL NFR BLD AUTO: 0 %
ERYTHROCYTE [DISTWIDTH] IN BLOOD BY AUTOMATED COUNT: 17.2 % (ref 11.5–14.5)
GLUCOSE SERPL-MCNC: 167 MG/DL (ref 74–99)
GLUCOSE UR STRIP.AUTO-MCNC: NORMAL MG/DL
HCT VFR BLD AUTO: 31.4 % (ref 41–52)
HGB BLD-MCNC: 10.4 G/DL (ref 13.5–17.5)
IMM GRANULOCYTES # BLD AUTO: 0.06 X10*3/UL (ref 0–0.5)
IMM GRANULOCYTES NFR BLD AUTO: 0.5 % (ref 0–0.9)
KETONES UR STRIP.AUTO-MCNC: NEGATIVE MG/DL
LACTATE SERPL-SCNC: 1.8 MMOL/L (ref 0.4–2)
LEUKOCYTE ESTERASE UR QL STRIP.AUTO: ABNORMAL
LYMPHOCYTES # BLD AUTO: 0.62 X10*3/UL (ref 0.8–3)
LYMPHOCYTES NFR BLD AUTO: 5.5 %
MAGNESIUM SERPL-MCNC: 2.25 MG/DL (ref 1.6–2.4)
MCH RBC QN AUTO: 27.6 PG (ref 26–34)
MCHC RBC AUTO-ENTMCNC: 33.1 G/DL (ref 32–36)
MCV RBC AUTO: 83 FL (ref 80–100)
MONOCYTES # BLD AUTO: 0.63 X10*3/UL (ref 0.05–0.8)
MONOCYTES NFR BLD AUTO: 5.6 %
MUCOUS THREADS #/AREA URNS AUTO: ABNORMAL /LPF
NEUTROPHILS # BLD AUTO: 9.9 X10*3/UL (ref 1.6–5.5)
NEUTROPHILS NFR BLD AUTO: 88.3 %
NITRITE UR QL STRIP.AUTO: NEGATIVE
NRBC BLD-RTO: 0 /100 WBCS (ref 0–0)
PH UR STRIP.AUTO: 7 [PH]
PLATELET # BLD AUTO: 204 X10*3/UL (ref 150–450)
POTASSIUM SERPL-SCNC: 3.2 MMOL/L (ref 3.5–5.3)
PROT SERPL-MCNC: 7.7 G/DL (ref 6.4–8.2)
PROT UR STRIP.AUTO-MCNC: ABNORMAL MG/DL
RBC # BLD AUTO: 3.77 X10*6/UL (ref 4.5–5.9)
RBC # UR STRIP.AUTO: ABNORMAL MG/DL
RBC #/AREA URNS AUTO: ABNORMAL /HPF
SODIUM SERPL-SCNC: 133 MMOL/L (ref 136–145)
SP GR UR STRIP.AUTO: 1.01
UROBILINOGEN UR STRIP.AUTO-MCNC: NORMAL MG/DL
WBC # BLD AUTO: 11.2 X10*3/UL (ref 4.4–11.3)
WBC #/AREA URNS AUTO: >50 /HPF

## 2025-06-22 PROCEDURE — 74176 CT ABD & PELVIS W/O CONTRAST: CPT

## 2025-06-22 PROCEDURE — 84484 ASSAY OF TROPONIN QUANT: CPT | Performed by: PHYSICIAN ASSISTANT

## 2025-06-22 PROCEDURE — 87086 URINE CULTURE/COLONY COUNT: CPT | Mod: GEALAB | Performed by: PHYSICIAN ASSISTANT

## 2025-06-22 PROCEDURE — 36415 COLL VENOUS BLD VENIPUNCTURE: CPT | Performed by: PHYSICIAN ASSISTANT

## 2025-06-22 PROCEDURE — 83605 ASSAY OF LACTIC ACID: CPT | Performed by: PHYSICIAN ASSISTANT

## 2025-06-22 PROCEDURE — 2500000001 HC RX 250 WO HCPCS SELF ADMINISTERED DRUGS (ALT 637 FOR MEDICARE OP): Performed by: PHYSICIAN ASSISTANT

## 2025-06-22 PROCEDURE — 81001 URINALYSIS AUTO W/SCOPE: CPT | Performed by: PHYSICIAN ASSISTANT

## 2025-06-22 PROCEDURE — 2500000004 HC RX 250 GENERAL PHARMACY W/ HCPCS (ALT 636 FOR OP/ED): Performed by: PHYSICIAN ASSISTANT

## 2025-06-22 PROCEDURE — 99285 EMERGENCY DEPT VISIT HI MDM: CPT | Mod: 25

## 2025-06-22 PROCEDURE — 2500000002 HC RX 250 W HCPCS SELF ADMINISTERED DRUGS (ALT 637 FOR MEDICARE OP, ALT 636 FOR OP/ED): Performed by: PHYSICIAN ASSISTANT

## 2025-06-22 PROCEDURE — 80053 COMPREHEN METABOLIC PANEL: CPT | Performed by: PHYSICIAN ASSISTANT

## 2025-06-22 PROCEDURE — 93005 ELECTROCARDIOGRAM TRACING: CPT

## 2025-06-22 PROCEDURE — 96361 HYDRATE IV INFUSION ADD-ON: CPT | Performed by: PHYSICIAN ASSISTANT

## 2025-06-22 PROCEDURE — 96365 THER/PROPH/DIAG IV INF INIT: CPT | Performed by: PHYSICIAN ASSISTANT

## 2025-06-22 PROCEDURE — 74176 CT ABD & PELVIS W/O CONTRAST: CPT | Performed by: RADIOLOGY

## 2025-06-22 PROCEDURE — 96375 TX/PRO/DX INJ NEW DRUG ADDON: CPT | Performed by: PHYSICIAN ASSISTANT

## 2025-06-22 PROCEDURE — 83735 ASSAY OF MAGNESIUM: CPT | Performed by: PHYSICIAN ASSISTANT

## 2025-06-22 PROCEDURE — 85025 COMPLETE CBC W/AUTO DIFF WBC: CPT | Performed by: PHYSICIAN ASSISTANT

## 2025-06-22 RX ORDER — CEPHALEXIN 500 MG/1
500 CAPSULE ORAL 3 TIMES DAILY
Qty: 30 CAPSULE | Refills: 0 | Status: SHIPPED | OUTPATIENT
Start: 2025-06-22 | End: 2025-06-26 | Stop reason: ALTCHOICE

## 2025-06-22 RX ORDER — POTASSIUM CHLORIDE 20 MEQ/1
20 TABLET, EXTENDED RELEASE ORAL ONCE
Status: COMPLETED | OUTPATIENT
Start: 2025-06-22 | End: 2025-06-22

## 2025-06-22 RX ORDER — ACETAMINOPHEN 325 MG/1
975 TABLET ORAL ONCE
Status: COMPLETED | OUTPATIENT
Start: 2025-06-22 | End: 2025-06-22

## 2025-06-22 RX ORDER — ONDANSETRON 4 MG/1
4 TABLET, ORALLY DISINTEGRATING ORAL EVERY 8 HOURS PRN
Qty: 9 TABLET | Refills: 0 | Status: SHIPPED | OUTPATIENT
Start: 2025-06-22 | End: 2025-06-25

## 2025-06-22 RX ORDER — MORPHINE SULFATE 4 MG/ML
4 INJECTION INTRAVENOUS ONCE
Status: DISCONTINUED | OUTPATIENT
Start: 2025-06-22 | End: 2025-06-22

## 2025-06-22 RX ORDER — CEFTRIAXONE 1 G/50ML
1 INJECTION, SOLUTION INTRAVENOUS ONCE
Status: COMPLETED | OUTPATIENT
Start: 2025-06-22 | End: 2025-06-22

## 2025-06-22 RX ORDER — ONDANSETRON HYDROCHLORIDE 2 MG/ML
4 INJECTION, SOLUTION INTRAVENOUS ONCE
Status: COMPLETED | OUTPATIENT
Start: 2025-06-22 | End: 2025-06-22

## 2025-06-22 RX ADMIN — POTASSIUM CHLORIDE 20 MEQ: 1500 TABLET, EXTENDED RELEASE ORAL at 13:49

## 2025-06-22 RX ADMIN — ACETAMINOPHEN 975 MG: 325 TABLET, FILM COATED ORAL at 13:20

## 2025-06-22 RX ADMIN — ONDANSETRON 4 MG: 2 INJECTION, SOLUTION INTRAMUSCULAR; INTRAVENOUS at 13:01

## 2025-06-22 RX ADMIN — SODIUM CHLORIDE 1000 ML: 0.9 INJECTION, SOLUTION INTRAVENOUS at 13:03

## 2025-06-22 RX ADMIN — CEFTRIAXONE 1 G: 1 INJECTION, SOLUTION INTRAVENOUS at 13:52

## 2025-06-22 ASSESSMENT — PAIN SCALES - GENERAL
PAINLEVEL_OUTOF10: 4
PAINLEVEL_OUTOF10: 0 - NO PAIN

## 2025-06-22 ASSESSMENT — LIFESTYLE VARIABLES
EVER FELT BAD OR GUILTY ABOUT YOUR DRINKING: NO
TOTAL SCORE: 0
HAVE PEOPLE ANNOYED YOU BY CRITICIZING YOUR DRINKING: NO
EVER HAD A DRINK FIRST THING IN THE MORNING TO STEADY YOUR NERVES TO GET RID OF A HANGOVER: NO
HAVE YOU EVER FELT YOU SHOULD CUT DOWN ON YOUR DRINKING: NO

## 2025-06-22 ASSESSMENT — PAIN DESCRIPTION - LOCATION: LOCATION: BACK

## 2025-06-22 ASSESSMENT — PAIN - FUNCTIONAL ASSESSMENT
PAIN_FUNCTIONAL_ASSESSMENT: 0-10
PAIN_FUNCTIONAL_ASSESSMENT: 0-10

## 2025-06-22 ASSESSMENT — PAIN DESCRIPTION - ORIENTATION: ORIENTATION: LOWER

## 2025-06-22 ASSESSMENT — PAIN DESCRIPTION - PAIN TYPE: TYPE: CHRONIC PAIN

## 2025-06-22 NOTE — ED PROVIDER NOTES
HPI   Chief Complaint   Patient presents with    Weakness, Gen    Vomiting       Is a 80-year-old male coming in for multiple symptoms x 1 week.  Over the last 3 days he has worsened.  Patient complains of generalized fatigue, decreased appetite, nausea and vomiting.  He gets frequent urinary tract infections after having his bladder removed due to cancer multiple years ago and has a urostomy bag.  Patient sees Dr. Lawson and when he starts getting symptoms he takes an antibiotic to treat UTI.  He has been taking the antibiotic but is not helping.  He feels like he is worsening.  He complains of back pain but states that it is chronic and is slightly worse than usual.  He is still having urinary output into his urostomy bag.  He denies any fevers.      History provided by:  Patient          Patient History   Medical History[1]  Surgical History[2]  Family History[3]  Social History[4]    Physical Exam   ED Triage Vitals [06/22/25 1235]   Temperature Heart Rate Respirations BP   36.7 °C (98.1 °F) 81 17 160/54      Pulse Ox Temp Source Heart Rate Source Patient Position   98 % Temporal Monitor --      BP Location FiO2 (%)     -- --       Physical Exam  Vitals and nursing note reviewed.   Constitutional:       General: He is not in acute distress.     Appearance: Normal appearance. He is not ill-appearing or toxic-appearing.   HENT:      Head: Normocephalic and atraumatic.      Nose: Nose normal.      Mouth/Throat:      Mouth: Mucous membranes are moist.      Pharynx: Oropharynx is clear.   Eyes:      Extraocular Movements: Extraocular movements intact.      Conjunctiva/sclera: Conjunctivae normal.      Pupils: Pupils are equal, round, and reactive to light.   Cardiovascular:      Rate and Rhythm: Normal rate and regular rhythm.      Pulses: Normal pulses.      Heart sounds: Normal heart sounds.   Pulmonary:      Effort: Pulmonary effort is normal. No respiratory distress.      Breath sounds: Normal breath sounds.    Abdominal:      General: Abdomen is flat. Bowel sounds are normal.      Palpations: Abdomen is soft.      Tenderness: There is no abdominal tenderness. There is no right CVA tenderness or left CVA tenderness.      Comments: Urostomy bag present to the right side the abdomen without any concerning physical exam findings and urine present in the bag.   Musculoskeletal:         General: Normal range of motion.      Cervical back: Normal range of motion and neck supple.   Skin:     General: Skin is warm and dry.      Coloration: Skin is not jaundiced or pale.      Findings: No bruising.   Neurological:      General: No focal deficit present.      Mental Status: He is alert and oriented to person, place, and time. Mental status is at baseline.   Psychiatric:         Mood and Affect: Mood normal.         Behavior: Behavior normal.           ED Course & MDM   Diagnoses as of 06/22/25 1647   Cystitis   Nausea and vomiting, unspecified vomiting type                 No data recorded     Dion Coma Scale Score: 15 (06/22/25 1313 : Renetta oWod RN)                           Medical Decision Making  Summary:  Medical Decision Making:   Patient presented as described in HPI. Patient case including ROS, PE, and treatment and plan discussed with ED attending if attached as cosigner. Results from labs and or imaging included below if completed. Devin Fung .  is a 80 y.o. coming in for Patient presents with:  Weakness, Gen  Vomiting  .  Patient's vitals are stable.  He states he gets this way when he has a UTI.  Patient denies any fevers.  Lab work was completed on the patient.  White blood cell count was 11.2.  Urine does show large UTI.  Previous cultures were evaluated and show susceptibility to cephalosporins.  Patient will be given IV Rocephin here.  His kidney function appears to be at baseline compared to prior.  He was given slight IV fluids of 1 L.  Potassium was slightly low and because he was given IV  fluids was given a low-dose of potassium.  CT was completed and shows similar findings compared to prior.  Patient will be discharged on Keflex and advised follow-up closely with Dr. Lawson and patient and wife are agreeable for treatment plan and states that they will return immediately if he does develop any concerning symptoms such as fever or worsening symptoms.  Will be discharged on Zofran and Keflex.      Disposition is completed with shared decision making with the patient or guardian present with the patient. They were advised to follow up with PCP or recommended provider in 2-3 days for another evaluation and exam. I advised the patient to return or go to closest emergency room immediately if symptoms change, get worse, or new symptoms develop prior to follow up. I explained the plan and treatment course. Patient/guardian is in agreement with plan, treatment course, and follow up and state that they will comply.    Labs Reviewed  CBC WITH AUTO DIFFERENTIAL - Abnormal     WBC                           11.2                   nRBC                          0.0                    RBC                           3.77 (*)               Hemoglobin                    10.4 (*)               Hematocrit                    31.4 (*)               MCV                           83                     MCH                           27.6                   MCHC                          33.1                   RDW                           17.2 (*)               Platelets                     204                    Neutrophils %                 88.3                   Immature Granulocytes %, Automated   0.5                    Lymphocytes %                 5.5                    Monocytes %                   5.6                    Eosinophils %                 0.0                    Basophils %                   0.1                    Neutrophils Absolute          9.90 (*)               Immature Granulocytes Absolute, Au*   0.06                    Lymphocytes Absolute          0.62 (*)               Monocytes Absolute            0.63                   Eosinophils Absolute          0.00                   Basophils Absolute            0.01                COMPREHENSIVE METABOLIC PANEL - Abnormal     Glucose                       167 (*)                Sodium                        133 (*)                Potassium                     3.2 (*)                Chloride                      99                     Bicarbonate                   19 (*)                 Anion Gap                     18                     Urea Nitrogen                 69 (*)                 Creatinine                    4.29 (*)               eGFR                          13 (*)                 Calcium                       8.6                    Albumin                       4.1                    Alkaline Phosphatase          42                     Total Protein                 7.7                    AST                           14                     Bilirubin, Total              0.7                    ALT                           11                  URINALYSIS WITH REFLEX CULTURE AND MICROSCOPIC - Abnormal     Color, Urine                                         Appearance, Urine             Turbid (*)               Specific Gravity, Urine       1.011                  pH, Urine                     7.0                    Protein, Urine                100 (2+) (*)               Glucose, Urine                Normal                 Blood, Urine                  1.0 (3+) (*)               Ketones, Urine                NEGATIVE                Bilirubin, Urine              NEGATIVE                Urobilinogen, Urine           Normal                 Nitrite, Urine                NEGATIVE                Leukocyte Esterase, Urine     500 [place]/uL (*)            MICROSCOPIC ONLY, URINE - Abnormal     WBC, Urine                    >50 (*)                RBC, Urine                     11-20 (*)               Bacteria, Urine               1+ (*)                 Mucus, Urine                  FEW                 MAGNESIUM - Normal     Magnesium                     2.25                LACTATE - Normal     Lactate                       1.8                      Narrative: Venipuncture immediately after or during the administration of Metamizole may lead to falsely low results. Testing should be performed immediately prior to Metamizole dosing.  SERIAL TROPONIN-INITIAL - Normal     Troponin I, High Sensitivity   20                       Narrative: Less than 99th percentile of normal range cutoff-                Female and children under 18 years old <14 ng/L; Male <21 ng/L: Negative                Repeat testing should be performed if clinically indicated.                                 Female and children under 18 years old 14-50 ng/L; Male 21-50 ng/L:                Consistent with possible cardiac damage and possible increased clinical                 risk. Serial measurements may help to assess extent of myocardial damage.                                 >50 ng/L: Consistent with cardiac damage, increased clinical risk and                myocardial infarction. Serial measurements may help assess extent of                 myocardial damage.                                  NOTE: Children less than 1 year old may have higher baseline troponin                 levels and results should be interpreted in conjunction with the overall                 clinical context.                                 NOTE: Troponin I testing is performed using a different                 testing methodology at [facility] than at other                 [facility]. Direct result comparisons should only                 be made within the same method.  SERIAL TROPONIN, 1 HOUR - Normal     Troponin I, High Sensitivity   18                       Narrative: Less than 99th percentile of normal range cutoff-                 Female and children under 18 years old <14 ng/L; Male <21 ng/L: Negative                Repeat testing should be performed if clinically indicated.                                 Female and children under 18 years old 14-50 ng/L; Male 21-50 ng/L:                Consistent with possible cardiac damage and possible increased clinical                 risk. Serial measurements may help to assess extent of myocardial damage.                                 >50 ng/L: Consistent with cardiac damage, increased clinical risk and                myocardial infarction. Serial measurements may help assess extent of                 myocardial damage.                                  NOTE: Children less than 1 year old may have higher baseline troponin                 levels and results should be interpreted in conjunction with the overall                 clinical context.                                 NOTE: Troponin I testing is performed using a different                 testing methodology at [facility] than at other                 [facility]. Direct result comparisons should only                 be made within the same method.  URINE CULTURE  TROPONIN SERIES- (INITIAL, 1 HR)       Narrative: The following orders were created for panel order Troponin I Series, High Sensitivity (0, 1 HR).                Procedure                               Abnormality         Status                                   ---------                               -----------         ------                                   Troponin I, High Sensiti...[[id number]]  Normal              Final result                             Troponin, High Sensitivi...[[id number]]  Normal              Final result                                             Please view results for these tests on the individual orders.  URINALYSIS WITH REFLEX CULTURE AND MICROSCOPIC       Narrative: The following orders were created for panel order  Urinalysis with Reflex Culture and Microscopic.                Procedure                               Abnormality         Status                                   ---------                               -----------         ------                                   Urinalysis with Reflex C...[[id number]]  Abnormal            Final result                             Extra Urine Gray Tube[[id number]]                            In process                                               Please view results for these tests on the individual orders.  EXTRA URINE GRAY TUBE   CT abdomen pelvis wo IV contrast   Final Result    1.  There is some unchanged mild to moderate hydronephrosis and    hydroureter, without an obvious obstructing cause, the ureters are    dilated to their junction with the ileal conduit in the right    abdomen.  No urinary tract stone.    2. Status post cystectomy and prostatectomy.    3. Colonic diverticulosis. No evidence of diverticulitis.    4. Stable pancreatic cystic focus which could be side-branch IPMN may    be characterized with nonemergent MRCP.    5. No fracture or acute osseous abnormality. Multilevel lumbar    degenerative changes are noted.    6. Cholelithiasis.                Signed by: Sarah Holt 6/22/2025 3:24 PM    Dictation workstation:   [id number]                            Tests/Medications/Escalations of Care considered but not given: As in MDM    Patient care discussed with: N/A  Social Determinants affecting care: N/A    Final diagnosis and disposition as documented     Diagnoses as of 06/22/25 1647  Cystitis  Nausea and vomiting, unspecified vomiting type       Shared decision making was completed and determined that patient will be discharged. I discussed the differential; results and discharge plan with the patient and/or family/friend/caregiver if present.  I emphasized the importance of follow-up with the physician I referred them to in the timeframe recommended.  I explained  reasons for the patient to return to the Emergency Department. They agreed that if they feel their condition is worsening or if they have any other concern they should call 911 immediately for further assistance. I gave the patient an opportunity to ask all questions they had and answered all of them accordingly. They understand return precautions and discharge instructions. The patient and/or family/friend/caregiver expressed understanding verbally and that they would comply.     Disposition: Discharge      This note has been transcribed using voice recognition and may contain grammatical errors, misplaced words, incorrect words, incorrect phrases or other errors.         Procedure  Procedures       [1]   Past Medical History:  Diagnosis Date    Alcohol dependence in remission (Multi) 10/25/2023    Bilateral carotid artery stenosis 10/25/2023    Infection and inflammatory reaction due to internal right knee prosthesis, initial encounter 06/03/2020    Infection of total right knee replacement, initial encounter    Old myocardial infarction     History of myocardial infarction    Opioid dependence in remission (Multi) 10/25/2023    Other obesity due to excess calories 06/03/2020    Class 1 obesity due to excess calories with serious comorbidity and body mass index (BMI) of 30.0 to 30.9 in adult   [2]   Past Surgical History:  Procedure Laterality Date    OTHER SURGICAL HISTORY  04/29/2019    Appendectomy    OTHER SURGICAL HISTORY  04/29/2019    Tonsillectomy    OTHER SURGICAL HISTORY  04/29/2019    Knee replacement    OTHER SURGICAL HISTORY  04/29/2019    Inguinal hernia repair    OTHER SURGICAL HISTORY  04/29/2019    Cardiac catheterization with stent placement    OTHER SURGICAL HISTORY  04/29/2019    Corneal lasik    OTHER SURGICAL HISTORY  09/24/2020    Cystoscopy   [3]   Family History  Problem Relation Name Age of Onset    Stroke Mother      Throat cancer Father      Colon cancer Sister 53     Colon cancer  Brother 51    [4]   Social History  Tobacco Use    Smoking status: Former     Current packs/day: 0.00     Average packs/day: 2.0 packs/day for 44.0 years (88.0 ttl pk-yrs)     Types: Cigarettes     Start date:      Quit date: 2005     Years since quittin.4    Smokeless tobacco: Never   Vaping Use    Vaping status: Never Used   Substance Use Topics    Alcohol use: Never    Drug use: Yes     Types: Marijuana     Comment: medical MJ kar Rome PA-C  [phone]

## 2025-06-23 LAB — HOLD SPECIMEN: NORMAL

## 2025-06-24 LAB
ATRIAL RATE: 72 BPM
P AXIS: 68 DEGREES
P OFFSET: 180 MS
P ONSET: 137 MS
PR INTERVAL: 148 MS
Q ONSET: 211 MS
QRS COUNT: 12 BEATS
QRS DURATION: 94 MS
QT INTERVAL: 394 MS
QTC CALCULATION(BAZETT): 431 MS
QTC FREDERICIA: 418 MS
R AXIS: -27 DEGREES
T AXIS: 3 DEGREES
T OFFSET: 408 MS
VENTRICULAR RATE: 72 BPM

## 2025-06-25 LAB
BACTERIA UR CULT: ABNORMAL
BACTERIA UR CULT: ABNORMAL

## 2025-06-26 ENCOUNTER — TELEPHONE (OUTPATIENT)
Dept: PHARMACY | Facility: HOSPITAL | Age: 80
End: 2025-06-26
Payer: MEDICARE

## 2025-06-26 DIAGNOSIS — N30.00 ACUTE CYSTITIS WITHOUT HEMATURIA: Primary | ICD-10-CM

## 2025-06-26 RX ORDER — AMOXICILLIN AND CLAVULANATE POTASSIUM 500; 125 MG/1; MG/1
500 TABLET, FILM COATED ORAL 2 TIMES DAILY
Qty: 20 TABLET | Refills: 0 | Status: SHIPPED | OUTPATIENT
Start: 2025-06-26 | End: 2025-07-06

## 2025-06-26 NOTE — PROGRESS NOTES
EDPD Note: Bug-Drug Mismatch    Contacted Mr./Mrs./Ms. Devin Martininato Montalvo. regarding a positive urine culture/result that was taken during their recent emergency room visit. I completed education with patient. The patient is not being treated appropriately with Keflex 500mg TID x 10D.     Patient presented to the ED for a urinary tract infection. Patient reports that he has frequent urinary fract infections after having his bladder removed from cancer and is currently utilizing a  urostomy bag. He was placed on an antibiotic outpatient but reports this was not helping.     He reported generalized weakness & 2 episode of vomiting. He reports chronic back pain that is slightly worse than it is normally.     Due to systemic symptoms, recommend switching to Augmentin 500/125 mg BID x 10D for complicated UTI coverage (CrCl calculated: 13 mL/min). Advised patient to stop Keflex therapy.     The following prescription was sent to the patient's preferred pharmacy. No further follow up needed from EDPD Team.   Drug: Augmentin 500/125mg   Sig: BID  Days Supply: 10D    Susceptibility data from last 90 days.  Collected Specimen Info Organism Amoxicillin/Clavulanate Ampicillin Ampicillin/Sulbactam Cefazolin Cefazolin (uncomplicated UTIs only) Ciprofloxacin Gentamicin Levofloxacin Nitrofurantoin Piperacillin/Tazobactam   06/22/25 Urine from Ileal Conduit Escherichia coli   S   S  S  S  S  S  S  S     Klebsiella pneumoniae/variicola  S  R  S  S  S  S  S  S  I  S   04/03/25 Urine from Ileal Conduit Klebsiella pneumoniae/variicola  S  R  S  S  S  S  S   I  S     Collected Specimen Info Organism Trimethoprim/Sulfamethoxazole   06/22/25 Urine from Ileal Conduit Escherichia coli  S     Klebsiella pneumoniae/variicola  S   04/03/25 Urine from Ileal Conduit Klebsiella pneumoniae/variicola  S       Emily Dobbins, PharmD

## 2025-07-02 ENCOUNTER — APPOINTMENT (OUTPATIENT)
Dept: UROLOGY | Facility: CLINIC | Age: 80
End: 2025-07-02
Payer: MEDICARE

## 2025-07-02 DIAGNOSIS — N13.30 HYDRONEPHROSIS, UNSPECIFIED HYDRONEPHROSIS TYPE: ICD-10-CM

## 2025-07-02 DIAGNOSIS — C67.9 MALIGNANT NEOPLASM OF URINARY BLADDER, UNSPECIFIED SITE (MULTI): Primary | ICD-10-CM

## 2025-07-02 DIAGNOSIS — R11.2 NAUSEA AND VOMITING, UNSPECIFIED VOMITING TYPE: ICD-10-CM

## 2025-07-02 PROCEDURE — 1125F AMNT PAIN NOTED PAIN PRSNT: CPT | Performed by: NURSE PRACTITIONER

## 2025-07-02 PROCEDURE — 1160F RVW MEDS BY RX/DR IN RCRD: CPT | Performed by: NURSE PRACTITIONER

## 2025-07-02 PROCEDURE — 99214 OFFICE O/P EST MOD 30 MIN: CPT | Performed by: NURSE PRACTITIONER

## 2025-07-02 PROCEDURE — 1159F MED LIST DOCD IN RCRD: CPT | Performed by: NURSE PRACTITIONER

## 2025-07-02 PROCEDURE — G2211 COMPLEX E/M VISIT ADD ON: HCPCS | Performed by: NURSE PRACTITIONER

## 2025-07-02 PROCEDURE — 1036F TOBACCO NON-USER: CPT | Performed by: NURSE PRACTITIONER

## 2025-07-02 RX ORDER — ONDANSETRON 4 MG/1
4 TABLET, ORALLY DISINTEGRATING ORAL EVERY 8 HOURS PRN
Qty: 30 TABLET | Refills: 0 | Status: SHIPPED | OUTPATIENT
Start: 2025-07-02 | End: 2025-07-12

## 2025-07-02 ASSESSMENT — PAIN SCALES - GENERAL: PAINLEVEL_OUTOF10: 6

## 2025-07-02 NOTE — PROGRESS NOTES
[facility]  Outpatient Clinic Note    Subjective   Brandon Fung Jr. is a 80 y.o. male    History of Present Illness   Patient presenting to clinic today s/p ED visit 6/22/2025 found to have UTI   Discharged on Keflex and Zofran. History of Bladder cancer, urostomy, follows Dr. Lawson. Per Dr. Lawson last communication 6/26/2025 feels that conduit may be twisted and   may need to place Patton catheter into stoma for drainage. Patient states that he is doing well. Draining clear, yellow urine into drainage bag. He declines Patton catheter insertion. He denies fevers, chills, n/v, or gross hematuria.     Past Medical History and Surgical History   Medical History[1]  Surgical History[2]    Medications  Medications Ordered Prior to Encounter[3]    Objective   Physicial Exam  General: Well developed, well nourished, alert and cooperative, appears in no acute distress  Eyes: Non-injected conjunctiva, sclera clear, no proptosis  Cardiac: Extremities are warm and well perfused. No edema, cyanosis or pallor.   Lungs: Breathing is easy, non-labored. Speaking in clear and complete sentences. Normal diaphragmatic movement.  MSK: Ambulatory with steady gait, unassisted  Neuro: alert and oriented to person, place and time  Psych: Demonstrates good judgement and reason, without hallucinations, abnormal affect or abnormal behaviors.  Skin: no obvious lesions, no rashes.    Admission on 06/22/2025, Discharged on 06/22/2025   Component Date Value Ref Range Status    Ventricular Rate 06/22/2025 72  BPM Preliminary    Atrial Rate 06/22/2025 72  BPM Preliminary    OH Interval 06/22/2025 148  ms Preliminary    QRS Duration 06/22/2025 94  ms Preliminary    QT Interval 06/22/2025 394  ms Preliminary    QTC Calculation(Bazett) 06/22/2025 431  ms Preliminary    P [place] 06/22/2025 68  degrees Preliminary    R Axis 06/22/2025 -27  degrees Preliminary    T [place] 06/22/2025 3  degrees Preliminary    QRS Count 06/22/2025 12  beats Preliminary     Q Onset 06/22/2025 211  ms Preliminary    P Onset 06/22/2025 137  ms Preliminary    P Offset 06/22/2025 180  ms Preliminary    T Offset 06/22/2025 408  ms Preliminary    QTC Fred[place]ia 06/22/2025 418  ms Preliminary    WBC 06/22/2025 11.2  4.4 - 11.3 x10*3/uL Final    nRBC 06/22/2025 0.0  0.0 - 0.0 /100 WBCs Final    RBC 06/22/2025 3.77 (L)  4.50 - 5.90 x10*6/uL Final    Hemoglobin 06/22/2025 10.4 (L)  13.5 - 17.5 g/dL Final    Hematocrit 06/22/2025 31.4 (L)  41.0 - 52.0 % Final    MCV 06/22/2025 83  80 - 100 fL Final    MCH 06/22/2025 27.6  26.0 - 34.0 pg Final    MCHC 06/22/2025 33.1  32.0 - 36.0 g/dL Final    RDW 06/22/2025 17.2 (H)  11.5 - 14.5 % Final    Platelets 06/22/2025 204  150 - 450 x10*3/uL Final    Neutrophils % 06/22/2025 88.3  40.0 - 80.0 % Final    Immature Granulocytes %, Automated 06/22/2025 0.5  0.0 - 0.9 % Final    Immature Granulocyte Count (IG) includes promyelocytes, myelocytes and metamyelocytes but does not include bands. Percent differential counts (%) should be interpreted in the context of the absolute cell counts (cells/UL).    Lymphocytes % 06/22/2025 5.5  13.0 - 44.0 % Final    Monocytes % 06/22/2025 5.6  2.0 - 10.0 % Final    Eosinophils % 06/22/2025 0.0  0.0 - 6.0 % Final    Basophils % 06/22/2025 0.1  0.0 - 2.0 % Final    Neutrophils Absolute 06/22/2025 9.90 (H)  1.60 - 5.50 x10*3/uL Final    Percent differential counts (%) should be interpreted in the context of the absolute cell counts (cells/uL).    Immature Granulocytes Absolute, Au* 06/22/2025 0.06  0.00 - 0.50 x10*3/uL Final    Lymphocytes Absolute 06/22/2025 0.62 (L)  0.80 - 3.00 x10*3/uL Final    Monocytes Absolute 06/22/2025 0.63  0.05 - 0.80 x10*3/uL Final    Eosinophils Absolute 06/22/2025 0.00  0.00 - 0.40 x10*3/uL Final    Basophils Absolute 06/22/2025 0.01  0.00 - 0.10 x10*3/uL Final    Magnesium 06/22/2025 2.25  1.60 - 2.40 mg/dL Final    Glucose 06/22/2025 167 (H)  74 - 99 mg/dL Final    Sodium 06/22/2025 133 (L)   136 - 145 mmol/L Final    Potassium 06/22/2025 3.2 (L)  3.5 - 5.3 mmol/L Final    Chloride 06/22/2025 99  98 - 107 mmol/L Final    Bicarbonate 06/22/2025 19 (L)  21 - 32 mmol/L Final    Anion Gap 06/22/2025 18  10 - 20 mmol/L Final    Urea Nitrogen 06/22/2025 69 (H)  6 - 23 mg/dL Final    Creatinine 06/22/2025 4.29 (H)  0.50 - 1.30 mg/dL Final    eGFR 06/22/2025 13 (L)  >60 mL/min/1.73m*2 Final    Calculations of estimated GFR are performed using the 2021 CKD-EPI Study Refit equation without the race variable for the IDMS-Traceable creatinine methods.  https://jasn.asnjournals.org/content/early/2021/09/22/ASN.7278346607    Calcium 06/22/2025 8.6  8.6 - 10.3 mg/dL Final    Albumin 06/22/2025 4.1  3.4 - 5.0 g/dL Final    Alkaline Phosphatase 06/22/2025 42  33 - 136 U/L Final    Total Protein 06/22/2025 7.7  6.4 - 8.2 g/dL Final    AST 06/22/2025 14  9 - 39 U/L Final    Bilirubin, Total 06/22/2025 0.7  0.0 - 1.2 mg/dL Final    ALT 06/22/2025 11  10 - 52 U/L Final    Patients treated with Sulfasalazine may generate falsely decreased results for ALT.    Lactate 06/22/2025 1.8  0.4 - 2.0 mmol/L Final    Color, Urine 06/22/2025 Light-Yellow  Light-Yellow, Yellow, Dark-Yellow Final    Appearance, Urine 06/22/2025 Turbid (N)  Clear Final    Specific Gravity, Urine 06/22/2025 1.011  1.005 - 1.035 Final    pH, Urine 06/22/2025 7.0  5.0, 5.5, 6.0, 6.5, 7.0, 7.5, 8.0 Final    Protein, Urine 06/22/2025 100 (2+) (A)  NEGATIVE, 10 (TRACE), 20 (TRACE) mg/dL Final    Glucose, Urine 06/22/2025 Normal  Normal mg/dL Final    Blood, Urine 06/22/2025 1.0 (3+) (A)  NEGATIVE mg/dL Final    Ketones, Urine 06/22/2025 NEGATIVE  NEGATIVE mg/dL Final    Bilirubin, Urine 06/22/2025 NEGATIVE  NEGATIVE mg/dL Final    Urobilinogen, Urine 06/22/2025 Normal  Normal mg/dL Final    Nitrite, Urine 06/22/2025 NEGATIVE  NEGATIVE Final    Leukocyte Esterase, Urine 06/22/2025 500 [place]/uL (A)  NEGATIVE Final    Troponin I, High Sensitivity 06/22/2025 20  0 -  20 ng/L Final    Troponin I, High Sensitivity 06/22/2025 18  0 - 20 ng/L Final    WBC, Urine 06/22/2025 >50 (A)  1-5, NONE /HPF Final    RBC, Urine 06/22/2025 11-20 (A)  NONE, 1-2, 3-5 /HPF Final    Bacteria, Urine 06/22/2025 1+ (A)  NONE SEEN /HPF Final    Mucus, Urine 06/22/2025 FEW  Reference range not established. /LPF Final    Urine Culture 06/22/2025 >=100,000 CFU/mL Escherichia coli (A)   Final    Urine Culture 06/22/2025 20,000 - 80,000 CFU/mL Klebsiella pneumoniae/variicola (A)   Final      Review of Systems  All other systems have been reviewed and are negative for complaint.      Assessment and Plan     6/22/2025 CT A & P wo Contrast   KIDNEYS AND URETERS:  Bilateral mild-to-moderate hydronephrosis and hydroureter appears  unchanged. Ureters have been diverted through a right lower quadrant  ileal loop urostomy. No calculi are evident. Unchanged vascular  calcification in the left kidney. Bilateral renal hypodensities are  unchanged, most likely cysts, some are too small to characterize.  IMPRESSION:  1.  There is some unchanged mild to moderate hydronephrosis and  hydroureter, without an obvious obstructing cause, the ureters are  dilated to their junction with the ileal conduit in the right  abdomen.  No urinary tract stone.  2. Status post cystectomy and prostatectomy.  3. Colonic diverticulosis. No evidence of diverticulitis.  4. Stable pancreatic cystic focus which could be side-branch IPMN may  be characterized with nonemergent MRCP.  5. No fracture or acute osseous abnormality. Multilevel lumbar  degenerative changes are noted.  6. Cholelithiasis.      PELVIS:      BLADDER:  The bladder has been resected.      REPRODUCTIVE ORGANS:  Prostatectomy.    - Consistent Mild to Moderate Hydronephrosis   - Patient will follow up in 4 to 6 weeks with labs and imaging prior   He will continue antibiotic as prescribed     All questions and concerns were addressed. Patient verbalizes understanding and has no  other questions at this time.     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, initial encounter 06/03/2020    Infection of total right knee replacement, initial encounter    Old myocardial infarction     History of myocardial infarction    Opioid dependence in remission (Multi) 10/25/2023    Other obesity due to excess calories 06/03/2020    Class 1 obesity due to excess calories with serious comorbidity and body mass index (BMI) of 30.0 to 30.9 in adult   [2]   Past Surgical History:  Procedure Laterality Date    OTHER SURGICAL HISTORY  04/29/2019    Appendectomy    OTHER SURGICAL HISTORY  04/29/2019    Tonsillectomy    OTHER SURGICAL HISTORY  04/29/2019    Knee replacement    OTHER SURGICAL HISTORY  04/29/2019    Inguinal hernia repair    OTHER SURGICAL HISTORY  04/29/2019    Cardiac catheterization with stent placement    OTHER SURGICAL HISTORY  04/29/2019    Corneal lasik    OTHER SURGICAL HISTORY  09/24/2020    Cystoscopy   [3]   Current Outpatient Medications on File Prior to Visit   Medication Sig Dispense Refill    acetaminophen (Tylenol) 500 mg tablet Take by mouth.      amLODIPine (Norvasc) 10 mg tablet Take 1 tablet (10 mg) by mouth once daily. as directed 90 tablet 1    amoxicillin-clavulanate (Augmentin) 500-125 mg tablet Take 1 tablet (500 mg of amoxicillin) by mouth 2 times a day for 10 days. 20 tablet 0    aspirin 81 mg EC tablet Take 1 tablet (81 mg) by mouth once daily.      atorvastatin (Lipitor) 20 mg tablet Take 1 tablet (20 mg) by mouth once daily. as directed 90 tablet 1    chlorthalidone (Hygroton) 25 mg tablet Take 1 tablet (25 mg) by mouth once daily. 90 tablet 1    docusate sodium (Colace) 50 mg capsule Take 1 capsule (50 mg) by mouth 2 times a day.  180 capsule 1    Jantoven 5 mg tablet Take 1 tablet (5 mg) by mouth once daily at bedtime. 90 tablet 1    levothyroxine (Synthroid, Levoxyl) 75 mcg tablet Take 1 tablet (75 mcg) by mouth once daily. 90 tablet 1    lisinopril 40 mg tablet Take 1 tablet (40 mg) by mouth once daily. 90 tablet 1    medical cannabis Take 1 each by mouth 2 times a day.      multivitamin tablet Take 1 tablet by mouth once daily. He takes Spirellna vitamin daily      [] ondansetron ODT (Zofran-ODT) 4 mg disintegrating tablet Dissolve 1 tablet (4 mg) in the mouth every 8 hours if needed for nausea or vomiting for up to 3 days. 9 tablet 0    pyridoxine (Vitamin B-6) 100 mg tablet Take 1 tablet (100 mg) by mouth once daily.      sodium bicarbonate 650 mg tablet Take 2 tablets (1,300 mg) by mouth 2 times a day. 360 tablet 3    tiotropium (Spiriva with HandiHaler) 18 mcg inhalation capsule Place 1 capsule (18 mcg) into inhaler and inhale once daily. 90 capsule 1    [DISCONTINUED] cephalexin (Keflex) 500 mg capsule Take 1 capsule (500 mg) by mouth 3 times a day for 10 days. 30 capsule 0     No current facility-administered medications on file prior to visit.

## 2025-07-09 ENCOUNTER — APPOINTMENT (OUTPATIENT)
Dept: RADIOLOGY | Facility: HOSPITAL | Age: 80
End: 2025-07-09
Payer: MEDICARE

## 2025-07-10 ENCOUNTER — HOSPITAL ENCOUNTER (OUTPATIENT)
Dept: RADIOLOGY | Facility: HOSPITAL | Age: 80
Discharge: HOME | End: 2025-07-10
Payer: MEDICARE

## 2025-07-10 DIAGNOSIS — N13.30 HYDRONEPHROSIS, UNSPECIFIED HYDRONEPHROSIS TYPE: ICD-10-CM

## 2025-07-10 PROCEDURE — 76770 US EXAM ABDO BACK WALL COMP: CPT | Performed by: RADIOLOGY

## 2025-07-10 PROCEDURE — 76770 US EXAM ABDO BACK WALL COMP: CPT

## 2025-07-11 LAB
ANION GAP SERPL CALCULATED.4IONS-SCNC: 13 MMOL/L (CALC) (ref 7–17)
BUN SERPL-MCNC: 75 MG/DL (ref 7–25)
BUN/CREAT SERPL: 18 (CALC) (ref 6–22)
CALCIUM SERPL-MCNC: 9 MG/DL (ref 8.6–10.3)
CHLORIDE SERPL-SCNC: 103 MMOL/L (ref 98–110)
CO2 SERPL-SCNC: 25 MMOL/L (ref 20–32)
CREAT SERPL-MCNC: 4.09 MG/DL (ref 0.7–1.28)
EGFRCR SERPLBLD CKD-EPI 2021: 14 ML/MIN/1.73M2
GLUCOSE SERPL-MCNC: 81 MG/DL (ref 65–139)
POTASSIUM SERPL-SCNC: 4.7 MMOL/L (ref 3.5–5.3)
SODIUM SERPL-SCNC: 141 MMOL/L (ref 135–146)

## 2025-08-06 DIAGNOSIS — E78.5 HYPERLIPIDEMIA, UNSPECIFIED HYPERLIPIDEMIA TYPE: ICD-10-CM

## 2025-08-06 DIAGNOSIS — Z00.00 HEALTHCARE MAINTENANCE: ICD-10-CM

## 2025-08-06 RX ORDER — APIXABAN 5 MG/1
TABLET, FILM COATED ORAL
COMMUNITY
Start: 2025-07-16

## 2025-08-06 RX ORDER — LISINOPRIL 40 MG/1
40 TABLET ORAL DAILY
Qty: 90 TABLET | Refills: 1 | Status: SHIPPED | OUTPATIENT
Start: 2025-08-06

## 2025-08-10 ENCOUNTER — HOSPITAL ENCOUNTER (EMERGENCY)
Facility: HOSPITAL | Age: 80
Discharge: HOME | End: 2025-08-10
Payer: MEDICARE

## 2025-08-10 VITALS
SYSTOLIC BLOOD PRESSURE: 152 MMHG | WEIGHT: 140 LBS | OXYGEN SATURATION: 97 % | HEART RATE: 88 BPM | HEIGHT: 67 IN | TEMPERATURE: 98.2 F | DIASTOLIC BLOOD PRESSURE: 74 MMHG | RESPIRATION RATE: 19 BRPM | BODY MASS INDEX: 21.97 KG/M2

## 2025-08-10 DIAGNOSIS — N30.90 CYSTITIS: Primary | ICD-10-CM

## 2025-08-10 LAB
ALBUMIN SERPL BCP-MCNC: 4.7 G/DL (ref 3.4–5)
ALP SERPL-CCNC: 43 U/L (ref 33–136)
ALT SERPL W P-5'-P-CCNC: 14 U/L (ref 10–52)
ANION GAP SERPL CALC-SCNC: 18 MMOL/L (ref 10–20)
APPEARANCE UR: ABNORMAL
AST SERPL W P-5'-P-CCNC: 38 U/L (ref 9–39)
BACTERIA #/AREA URNS AUTO: ABNORMAL /HPF
BASOPHILS # BLD AUTO: 0.02 X10*3/UL (ref 0–0.1)
BASOPHILS NFR BLD AUTO: 0.4 %
BILIRUB SERPL-MCNC: 0.5 MG/DL (ref 0–1.2)
BILIRUB UR STRIP.AUTO-MCNC: NEGATIVE MG/DL
BUN SERPL-MCNC: 64 MG/DL (ref 6–23)
CALCIUM SERPL-MCNC: 9.5 MG/DL (ref 8.6–10.3)
CHLORIDE SERPL-SCNC: 99 MMOL/L (ref 98–107)
CO2 SERPL-SCNC: 23 MMOL/L (ref 21–32)
COLOR UR: ABNORMAL
CREAT SERPL-MCNC: 4.11 MG/DL (ref 0.5–1.3)
EGFRCR SERPLBLD CKD-EPI 2021: 14 ML/MIN/1.73M*2
EOSINOPHIL # BLD AUTO: 0.09 X10*3/UL (ref 0–0.4)
EOSINOPHIL NFR BLD AUTO: 1.8 %
ERYTHROCYTE [DISTWIDTH] IN BLOOD BY AUTOMATED COUNT: 18 % (ref 11.5–14.5)
GLUCOSE SERPL-MCNC: 73 MG/DL (ref 74–99)
GLUCOSE UR STRIP.AUTO-MCNC: NORMAL MG/DL
HCT VFR BLD AUTO: 35.7 % (ref 41–52)
HGB BLD-MCNC: 11.5 G/DL (ref 13.5–17.5)
IMM GRANULOCYTES # BLD AUTO: 0.01 X10*3/UL (ref 0–0.5)
IMM GRANULOCYTES NFR BLD AUTO: 0.2 % (ref 0–0.9)
KETONES UR STRIP.AUTO-MCNC: NEGATIVE MG/DL
LACTATE SERPL-SCNC: 1.1 MMOL/L (ref 0.4–2)
LEUKOCYTE ESTERASE UR QL STRIP.AUTO: ABNORMAL
LYMPHOCYTES # BLD AUTO: 1 X10*3/UL (ref 0.8–3)
LYMPHOCYTES NFR BLD AUTO: 20.2 %
MCH RBC QN AUTO: 27.4 PG (ref 26–34)
MCHC RBC AUTO-ENTMCNC: 32.2 G/DL (ref 32–36)
MCV RBC AUTO: 85 FL (ref 80–100)
MONOCYTES # BLD AUTO: 0.43 X10*3/UL (ref 0.05–0.8)
MONOCYTES NFR BLD AUTO: 8.7 %
NEUTROPHILS # BLD AUTO: 3.41 X10*3/UL (ref 1.6–5.5)
NEUTROPHILS NFR BLD AUTO: 68.7 %
NITRITE UR QL STRIP.AUTO: NEGATIVE
NRBC BLD-RTO: 0 /100 WBCS (ref 0–0)
PH UR STRIP.AUTO: 7 [PH]
PLATELET # BLD AUTO: 264 X10*3/UL (ref 150–450)
POTASSIUM SERPL-SCNC: 4.3 MMOL/L (ref 3.5–5.3)
PROT SERPL-MCNC: 8.7 G/DL (ref 6.4–8.2)
PROT UR STRIP.AUTO-MCNC: ABNORMAL MG/DL
RBC # BLD AUTO: 4.2 X10*6/UL (ref 4.5–5.9)
RBC # UR STRIP.AUTO: ABNORMAL MG/DL
RBC #/AREA URNS AUTO: >20 /HPF
SODIUM SERPL-SCNC: 136 MMOL/L (ref 136–145)
SP GR UR STRIP.AUTO: 1.01
UROBILINOGEN UR STRIP.AUTO-MCNC: NORMAL MG/DL
WBC # BLD AUTO: 5 X10*3/UL (ref 4.4–11.3)
WBC #/AREA URNS AUTO: >50 /HPF

## 2025-08-10 PROCEDURE — 96376 TX/PRO/DX INJ SAME DRUG ADON: CPT

## 2025-08-10 PROCEDURE — 36415 COLL VENOUS BLD VENIPUNCTURE: CPT | Performed by: PHYSICIAN ASSISTANT

## 2025-08-10 PROCEDURE — 81001 URINALYSIS AUTO W/SCOPE: CPT | Performed by: EMERGENCY MEDICINE

## 2025-08-10 PROCEDURE — 87077 CULTURE AEROBIC IDENTIFY: CPT | Mod: GEALAB | Performed by: EMERGENCY MEDICINE

## 2025-08-10 PROCEDURE — 96365 THER/PROPH/DIAG IV INF INIT: CPT

## 2025-08-10 PROCEDURE — 96361 HYDRATE IV INFUSION ADD-ON: CPT

## 2025-08-10 PROCEDURE — 96375 TX/PRO/DX INJ NEW DRUG ADDON: CPT

## 2025-08-10 PROCEDURE — 99284 EMERGENCY DEPT VISIT MOD MDM: CPT | Mod: 25

## 2025-08-10 PROCEDURE — 85025 COMPLETE CBC W/AUTO DIFF WBC: CPT | Performed by: PHYSICIAN ASSISTANT

## 2025-08-10 PROCEDURE — 80053 COMPREHEN METABOLIC PANEL: CPT | Performed by: PHYSICIAN ASSISTANT

## 2025-08-10 PROCEDURE — 83605 ASSAY OF LACTIC ACID: CPT | Performed by: PHYSICIAN ASSISTANT

## 2025-08-10 PROCEDURE — 2500000004 HC RX 250 GENERAL PHARMACY W/ HCPCS (ALT 636 FOR OP/ED)

## 2025-08-10 PROCEDURE — 2500000004 HC RX 250 GENERAL PHARMACY W/ HCPCS (ALT 636 FOR OP/ED): Performed by: PHYSICIAN ASSISTANT

## 2025-08-10 RX ORDER — ONDANSETRON HYDROCHLORIDE 2 MG/ML
4 INJECTION, SOLUTION INTRAVENOUS ONCE
Status: COMPLETED | OUTPATIENT
Start: 2025-08-10 | End: 2025-08-10

## 2025-08-10 RX ORDER — CEFTRIAXONE 1 G/50ML
1 INJECTION, SOLUTION INTRAVENOUS ONCE
Status: COMPLETED | OUTPATIENT
Start: 2025-08-10 | End: 2025-08-10

## 2025-08-10 RX ORDER — MORPHINE SULFATE 4 MG/ML
4 INJECTION INTRAVENOUS ONCE
Status: COMPLETED | OUTPATIENT
Start: 2025-08-10 | End: 2025-08-10

## 2025-08-10 RX ORDER — ONDANSETRON HYDROCHLORIDE 2 MG/ML
INJECTION, SOLUTION INTRAVENOUS
Status: COMPLETED
Start: 2025-08-10 | End: 2025-08-10

## 2025-08-10 RX ORDER — CEPHALEXIN 500 MG/1
500 CAPSULE ORAL 3 TIMES DAILY
Qty: 21 CAPSULE | Refills: 0 | Status: SHIPPED | OUTPATIENT
Start: 2025-08-10 | End: 2025-08-17

## 2025-08-10 RX ADMIN — ONDANSETRON 4 MG: 2 INJECTION, SOLUTION INTRAMUSCULAR; INTRAVENOUS at 16:37

## 2025-08-10 RX ADMIN — ONDANSETRON 4 MG: 2 INJECTION, SOLUTION INTRAMUSCULAR; INTRAVENOUS at 18:02

## 2025-08-10 RX ADMIN — SODIUM CHLORIDE 1000 ML: 9 INJECTION, SOLUTION INTRAVENOUS at 16:57

## 2025-08-10 RX ADMIN — ONDANSETRON HYDROCHLORIDE 4 MG: 2 INJECTION, SOLUTION INTRAVENOUS at 18:02

## 2025-08-10 RX ADMIN — MORPHINE SULFATE 4 MG: 4 INJECTION INTRAVENOUS at 16:36

## 2025-08-10 RX ADMIN — CEFTRIAXONE 1 G: 1 INJECTION, SOLUTION INTRAVENOUS at 16:37

## 2025-08-10 ASSESSMENT — PAIN DESCRIPTION - LOCATION: LOCATION: BACK

## 2025-08-10 ASSESSMENT — LIFESTYLE VARIABLES
EVER HAD A DRINK FIRST THING IN THE MORNING TO STEADY YOUR NERVES TO GET RID OF A HANGOVER: NO
TOTAL SCORE: 0
HAVE YOU EVER FELT YOU SHOULD CUT DOWN ON YOUR DRINKING: NO
HAVE PEOPLE ANNOYED YOU BY CRITICIZING YOUR DRINKING: NO
EVER FELT BAD OR GUILTY ABOUT YOUR DRINKING: NO

## 2025-08-10 ASSESSMENT — PAIN DESCRIPTION - DESCRIPTORS: DESCRIPTORS: ACHING

## 2025-08-10 ASSESSMENT — PAIN - FUNCTIONAL ASSESSMENT: PAIN_FUNCTIONAL_ASSESSMENT: 0-10

## 2025-08-10 ASSESSMENT — PAIN DESCRIPTION - PAIN TYPE: TYPE: ACUTE PAIN

## 2025-08-10 ASSESSMENT — PAIN DESCRIPTION - ORIENTATION: ORIENTATION: LOWER

## 2025-08-10 ASSESSMENT — PAIN SCALES - GENERAL: PAINLEVEL_OUTOF10: 5 - MODERATE PAIN

## 2025-08-11 LAB — HOLD SPECIMEN: NORMAL

## 2025-08-13 ENCOUNTER — APPOINTMENT (OUTPATIENT)
Dept: UROLOGY | Facility: CLINIC | Age: 80
End: 2025-08-13
Payer: MEDICARE

## 2025-08-13 LAB — BACTERIA UR CULT: ABNORMAL

## 2025-08-14 ENCOUNTER — RESULTS FOLLOW-UP (OUTPATIENT)
Dept: PHARMACY | Facility: HOSPITAL | Age: 80
End: 2025-08-14
Payer: MEDICARE

## 2025-09-05 ENCOUNTER — APPOINTMENT (OUTPATIENT)
Dept: PRIMARY CARE | Facility: CLINIC | Age: 80
End: 2025-09-05
Payer: MEDICARE

## 2025-09-05 PROBLEM — G37.3 TRANSVERSE MYELITIS (MULTI): Status: RESOLVED | Noted: 2023-11-02 | Resolved: 2025-09-05

## 2025-09-05 PROBLEM — N18.5 CHRONIC KIDNEY DISEASE, STAGE 5 (MULTI): Status: ACTIVE | Noted: 2025-09-05

## 2025-09-05 ASSESSMENT — ENCOUNTER SYMPTOMS
EYE REDNESS: 0
GASTROINTESTINAL NEGATIVE: 1
NERVOUS/ANXIOUS: 0
CONSTIPATION: 0
LOSS OF SENSATION IN FEET: 0
APPETITE CHANGE: 0
CHEST TIGHTNESS: 0
DEPRESSION: 1
ALLERGIC/IMMUNOLOGIC NEGATIVE: 1
EYES NEGATIVE: 1
NEUROLOGICAL NEGATIVE: 1
DYSURIA: 0
HEADACHES: 0
ADENOPATHY: 0
BLOOD IN STOOL: 0
DIARRHEA: 0
EYE DISCHARGE: 0
ENDOCRINE NEGATIVE: 1
CARDIOVASCULAR NEGATIVE: 1
SORE THROAT: 0
DIFFICULTY URINATING: 0
ARTHRALGIAS: 0
HEMATURIA: 0
AGITATION: 0
BRUISES/BLEEDS EASILY: 0
ABDOMINAL PAIN: 0
ACTIVITY CHANGE: 0
JOINT SWELLING: 0
FREQUENCY: 0
OCCASIONAL FEELINGS OF UNSTEADINESS: 0
LIGHT-HEADEDNESS: 0
DIZZINESS: 0
TREMORS: 0
PALPITATIONS: 0

## 2025-09-05 ASSESSMENT — LIFESTYLE VARIABLES: HOW MANY STANDARD DRINKS CONTAINING ALCOHOL DO YOU HAVE ON A TYPICAL DAY: PATIENT DOES NOT DRINK

## 2025-09-05 ASSESSMENT — PATIENT HEALTH QUESTIONNAIRE - PHQ9
1. LITTLE INTEREST OR PLEASURE IN DOING THINGS: NOT AT ALL
SUM OF ALL RESPONSES TO PHQ9 QUESTIONS 1 AND 2: 1
2. FEELING DOWN, DEPRESSED OR HOPELESS: SEVERAL DAYS

## 2025-09-05 ASSESSMENT — PAIN SCALES - GENERAL: PAINLEVEL_OUTOF10: 5

## 2025-09-17 ENCOUNTER — APPOINTMENT (OUTPATIENT)
Dept: UROLOGY | Facility: CLINIC | Age: 80
End: 2025-09-17
Payer: MEDICARE

## 2025-11-20 ENCOUNTER — APPOINTMENT (OUTPATIENT)
Dept: UROLOGY | Facility: CLINIC | Age: 80
End: 2025-11-20
Payer: MEDICARE

## 2026-03-10 ENCOUNTER — APPOINTMENT (OUTPATIENT)
Dept: PRIMARY CARE | Facility: CLINIC | Age: 81
End: 2026-03-10
Payer: MEDICARE

## 2026-03-11 ENCOUNTER — APPOINTMENT (OUTPATIENT)
Dept: PRIMARY CARE | Facility: CLINIC | Age: 81
End: 2026-03-11
Payer: MEDICARE

## 2026-04-09 ENCOUNTER — APPOINTMENT (OUTPATIENT)
Dept: UROLOGY | Facility: CLINIC | Age: 81
End: 2026-04-09
Payer: MEDICARE